# Patient Record
Sex: FEMALE | Race: WHITE | NOT HISPANIC OR LATINO | Employment: UNEMPLOYED | URBAN - METROPOLITAN AREA
[De-identification: names, ages, dates, MRNs, and addresses within clinical notes are randomized per-mention and may not be internally consistent; named-entity substitution may affect disease eponyms.]

---

## 2021-12-21 ENCOUNTER — HISTORICAL (OUTPATIENT)
Dept: RADIOLOGY | Facility: HOSPITAL | Age: 66
End: 2021-12-21

## 2022-01-07 ENCOUNTER — HISTORICAL (OUTPATIENT)
Dept: RADIOLOGY | Facility: HOSPITAL | Age: 67
End: 2022-01-07

## 2022-01-07 LAB — CREAT SERPL-MCNC: 0.94 MG/DL (ref 0.55–1.02)

## 2022-02-22 ENCOUNTER — HISTORICAL (OUTPATIENT)
Dept: RADIOLOGY | Facility: HOSPITAL | Age: 67
End: 2022-02-22

## 2022-02-22 ENCOUNTER — HISTORICAL (OUTPATIENT)
Dept: ADMINISTRATIVE | Facility: HOSPITAL | Age: 67
End: 2022-02-22

## 2022-02-22 LAB — CREAT SERPL-MCNC: 1.01 MG/DL (ref 0.55–1.02)

## 2022-02-25 ENCOUNTER — HISTORICAL (OUTPATIENT)
Dept: ADMINISTRATIVE | Facility: HOSPITAL | Age: 67
End: 2022-02-25

## 2022-02-25 LAB
ABS NEUT (OLG): 4.34 (ref 2.1–9.2)
ALBUMIN SERPL-MCNC: 3.3 G/DL (ref 3.4–4.8)
ALBUMIN/GLOB SERPL: 1 {RATIO} (ref 1.1–2)
ALP SERPL-CCNC: 86 U/L (ref 40–150)
ALT SERPL-CCNC: 12 U/L (ref 0–55)
AST SERPL-CCNC: 17 U/L (ref 5–34)
BASOPHILS # BLD AUTO: 0 10*3/UL (ref 0–0.2)
BASOPHILS NFR BLD AUTO: 1 %
BILIRUB SERPL-MCNC: 0.7 MG/DL
BILIRUBIN DIRECT+TOT PNL SERPL-MCNC: 0.3 (ref 0–0.5)
BILIRUBIN DIRECT+TOT PNL SERPL-MCNC: 0.4 (ref 0–0.8)
BUN SERPL-MCNC: 11 MG/DL (ref 9.8–20.1)
CALCIUM SERPL-MCNC: 9.5 MG/DL (ref 8.7–10.5)
CHLORIDE SERPL-SCNC: 106 MMOL/L (ref 98–107)
CO2 SERPL-SCNC: 30 MMOL/L (ref 23–31)
CREAT SERPL-MCNC: 0.91 MG/DL (ref 0.55–1.02)
EOSINOPHIL # BLD AUTO: 0.2 10*3/UL (ref 0–0.9)
EOSINOPHIL NFR BLD AUTO: 3 %
ERYTHROCYTE [DISTWIDTH] IN BLOOD BY AUTOMATED COUNT: 12.5 % (ref 11.5–14.5)
FLAG2 (OHS): 70
FLAG3 (OHS): 80
FLAGS (OHS): 70
GLOBULIN SER-MCNC: 3.3 G/DL (ref 2.4–3.5)
GLUCOSE SERPL-MCNC: 74 MG/DL (ref 82–115)
HCT VFR BLD AUTO: 47.2 % (ref 35–46)
HEMOLYSIS INTERF INDEX SERPL-ACNC: 32
HGB BLD-MCNC: 15.8 G/DL (ref 12–16)
ICTERIC INTERF INDEX SERPL-ACNC: 0
IMM GRANULOCYTES # BLD AUTO: 0.02 10*3/UL
IMM GRANULOCYTES NFR BLD AUTO: 0 %
LIPEMIC INTERF INDEX SERPL-ACNC: 5
LOW EVENT # SUSPECT FLAG (OHS): 70
LYMPHOCYTES # BLD AUTO: 1.6 10*3/UL (ref 0.6–4.6)
LYMPHOCYTES NFR BLD AUTO: 24 %
MANUAL DIFF? (OHS): NO
MCH RBC QN AUTO: 32.8 PG (ref 26–34)
MCHC RBC AUTO-ENTMCNC: 33.5 G/DL (ref 31–37)
MCV RBC AUTO: 97.9 FL (ref 80–100)
MO BLASTS SUSPECT FLAG (OHS): 40
MONOCYTES # BLD AUTO: 0.5 10*3/UL (ref 0.1–1.3)
MONOCYTES NFR BLD AUTO: 8 %
NEUTROPHILS # BLD AUTO: 4.34 10*3/UL (ref 2.1–9.2)
NEUTROPHILS NFR BLD AUTO: 65 %
NRBC BLD AUTO-RTO: 0 % (ref 0–0.2)
PLATELET # BLD AUTO: 278 10*3/UL (ref 130–400)
PMV BLD AUTO: 9.7 FL (ref 7.4–10.4)
POTASSIUM SERPL-SCNC: 5 MMOL/L (ref 3.5–5.1)
PROT SERPL-MCNC: 6.6 G/DL (ref 5.8–7.6)
RBC # BLD AUTO: 4.82 10*6/UL (ref 4–5.2)
SODIUM SERPL-SCNC: 142 MMOL/L (ref 136–145)
WBC # SPEC AUTO: 6.7 10*3/UL (ref 4.5–11)

## 2022-03-02 ENCOUNTER — HISTORICAL (OUTPATIENT)
Dept: SURGERY | Facility: HOSPITAL | Age: 67
End: 2022-03-02

## 2022-03-11 ENCOUNTER — HISTORICAL (OUTPATIENT)
Dept: RADIOLOGY | Facility: HOSPITAL | Age: 67
End: 2022-03-11

## 2022-04-07 DIAGNOSIS — Z17.1 MALIGNANT NEOPLASM OF BREAST IN FEMALE, ESTROGEN RECEPTOR NEGATIVE, UNSPECIFIED LATERALITY, UNSPECIFIED SITE OF BREAST: ICD-10-CM

## 2022-04-07 DIAGNOSIS — C50.919 MALIGNANT NEOPLASM OF BREAST IN FEMALE, ESTROGEN RECEPTOR NEGATIVE, UNSPECIFIED LATERALITY, UNSPECIFIED SITE OF BREAST: ICD-10-CM

## 2022-04-13 ENCOUNTER — HISTORICAL (OUTPATIENT)
Dept: INFUSION THERAPY | Facility: HOSPITAL | Age: 67
End: 2022-04-13
Payer: MEDICAID

## 2022-04-13 LAB
ABS NEUT (OLG): 5.36 (ref 2.1–9.2)
ALBUMIN SERPL-MCNC: 3.1 G/DL (ref 3.4–4.8)
ALBUMIN/GLOB SERPL: 1 {RATIO} (ref 1.1–2)
ALP SERPL-CCNC: 86 U/L (ref 40–150)
ALT SERPL-CCNC: 13 U/L (ref 0–55)
AST SERPL-CCNC: 19 U/L (ref 5–34)
BASOPHILS # BLD AUTO: 0 10*3/UL (ref 0–0.2)
BASOPHILS NFR BLD AUTO: 1 %
BILIRUB SERPL-MCNC: 0.6 MG/DL
BILIRUBIN DIRECT+TOT PNL SERPL-MCNC: 0.2 (ref 0–0.5)
BILIRUBIN DIRECT+TOT PNL SERPL-MCNC: 0.4 (ref 0–0.8)
BUN SERPL-MCNC: 12 MG/DL (ref 9.8–20.1)
CALCIUM SERPL-MCNC: 9.6 MG/DL (ref 8.7–10.5)
CHLORIDE SERPL-SCNC: 106 MMOL/L (ref 98–107)
CO2 SERPL-SCNC: 27 MMOL/L (ref 23–31)
CREAT SERPL-MCNC: 1.05 MG/DL (ref 0.55–1.02)
EOSINOPHIL # BLD AUTO: 0.2 10*3/UL (ref 0–0.9)
EOSINOPHIL NFR BLD AUTO: 3 %
ERYTHROCYTE [DISTWIDTH] IN BLOOD BY AUTOMATED COUNT: 12.6 % (ref 11.5–14.5)
FLAG2 (OHS): 70
FLAG3 (OHS): 80
FLAGS (OHS): 70
GLOBULIN SER-MCNC: 3 G/DL (ref 2.4–3.5)
GLUCOSE SERPL-MCNC: 133 MG/DL (ref 82–115)
HCT VFR BLD AUTO: 46.3 % (ref 35–46)
HEMOLYSIS INTERF INDEX SERPL-ACNC: 8
HGB BLD-MCNC: 15.4 G/DL (ref 12–16)
ICTERIC INTERF INDEX SERPL-ACNC: 0
IMM GRANULOCYTES # BLD AUTO: 0.02 10*3/UL
IMM GRANULOCYTES NFR BLD AUTO: 0 %
LIPEMIC INTERF INDEX SERPL-ACNC: 9
LOW EVENT # SUSPECT FLAG (OHS): 70
LYMPHOCYTES # BLD AUTO: 1.3 10*3/UL (ref 0.6–4.6)
LYMPHOCYTES NFR BLD AUTO: 18 %
MANUAL DIFF? (OHS): NO
MCH RBC QN AUTO: 32.2 PG (ref 26–34)
MCHC RBC AUTO-ENTMCNC: 33.3 G/DL (ref 31–37)
MCV RBC AUTO: 96.9 FL (ref 80–100)
MO BLASTS SUSPECT FLAG (OHS): 40
MONOCYTES # BLD AUTO: 0.5 10*3/UL (ref 0.1–1.3)
MONOCYTES NFR BLD AUTO: 6 %
NEUTROPHILS # BLD AUTO: 5.36 10*3/UL (ref 2.1–9.2)
NEUTROPHILS NFR BLD AUTO: 72 %
NRBC BLD AUTO-RTO: 0 % (ref 0–0.2)
PLATELET # BLD AUTO: 233 10*3/UL (ref 130–400)
PMV BLD AUTO: 9.4 FL (ref 7.4–10.4)
POTASSIUM SERPL-SCNC: 4.9 MMOL/L (ref 3.5–5.1)
PROT SERPL-MCNC: 6.1 G/DL (ref 5.8–7.6)
RBC # BLD AUTO: 4.78 10*6/UL (ref 4–5.2)
SODIUM SERPL-SCNC: 139 MMOL/L (ref 136–145)
WBC # SPEC AUTO: 7.4 10*3/UL (ref 4.5–11)

## 2022-04-26 ENCOUNTER — HISTORICAL (OUTPATIENT)
Dept: ADMINISTRATIVE | Facility: HOSPITAL | Age: 67
End: 2022-04-26
Payer: MEDICAID

## 2022-04-26 LAB
ABS NEUT (OLG): 0.03 (ref 2.1–9.2)
ALBUMIN SERPL-MCNC: 3.3 G/DL (ref 3.4–4.8)
ALBUMIN/GLOB SERPL: 1.1 {RATIO} (ref 1.1–2)
ALP SERPL-CCNC: 89 U/L (ref 40–150)
ALT SERPL-CCNC: 9 U/L (ref 0–55)
AST SERPL-CCNC: 10 U/L (ref 5–34)
BASOPHILS # BLD AUTO: 0 10*3/UL (ref 0–0.2)
BASOPHILS NFR BLD AUTO: 3 %
BILIRUB SERPL-MCNC: 0.4 MG/DL
BILIRUBIN DIRECT+TOT PNL SERPL-MCNC: 0.2 (ref 0–0.5)
BILIRUBIN DIRECT+TOT PNL SERPL-MCNC: 0.2 (ref 0–0.8)
BUN SERPL-MCNC: 10.4 MG/DL (ref 9.8–20.1)
CALCIUM SERPL-MCNC: 9.7 MG/DL (ref 8.7–10.5)
CHLORIDE SERPL-SCNC: 106 MMOL/L (ref 98–107)
CO2 SERPL-SCNC: 29 MMOL/L (ref 23–31)
CREAT SERPL-MCNC: 0.88 MG/DL (ref 0.55–1.02)
EOSINOPHIL # BLD AUTO: 0 10*3/UL (ref 0–0.9)
EOSINOPHIL NFR BLD AUTO: 3 %
ERYTHROCYTE [DISTWIDTH] IN BLOOD BY AUTOMATED COUNT: 11.4 % (ref 11.5–14.5)
FLAG2 (OHS): 80
FLAG3 (OHS): 80
FLAGS (OHS): 70
GLOBULIN SER-MCNC: 3.1 G/DL (ref 2.4–3.5)
GLUCOSE SERPL-MCNC: 143 MG/DL (ref 82–115)
HCT VFR BLD AUTO: 39.1 % (ref 35–46)
HEMOLYSIS INTERF INDEX SERPL-ACNC: 3
HGB BLD-MCNC: 13.1 G/DL (ref 12–16)
ICTERIC INTERF INDEX SERPL-ACNC: 0
IMM GRANULOCYTES # BLD AUTO: 0.01 10*3/UL
IMM GRANULOCYTES NFR BLD AUTO: 1 %
IMM. NE 2 SUSPECT FLAG (OHS): 160
LIPEMIC INTERF INDEX SERPL-ACNC: 1
LOW EVENT # SUSPECT FLAG (OHS): 70
LYMPHOCYTES # BLD AUTO: 0.6 10*3/UL (ref 0.6–4.6)
LYMPHOCYTES NFR BLD AUTO: 68 %
MANUAL DIFF? (OHS): NO
MCH RBC QN AUTO: 32 PG (ref 26–34)
MCHC RBC AUTO-ENTMCNC: 33.5 G/DL (ref 31–37)
MCV RBC AUTO: 95.4 FL (ref 80–100)
MO BLASTS SUSPECT FLAG (OHS): 160
MONOCYTES # BLD AUTO: 0.2 10*3/UL (ref 0.1–1.3)
MONOCYTES NFR BLD AUTO: 22 %
NEUTROPHILS # BLD AUTO: 0.03 10*3/UL (ref 2.1–9.2)
NEUTROPHILS NFR BLD AUTO: 4 %
NRBC BLD AUTO-RTO: 0 % (ref 0–0.2)
PLATELET # BLD AUTO: 187 10*3/UL (ref 130–400)
PLATELET CLUMPS SUSPECT FLAG (OHS): 10
PMV BLD AUTO: 9.3 FL (ref 7.4–10.4)
POTASSIUM SERPL-SCNC: 4.3 MMOL/L (ref 3.5–5.1)
PROT SERPL-MCNC: 6.4 G/DL (ref 5.8–7.6)
RBC # BLD AUTO: 4.1 10*6/UL (ref 4–5.2)
SODIUM SERPL-SCNC: 140 MMOL/L (ref 136–145)
WBC # SPEC AUTO: 0.9 10*3/UL (ref 4.5–11)

## 2022-04-27 ENCOUNTER — HISTORICAL (OUTPATIENT)
Dept: INFUSION THERAPY | Facility: HOSPITAL | Age: 67
End: 2022-04-27
Payer: MEDICAID

## 2022-04-28 ENCOUNTER — HISTORICAL (OUTPATIENT)
Dept: INFUSION THERAPY | Facility: HOSPITAL | Age: 67
End: 2022-04-28
Payer: MEDICAID

## 2022-04-29 ENCOUNTER — HISTORICAL (OUTPATIENT)
Dept: INFUSION THERAPY | Facility: HOSPITAL | Age: 67
End: 2022-04-29
Payer: MEDICAID

## 2022-04-29 LAB
ABS NEUT (OLG): 10 (ref 2.1–9.2)
ALBUMIN SERPL-MCNC: 3.2 G/DL (ref 3.4–4.8)
ALBUMIN/GLOB SERPL: 0.9 {RATIO} (ref 1.1–2)
ALP SERPL-CCNC: 97 U/L (ref 40–150)
ALT SERPL-CCNC: 8 U/L (ref 0–55)
AST SERPL-CCNC: 11 U/L (ref 5–34)
BILIRUB SERPL-MCNC: 0.5 MG/DL
BILIRUBIN DIRECT+TOT PNL SERPL-MCNC: 0.2 (ref 0–0.8)
BILIRUBIN DIRECT+TOT PNL SERPL-MCNC: 0.3 (ref 0–0.5)
BUN SERPL-MCNC: 9.7 MG/DL (ref 9.8–20.1)
BUN SERPL-MCNC: 9.9 MG/DL (ref 9.8–20.1)
CALCIUM SERPL-MCNC: 9.8 MG/DL (ref 8.7–10.5)
CALCIUM SERPL-MCNC: 9.8 MG/DL (ref 8.7–10.5)
CHLORIDE SERPL-SCNC: 102 MMOL/L (ref 98–107)
CHLORIDE SERPL-SCNC: 102 MMOL/L (ref 98–107)
CO2 SERPL-SCNC: 24 MMOL/L (ref 23–31)
CO2 SERPL-SCNC: 25 MMOL/L (ref 23–31)
CREAT SERPL-MCNC: 1.14 MG/DL (ref 0.55–1.02)
CREAT SERPL-MCNC: 1.16 MG/DL (ref 0.55–1.02)
CREAT/UREA NIT SERPL: 9
ERYTHROCYTE [DISTWIDTH] IN BLOOD BY AUTOMATED COUNT: 12.1 % (ref 11.5–14.5)
FLAG2 (OHS): 70
FLAG3 (OHS): 90
FLAGS (OHS): 70
GLOBULIN SER-MCNC: 3.6 G/DL (ref 2.4–3.5)
GLUCOSE SERPL-MCNC: 204 MG/DL (ref 82–115)
GLUCOSE SERPL-MCNC: 207 MG/DL (ref 82–115)
HCT VFR BLD AUTO: 42.3 % (ref 35–46)
HEMOLYSIS INTERF INDEX SERPL-ACNC: 4
HEMOLYSIS INTERF INDEX SERPL-ACNC: 9
HGB BLD-MCNC: 14.4 G/DL (ref 12–16)
ICTERIC INTERF INDEX SERPL-ACNC: 0
ICTERIC INTERF INDEX SERPL-ACNC: 0
IMM. NE 1 SUSPECT FLAG (OHS): 300
LIPEMIC INTERF INDEX SERPL-ACNC: 1
LIPEMIC INTERF INDEX SERPL-ACNC: 3
LOW EVENT # SUSPECT FLAG (OHS): 70
MANUAL DIFF? (OHS): YES
MCH RBC QN AUTO: 32.3 PG (ref 26–34)
MCHC RBC AUTO-ENTMCNC: 34 G/DL (ref 31–37)
MCV RBC AUTO: 94.8 FL (ref 80–100)
NRBCS SUSPECT FLAG (OHS): 0
PLATELET # BLD AUTO: 235 10*3/UL (ref 130–400)
PLATELET CLUMPS SUSPECT FLAG (OHS): 40
PMV BLD AUTO: 9.3 FL (ref 7.4–10.4)
POTASSIUM SERPL-SCNC: 3.9 MMOL/L (ref 3.5–5.1)
POTASSIUM SERPL-SCNC: 4 MMOL/L (ref 3.5–5.1)
PROT SERPL-MCNC: 6.8 G/DL (ref 5.8–7.6)
RBC # BLD AUTO: 4.46 10*6/UL (ref 4–5.2)
SODIUM SERPL-SCNC: 137 MMOL/L (ref 136–145)
SODIUM SERPL-SCNC: 137 MMOL/L (ref 136–145)
WBC # SPEC AUTO: 12.6 10*3/UL (ref 4.5–11)

## 2022-05-03 DIAGNOSIS — E66.9 OBESITY, UNSPECIFIED CLASSIFICATION, UNSPECIFIED OBESITY TYPE, UNSPECIFIED WHETHER SERIOUS COMORBIDITY PRESENT: Primary | ICD-10-CM

## 2022-05-04 ENCOUNTER — OFFICE VISIT (OUTPATIENT)
Dept: HEMATOLOGY/ONCOLOGY | Facility: CLINIC | Age: 67
End: 2022-05-04
Payer: MEDICAID

## 2022-05-04 VITALS
OXYGEN SATURATION: 97 % | SYSTOLIC BLOOD PRESSURE: 134 MMHG | DIASTOLIC BLOOD PRESSURE: 84 MMHG | TEMPERATURE: 99 F | WEIGHT: 166.19 LBS | BODY MASS INDEX: 28.37 KG/M2 | HEIGHT: 64 IN | HEART RATE: 88 BPM

## 2022-05-04 DIAGNOSIS — C50.911 INVASIVE DUCTAL CARCINOMA OF BREAST, RIGHT: Primary | ICD-10-CM

## 2022-05-04 DIAGNOSIS — E66.9 OBESITY, UNSPECIFIED CLASSIFICATION, UNSPECIFIED OBESITY TYPE, UNSPECIFIED WHETHER SERIOUS COMORBIDITY PRESENT: ICD-10-CM

## 2022-05-04 LAB
ALBUMIN SERPL-MCNC: 3.3 GM/DL (ref 3.4–4.8)
ALBUMIN/GLOB SERPL: 0.9 RATIO (ref 1.1–2)
ALP SERPL-CCNC: 92 UNIT/L (ref 40–150)
ALT SERPL-CCNC: 12 UNIT/L (ref 0–55)
AST SERPL-CCNC: 12 UNIT/L (ref 5–34)
BASOPHILS # BLD AUTO: 0.08 X10(3)/MCL (ref 0–0.2)
BASOPHILS NFR BLD AUTO: 0.8 %
BILIRUBIN DIRECT+TOT PNL SERPL-MCNC: 0.2 MG/DL (ref 0–0.5)
BILIRUBIN DIRECT+TOT PNL SERPL-MCNC: 0.2 MG/DL (ref 0–0.8)
BILIRUBIN DIRECT+TOT PNL SERPL-MCNC: 0.4 MG/DL
BUN SERPL-MCNC: 11.4 MG/DL (ref 9.8–20.1)
CALCIUM SERPL-MCNC: 9.6 MG/DL (ref 8.4–10.2)
CHLORIDE SERPL-SCNC: 106 MMOL/L (ref 98–107)
CO2 SERPL-SCNC: 29 MMOL/L (ref 23–31)
CREAT SERPL-MCNC: 1.01 MG/DL (ref 0.55–1.02)
EOSINOPHIL # BLD AUTO: 0.01 X10(3)/MCL (ref 0–0.9)
EOSINOPHIL NFR BLD AUTO: 0.1 %
ERYTHROCYTE [DISTWIDTH] IN BLOOD BY AUTOMATED COUNT: 11.9 % (ref 11.5–17)
GLOBULIN SER-MCNC: 3.5 GM/DL (ref 2.4–3.5)
GLUCOSE SERPL-MCNC: 111 MG/DL (ref 82–115)
HCT VFR BLD AUTO: 41.5 % (ref 37–47)
HGB BLD-MCNC: 13.7 GM/DL (ref 12–16)
IMM GRANULOCYTES # BLD AUTO: 0.46 X10(3)/MCL (ref 0–0.02)
IMM GRANULOCYTES NFR BLD AUTO: 4.8 % (ref 0–0.43)
LYMPHOCYTES # BLD AUTO: 1.01 X10(3)/MCL (ref 0.6–4.6)
LYMPHOCYTES NFR BLD AUTO: 10.5 %
MCH RBC QN AUTO: 31.7 PG (ref 27–31)
MCHC RBC AUTO-ENTMCNC: 33 MG/DL (ref 33–36)
MCV RBC AUTO: 96.1 FL (ref 80–94)
MONOCYTES # BLD AUTO: 1.27 X10(3)/MCL (ref 0.1–1.3)
MONOCYTES NFR BLD AUTO: 13.2 %
NEUTROPHILS # BLD AUTO: 6.8 X10(3)/MCL (ref 2.1–9.2)
NEUTROPHILS NFR BLD AUTO: 70.6 %
NRBC BLD AUTO-RTO: 0 %
PLATELET # BLD AUTO: 230 X10(3)/MCL (ref 130–400)
PMV BLD AUTO: 9.6 FL (ref 9.4–12.4)
POTASSIUM SERPL-SCNC: 4.6 MMOL/L (ref 3.5–5.1)
PROT SERPL-MCNC: 6.8 GM/DL (ref 5.8–7.6)
RBC # BLD AUTO: 4.32 X10(6)/MCL (ref 4.2–5.4)
SODIUM SERPL-SCNC: 141 MMOL/L (ref 136–145)
WBC # SPEC AUTO: 9.6 X10(3)/MCL (ref 4.5–11.5)

## 2022-05-04 PROCEDURE — 1160F RVW MEDS BY RX/DR IN RCRD: CPT | Mod: CPTII,,, | Performed by: NURSE PRACTITIONER

## 2022-05-04 PROCEDURE — 1159F MED LIST DOCD IN RCRD: CPT | Mod: CPTII,,, | Performed by: NURSE PRACTITIONER

## 2022-05-04 PROCEDURE — 85025 COMPLETE CBC W/AUTO DIFF WBC: CPT | Performed by: INTERNAL MEDICINE

## 2022-05-04 PROCEDURE — 1159F PR MEDICATION LIST DOCUMENTED IN MEDICAL RECORD: ICD-10-PCS | Mod: CPTII,,, | Performed by: NURSE PRACTITIONER

## 2022-05-04 PROCEDURE — 3079F PR MOST RECENT DIASTOLIC BLOOD PRESSURE 80-89 MM HG: ICD-10-PCS | Mod: CPTII,,, | Performed by: NURSE PRACTITIONER

## 2022-05-04 PROCEDURE — 3075F PR MOST RECENT SYSTOLIC BLOOD PRESS GE 130-139MM HG: ICD-10-PCS | Mod: CPTII,,, | Performed by: NURSE PRACTITIONER

## 2022-05-04 PROCEDURE — 99213 OFFICE O/P EST LOW 20 MIN: CPT | Mod: PBBFAC | Performed by: NURSE PRACTITIONER

## 2022-05-04 PROCEDURE — 3008F PR BODY MASS INDEX (BMI) DOCUMENTED: ICD-10-PCS | Mod: CPTII,,, | Performed by: NURSE PRACTITIONER

## 2022-05-04 PROCEDURE — 1160F PR REVIEW ALL MEDS BY PRESCRIBER/CLIN PHARMACIST DOCUMENTED: ICD-10-PCS | Mod: CPTII,,, | Performed by: NURSE PRACTITIONER

## 2022-05-04 PROCEDURE — 3079F DIAST BP 80-89 MM HG: CPT | Mod: CPTII,,, | Performed by: NURSE PRACTITIONER

## 2022-05-04 PROCEDURE — 36415 COLL VENOUS BLD VENIPUNCTURE: CPT

## 2022-05-04 PROCEDURE — 99214 OFFICE O/P EST MOD 30 MIN: CPT | Mod: S$PBB,,, | Performed by: NURSE PRACTITIONER

## 2022-05-04 PROCEDURE — 99214 PR OFFICE/OUTPT VISIT, EST, LEVL IV, 30-39 MIN: ICD-10-PCS | Mod: S$PBB,,, | Performed by: NURSE PRACTITIONER

## 2022-05-04 PROCEDURE — 80053 COMPREHEN METABOLIC PANEL: CPT | Performed by: INTERNAL MEDICINE

## 2022-05-04 PROCEDURE — 3075F SYST BP GE 130 - 139MM HG: CPT | Mod: CPTII,,, | Performed by: NURSE PRACTITIONER

## 2022-05-04 PROCEDURE — 3008F BODY MASS INDEX DOCD: CPT | Mod: CPTII,,, | Performed by: NURSE PRACTITIONER

## 2022-05-04 RX ORDER — CIPROFLOXACIN 500 MG/1
TABLET ORAL
COMMUNITY
Start: 2022-04-26 | End: 2023-01-30 | Stop reason: CLARIF

## 2022-05-04 NOTE — PROGRESS NOTES
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 Refer to Dr. Hatfield's noted dated 2/10/22 for detailed history.    Current Treatment:  DDAC started 4/13/2022    Treatment History:  -03/02/2022: Mediport placed    Interval history 5/4/2022: Patient presents today for scheduled follow up for breast cancer. She was due to receive cycle 2 of DDAC last week but was held due to neutropenia. She returns today for lab redraw. Lab work reviewed with patient and is now stable to resume treatment. Ptaient to return for treatment tomorrow 5/5/2022. Patient verbalized understanding. Medications reviewed with patient, no refills needed. She reports feeling well no signs of fever, chills, N/V/D/C. No further questions needs or concerns voiced.       Review of Systems: Negative except as otherwise stated in HPI     Physical Exam     Vitals & Measurements   Vitals:    05/04/22 1309   BP: 134/84   Pulse: 88   Temp: 98.6 °F (37 °C)       General: no acute distress  Eye: Pupils are equal, round and reactive to light, Extraocular movements intact, normal conjunctiva  HENT: Normocephalic. Oropharynx exam deferred; mask in place due to coronavirus pandemic  Neck: Supple, nontender  Respiratory: Respirations are non-labored, Breath sounds CTA bilaterally, Symmetrical chest wall expansion  Cardiovascular: RRR, normal peripheral perfusion, No edema  Breast: Exam deferred  Gastrointestinal: Non tender, non distended; present bowel sounds  GYN: Exam deferred  Genitourinary: Exam deferred  Lymphatics: No palpable adenopathies appreciated  Musculoskeletal: Moves all extremities  Integumentary: Intact, warm, dry. No rashes or lesions to visible skin  Neurologic: Alert and oriented. No focal defects  Psychiatric: Cooperative.  Appropriate mood and affect       Assessment/Plan     IDC right breast, presumed triple negative:  -History of right lower extremity DVT and right breast mass 02/2020; she did not follow-up  -Presentation: 11/2021: Palpable mass  -01/07/2022: Bilateral diagnostic mammogram with contrast and limited ultrasound right breast  -01/07/2022: Biopsy  -3.1 cm mass on mammogram, overall grade 3, right axillary lymph node biopsy positive  -ER low positive (1%). CT negative (0%). HER-2 negative by FISH. Ki-67 high proliferation (50%)  >>>   For the purpose of neoadjuvant chemotherapy, since ER iis low positive, we will treat the patient as if triple negative  >>>   -cT2 cN1 MX, grade 3, presumed triple negative, AJCC anatomic stage IIB, clinical prognostic stage IIIB  -02/22/2022: DEXA scan: Normal BMD  -02/22/2022: CT C/A/P with contrast for staging: Right breast mass with right axillary lymphadenopathy; bilateral adrenal nodules, statistically representing adenomas  -02/22/2022: Whole-body nuclear medicine bone scan: No bone metastasis  -03/11/2022: TTE: LVEF 55-60%      PLAN  -Despite the fact that ER is low positive (1%), regardless, in due course, will need adjuvant endocrine therapy  -Since ER positive (low positive), HER-2 negative, at least 1 axillary lymph node positive, grade 3 disease, and Ki-67 score >20%, therefore, in due course, will benefit from 2 years of adjuvant abemaciclib in combination with endocrine therapy    -Since this tumor may behave more like triple negative breast cancer, therefore, we have ordered genetic testing and counseling     In this case, indications of neoadjuvant chemotherapy:  -Presumed TNBC; cT2, cN1  -In this situation, complete pathologic response to preoperative systemic therapy is associated with an extremely favorable disease-free and overall survival, particularly in situations in which our treatment is given preoperatively  -Tumor response assessment: Clinical exam and  diagnostic mammogram/ultrasound  >>>   -Proceed with neoadjuvant chemotherapy with DD AC x4 cycles, followed by neoadjuvant Taxol  -Assess response with physical examination and repeat diagnostic mammogram and ultrasound of right breast after 4 cycles of neoadjuvant DD AC    Okay to proceed with cycle 2 of DDAC tomorrow 5/5/2022  Follow up with NP in 2 weeks on 5/18/2022 with CBC,CMP, MG prior to cycle 3 of DDAC on 5/19/2022    Chemotherapy regimen:  1. Doxorubicin 60 mg/m² IV day 1;  2. Cyclophosphamide 600 mg/m² IV day 1;  Cycled every 14 days for 4 cycles; followed by,  3. Paclitaxel 175 mg/m² by 3 hour IV infusion on day 1, cycled every 14 days for 4 cycles; or, paclitaxel 80 mg/m² IV 1 hour infusion weekly for 12 weeks.  All dose-dense cycles are with myeloid growth factor support.    After neoadjuvant chemotherapy, BCS or mastectomy and surgical axillary staging.    -Adjuvant RT will depend upon lumpectomy versus mastectomy status, as well as surgical pathology  (in this case, since the right axillary lymph node is biopsy positive, therefore,  will need adjuvant radiotherapy whether she undergoes lumpectomy or mastectomy)    Adjuvant systemic therapy after preoperative systemic therapy:  1. Consider adjuvant bisphosphonate therapy for risk reduction of distant metastasis for 3-5 years in postmenopausal patients with high risk node-negative or node positive tumors  >> Will offer her zoledronic acid 4 mg IV every 6 months x3-5 years in the adjuvant setting;  2. Adjuvant endocrine therapy appropriate for ER positive postmenopausal patient;  3. Adjuvant olaparib if germline BRCA1/2 mutation positive and residual disease after preoperative therapy and a clinical stage, pathologic stage, estrogen receptor status, and tumor grade (CPS+EG) = 3 or >3 (tumor is ER positive, HER-2 negative)  (If patient qualifies for olaparib, then, olaparib can be used concurrently with endocrine therapy but only after adjuvant  radiotherapy)  4. This patient has at least 1 axillary lymph node positive; has grade 3 disease; Ki-67 score is 50%; therefore, 2 years of adjuvant abemaciclib can be considered in combination with endocrine therapy

## 2022-05-05 ENCOUNTER — INFUSION (OUTPATIENT)
Dept: INFUSION THERAPY | Facility: HOSPITAL | Age: 67
End: 2022-05-05
Attending: INTERNAL MEDICINE
Payer: MEDICAID

## 2022-05-05 VITALS
HEIGHT: 64 IN | RESPIRATION RATE: 20 BRPM | OXYGEN SATURATION: 99 % | SYSTOLIC BLOOD PRESSURE: 144 MMHG | TEMPERATURE: 98 F | WEIGHT: 164.69 LBS | BODY MASS INDEX: 28.12 KG/M2 | DIASTOLIC BLOOD PRESSURE: 82 MMHG | HEART RATE: 82 BPM

## 2022-05-05 DIAGNOSIS — C50.919 MALIGNANT NEOPLASM OF BREAST IN FEMALE, ESTROGEN RECEPTOR NEGATIVE, UNSPECIFIED LATERALITY, UNSPECIFIED SITE OF BREAST: Primary | ICD-10-CM

## 2022-05-05 DIAGNOSIS — Z17.1 MALIGNANT NEOPLASM OF BREAST IN FEMALE, ESTROGEN RECEPTOR NEGATIVE, UNSPECIFIED LATERALITY, UNSPECIFIED SITE OF BREAST: Primary | ICD-10-CM

## 2022-05-05 PROCEDURE — 96411 CHEMO IV PUSH ADDL DRUG: CPT

## 2022-05-05 PROCEDURE — 96375 TX/PRO/DX INJ NEW DRUG ADDON: CPT

## 2022-05-05 PROCEDURE — A4216 STERILE WATER/SALINE, 10 ML: HCPCS | Performed by: INTERNAL MEDICINE

## 2022-05-05 PROCEDURE — 96413 CHEMO IV INFUSION 1 HR: CPT

## 2022-05-05 PROCEDURE — 25000003 PHARM REV CODE 250: Performed by: INTERNAL MEDICINE

## 2022-05-05 PROCEDURE — 63600175 PHARM REV CODE 636 W HCPCS: Performed by: INTERNAL MEDICINE

## 2022-05-05 RX ORDER — HEPARIN 100 UNIT/ML
100 SYRINGE INTRAVENOUS
Status: DISCONTINUED | OUTPATIENT
Start: 2022-05-05 | End: 2022-05-05 | Stop reason: HOSPADM

## 2022-05-05 RX ORDER — DOXORUBICIN HYDROCHLORIDE 2 MG/ML
60 INJECTION, SOLUTION INTRAVENOUS
Status: COMPLETED | OUTPATIENT
Start: 2022-05-05 | End: 2022-05-05

## 2022-05-05 RX ORDER — SODIUM CHLORIDE 0.9 % (FLUSH) 0.9 %
10 SYRINGE (ML) INJECTION
Status: DISCONTINUED | OUTPATIENT
Start: 2022-05-05 | End: 2022-05-05 | Stop reason: HOSPADM

## 2022-05-05 RX ORDER — HEPARIN 100 UNIT/ML
SYRINGE INTRAVENOUS
Status: DISCONTINUED
Start: 2022-05-05 | End: 2022-05-05 | Stop reason: HOSPADM

## 2022-05-05 RX ORDER — DIPHENHYDRAMINE HYDROCHLORIDE 50 MG/ML
25 INJECTION INTRAMUSCULAR; INTRAVENOUS
Status: COMPLETED | OUTPATIENT
Start: 2022-05-05 | End: 2022-05-05

## 2022-05-05 RX ADMIN — DEXAMETHASONE SODIUM PHOSPHATE 0.25 MG: 4 INJECTION, SOLUTION INTRA-ARTICULAR; INTRALESIONAL; INTRAMUSCULAR; INTRAVENOUS; SOFT TISSUE at 09:05

## 2022-05-05 RX ADMIN — DOXORUBICIN HYDROCHLORIDE 112 MG: 2 INJECTION, SOLUTION INTRAVENOUS at 09:05

## 2022-05-05 RX ADMIN — APREPITANT 130 MG: 130 INJECTION, EMULSION INTRAVENOUS at 09:05

## 2022-05-05 RX ADMIN — CYCLOPHOSPHAMIDE 1100 MG: 1 INJECTION INTRAVENOUS at 09:05

## 2022-05-05 RX ADMIN — SODIUM CHLORIDE: 9 INJECTION, SOLUTION INTRAVENOUS at 08:05

## 2022-05-05 RX ADMIN — SODIUM CHLORIDE, PRESERVATIVE FREE 10 ML: 5 INJECTION INTRAVENOUS at 11:05

## 2022-05-05 RX ADMIN — DIPHENHYDRAMINE HYDROCHLORIDE 25 MG: 50 INJECTION INTRAMUSCULAR; INTRAVENOUS at 09:05

## 2022-05-05 RX ADMIN — HEPARIN 100 UNITS: 100 SYRINGE at 11:05

## 2022-05-05 NOTE — NURSING
Pt here for A/C C 2 and will get fulphilia injection tomorrow in clinic. No complaints prior to starting this cycle.

## 2022-05-06 ENCOUNTER — INFUSION (OUTPATIENT)
Dept: INFUSION THERAPY | Facility: HOSPITAL | Age: 67
End: 2022-05-06
Attending: INTERNAL MEDICINE
Payer: MEDICAID

## 2022-05-06 VITALS
HEIGHT: 64 IN | DIASTOLIC BLOOD PRESSURE: 67 MMHG | RESPIRATION RATE: 18 BRPM | HEART RATE: 67 BPM | SYSTOLIC BLOOD PRESSURE: 137 MMHG | TEMPERATURE: 98 F | BODY MASS INDEX: 28.24 KG/M2 | WEIGHT: 165.38 LBS

## 2022-05-06 DIAGNOSIS — Z17.1 MALIGNANT NEOPLASM OF BREAST IN FEMALE, ESTROGEN RECEPTOR NEGATIVE, UNSPECIFIED LATERALITY, UNSPECIFIED SITE OF BREAST: Primary | ICD-10-CM

## 2022-05-06 DIAGNOSIS — C50.919 MALIGNANT NEOPLASM OF BREAST IN FEMALE, ESTROGEN RECEPTOR NEGATIVE, UNSPECIFIED LATERALITY, UNSPECIFIED SITE OF BREAST: Primary | ICD-10-CM

## 2022-05-06 PROCEDURE — 63600175 PHARM REV CODE 636 W HCPCS: Mod: TB | Performed by: INTERNAL MEDICINE

## 2022-05-06 RX ADMIN — PEGFILGRASTIM 6 MG: 6 INJECTION SUBCUTANEOUS at 02:05

## 2022-05-14 NOTE — OP NOTE
Indication for Surgery  long-term access for chemotherapy  Preoperative Diagnosis  invasive ductal carcinoma of right breast  Postoperative Diagnosis  invasive ductal carcinoma of right breast  Operation  Mediport insertion  Surgeon(s)  Vero Ramos MD Attending  Deborah Pizarro MD PGY-1  Assistant  Kenny Castillo MD PGY-5  Anesthesia  LMA  Estimated Blood Loss  5 mL  Findings  patent left subclavian vein  Specimen(s)  none  Complications  none  Technique  After informed consent was obtained, patient was taken to the operating room. The patient was positioned supine?and?prepped and draped in sterile fashion?after?induction of anesthesia.?The left subclavian vein was accessed?with a needle?and a wire was easily?threaded through the needle.?Placement was verified with fluoroscopy.?An 11 blade scalpel was then?used to create a small incision of the skin.?A dilator was inserted?over the wire, ensuring the wire remained mobile during advancement of the dilator.?At this time,?attention was turned to creating?a subcutaneous pocket for the Mediport.?Lidocaine with epinephrine?was injected inferior to the clavicle?and a 5 cm incision was made with a 15 blade scalpel.?Blunt dissection was used through the subcutaneous fat?to create a pocket.?Turning back to the venous access site, the catheter was then advanced over the wire?and placement?in the SVC was verified with fluoroscopy.?Catheter was securely attached to the port?and the port was secured to?the deep?subcutaneous tissue with a?3-0 Vicryl ensuring it was laying flat?against the chest wall.?Mediport function was confirmed?with aspiration?from the port?and then flushed with?500 mL of heparin.?The incision was then closed with?deep dermal?3-0 vicryl?interrupted sutures and?running subcuticular?4-0 Monocryl?and dressed with Dermabond.?Patient tolerated the procedure well?with no obvious complications. Dr. Ramos?was present for the entirety of the procedure.  ?  I  agree with resident documentation. I was physically present, supervised resident, ?and discussed plan of care.  Vero Ramos MD

## 2022-05-14 NOTE — H&P
Interval H&P Update  ?   Patient denies any major changes to health since?last seen in clinic.  Reports only line in the past was R CVC for her cholecystectomy.  Denies fevers, chills, nausea, vomiting, constipation, diarrhea, issues with voiding.  Reports no home medications.  Denies allergies to medications or issues with anesthesia.  Has appointment with oncology on 3/3.  ?   PE:  NAD  Unlabored breathing on room air  Palpable radial pulses bilaterally  Abd soft, nt, nd  No gross deformities  A&Ox3  ?  66F w invasive breast ductal carcinoma of R breast presenting for evaluation for mediport placement.  OR today for mediport placement. Consents obtained in clinic  ?   Deborah Pizarro MD PGY-1  ?   I agree with resident documentation. I was physically present, supervised resident, ?and discussed plan of care.  Vero Ramos MD  to OR for mediport placement

## 2022-05-18 ENCOUNTER — OFFICE VISIT (OUTPATIENT)
Dept: HEMATOLOGY/ONCOLOGY | Facility: CLINIC | Age: 67
End: 2022-05-18
Payer: MEDICAID

## 2022-05-18 VITALS
TEMPERATURE: 98 F | OXYGEN SATURATION: 98 % | HEIGHT: 64 IN | HEART RATE: 78 BPM | SYSTOLIC BLOOD PRESSURE: 132 MMHG | DIASTOLIC BLOOD PRESSURE: 71 MMHG | BODY MASS INDEX: 27.43 KG/M2 | WEIGHT: 160.69 LBS

## 2022-05-18 DIAGNOSIS — Z17.1 MALIGNANT NEOPLASM OF BREAST IN FEMALE, ESTROGEN RECEPTOR NEGATIVE, UNSPECIFIED LATERALITY, UNSPECIFIED SITE OF BREAST: Primary | ICD-10-CM

## 2022-05-18 DIAGNOSIS — C50.911 INVASIVE DUCTAL CARCINOMA OF BREAST, FEMALE, RIGHT: ICD-10-CM

## 2022-05-18 DIAGNOSIS — C50.919 MALIGNANT NEOPLASM OF BREAST IN FEMALE, ESTROGEN RECEPTOR NEGATIVE, UNSPECIFIED LATERALITY, UNSPECIFIED SITE OF BREAST: Primary | ICD-10-CM

## 2022-05-18 PROCEDURE — 99214 PR OFFICE/OUTPT VISIT, EST, LEVL IV, 30-39 MIN: ICD-10-PCS | Mod: S$PBB,,, | Performed by: NURSE PRACTITIONER

## 2022-05-18 PROCEDURE — 3008F BODY MASS INDEX DOCD: CPT | Mod: CPTII,,, | Performed by: NURSE PRACTITIONER

## 2022-05-18 PROCEDURE — 3078F DIAST BP <80 MM HG: CPT | Mod: CPTII,,, | Performed by: NURSE PRACTITIONER

## 2022-05-18 PROCEDURE — 3075F SYST BP GE 130 - 139MM HG: CPT | Mod: CPTII,,, | Performed by: NURSE PRACTITIONER

## 2022-05-18 PROCEDURE — 99214 OFFICE O/P EST MOD 30 MIN: CPT | Mod: PBBFAC | Performed by: NURSE PRACTITIONER

## 2022-05-18 PROCEDURE — 80053 COMPREHEN METABOLIC PANEL: CPT | Performed by: NURSE PRACTITIONER

## 2022-05-18 PROCEDURE — 3078F PR MOST RECENT DIASTOLIC BLOOD PRESSURE < 80 MM HG: ICD-10-PCS | Mod: CPTII,,, | Performed by: NURSE PRACTITIONER

## 2022-05-18 PROCEDURE — 1160F PR REVIEW ALL MEDS BY PRESCRIBER/CLIN PHARMACIST DOCUMENTED: ICD-10-PCS | Mod: CPTII,,, | Performed by: NURSE PRACTITIONER

## 2022-05-18 PROCEDURE — 1159F MED LIST DOCD IN RCRD: CPT | Mod: CPTII,,, | Performed by: NURSE PRACTITIONER

## 2022-05-18 PROCEDURE — 85025 COMPLETE CBC W/AUTO DIFF WBC: CPT | Performed by: NURSE PRACTITIONER

## 2022-05-18 PROCEDURE — 3075F PR MOST RECENT SYSTOLIC BLOOD PRESS GE 130-139MM HG: ICD-10-PCS | Mod: CPTII,,, | Performed by: NURSE PRACTITIONER

## 2022-05-18 PROCEDURE — 1159F PR MEDICATION LIST DOCUMENTED IN MEDICAL RECORD: ICD-10-PCS | Mod: CPTII,,, | Performed by: NURSE PRACTITIONER

## 2022-05-18 PROCEDURE — 1160F RVW MEDS BY RX/DR IN RCRD: CPT | Mod: CPTII,,, | Performed by: NURSE PRACTITIONER

## 2022-05-18 PROCEDURE — 99214 OFFICE O/P EST MOD 30 MIN: CPT | Mod: S$PBB,,, | Performed by: NURSE PRACTITIONER

## 2022-05-18 PROCEDURE — 3008F PR BODY MASS INDEX (BMI) DOCUMENTED: ICD-10-PCS | Mod: CPTII,,, | Performed by: NURSE PRACTITIONER

## 2022-05-18 RX ORDER — DOXORUBICIN HYDROCHLORIDE 2 MG/ML
60 INJECTION, SOLUTION INTRAVENOUS
Status: CANCELLED | OUTPATIENT
Start: 2022-05-19

## 2022-05-18 RX ORDER — HEPARIN 100 UNIT/ML
500 SYRINGE INTRAVENOUS
Status: CANCELLED | OUTPATIENT
Start: 2022-05-19

## 2022-05-18 RX ORDER — SODIUM CHLORIDE 0.9 % (FLUSH) 0.9 %
10 SYRINGE (ML) INJECTION
Status: CANCELLED | OUTPATIENT
Start: 2022-05-19

## 2022-05-18 RX ORDER — DIPHENHYDRAMINE HYDROCHLORIDE 50 MG/ML
25 INJECTION INTRAMUSCULAR; INTRAVENOUS
Status: CANCELLED
Start: 2022-05-19

## 2022-05-18 NOTE — PROGRESS NOTES
Problem List:  Invasive ductal Carcinoma     Current Treatment:  1. Doxorubicin 60 mg/m² IV day 1;  2. Cyclophosphamide 600 mg/m² IV day 1;  Cycled every 14 days for 4 cycles; followed by,  3. Paclitaxel 175 mg/m² by 3 hour IV infusion on day 1, cycled every 14 days for 4 cycles; or, paclitaxel 80 mg/m² IV 1 hour infusion weekly for 12 weeks.  All dose-dense cycles are with myeloid growth factor support.    Start 4/13/2022    Treatment History:      Past medical history: Obesity. Tobacco abuse.  -02/19/2020: Acute nonocclusive DVT in the right distal femoral vein; acute occlusive DVTs right popliteal and right peroneal veins ((she never followed up on that)  Procedure/surgical history: Cholecystectomy (2001). Bilateral tubal ligation (according to her).  Social history: . Lives in [place], [place]. Has 2 children. Children. Does not work. Smoked half a pack of cigarettes daily for 40 years; quit 2 weeks ago. No alcohol or illicit drugs.  Family history: No family history of cancer.  Health maintenance: According to her, screening colonoscopy 5 years ago at [facility], probably revealing a benign polyp.  Menstrual and OB/GYN history: Menarche, age 13. Menopause, age 59. G2, P2. No abortions or miscarriages. Age at delivery of first child, 22. Did not breast-feed her children. Used birth control pills for 1 year, subsequently, bilateral tubal ligation. No history of hormone replacement therapy.        History of Present Illness:  66-year-old lady referred from surgery, with breast cancer.    She initially presented to emergency department 11/15/2021 for evaluation of breast pain and a palpable lump in the right breast. Subsequent imaging studies and biopsy established diagnosis of invasive ductal carcinoma of right breast, axillary lymph node positive, ER low positive, NV negative, and HER-2 negative.    Physical examination by surgery on 01/27/2022 makes note of 3 cm x 3 cm right breast periareolar mass with  overlying erythema and nipple retraction; no nipple discharge. Left breast without masses, skin changes, dimpling, nipple retraction or discharge. Right axilla with biopsy scar overlying small palpable mass.      ER low positive (1%). MT negative (0%). HER-2 equivocal (score 2+); HER-2 negative/not amplified (by FISH) Ki-67 high proliferation (50%).      Interval History 5/18/2022 : Patient presents today for scheduled follow up for Invasive Breast Carcinoma. She is due to receive DDAC cycle 3 tomorrow. She reports loss of appeite but as somewhat improved. She also says that affected breast has intermittent pain, she applies a cold pack and says that it helps relieve pain. Lab work reviewed with patient, stable for treatment tomorrow. Medications reviewed with patient, no medications needed.       Review of Systems: A complete 12-point ROS was performed in full with pertinent positives as described in interval history. Remainder of ROS done in full and are negative.    Physical Exam    Vitals & Measurements  Vitals:    05/18/22 0909   BP: 132/71   Pulse: 78   Temp: 97.9 °F (36.6 °C)         Physical Exam:  General: Alert and oriented, No acute distress.   Appearance: Well-groomed wearing mask  HEENT: Normocephalic, Oral mucosa is moist. Pupils are equal, round and reactive to light, Extraocular movements are intact, Normal conjunctiva.   Neck: Supple, Non-tender, No lymphadenopathy, No thyromegaly.   Respiratory: Lungs are clear to auscultation, Respirations are non-labored, Breath sounds are equal, Symmetrical chest wall expansion.   Cardiovascular: Normal rate, Regular rhythm, No edema.   Breast:Right breast inferior outer portion hardened, non-tender, no signs of inflammation/infection  Gastrointestinal: Rounded, Soft, Non-tender, Non-distended, Normal bowel sounds.   Musculoskeletal: Normal strength. Ambulates without assistance  Integumentary: Warm, dry, intact.   Neurologic: Alert, Oriented, No focal deficits,  Cranial Nerves II-XII are grossly intact.   Cognition and Speech: Oriented, Speech clear and coherent.   Psychiatric: Cooperative, Appropriate mood & affect.   ECOG Performance Scale: 0 - Capable of all self-care no restrictions    Assessment:  IDC right breast, presumed triple negative:  -History of right lower extremity DVT and right breast mass 02/2020; she did not follow-up  -Presentation: 11/2021: Palpable mass  -01/07/2022: Bilateral diagnostic mammogram with contrast and limited ultrasound right breast  -01/07/2022: Biopsy  -3.1 cm mass on mammogram, overall grade 3, right axillary lymph node biopsy positive  -ER low positive (1%). AZ negative (0%). HER-2 negative by FISH. Ki-67 high proliferation (50%)  >>>   For the purpose of neoadjuvant chemotherapy, since ER iis low positive, we will treat the patient as if triple negative  >>>   -cT2 cN1 MX, grade 3, presumed triple negative, AJCC anatomic stage IIB, clinical prognostic stage IIIB  -02/22/2022: DEXA scan: Normal BMD  -02/22/2022: CT C/A/P with contrast for staging: Right breast mass with right axillary lymphadenopathy; bilateral adrenal nodules, statistically representing adenomas  -02/22/2022: Whole-body nuclear medicine bone scan: No bone metastasis  -03/02/2022: Mediport placed  -03/11/2022: TTE: LVEF 55-60%      #History of right lower extremity DVT 02/19/2020:  -02/19/2020: (Right leg swelling for 3 days) acute nonocclusive DVT in the right distal femoral vein; acute occlusive DVTs right popliteal and right peroneal veins  -Apparently, was also noted to have a breast lump (right breast, 3:00, a small lump, nontender, slightly irregular on palpation, not hard or fluctuant, no erythema)  -Apparently, [facility] ambulatory clinics tried to contact her but without any response    Normal BMD on baseline DEXA scan.    Plan  -Despite the fact that ER is low positive (1%), regardless, in due course, will need adjuvant endocrine therapy  -Since ER positive (low  positive), HER-2 negative, at least 1 axillary lymph node positive, grade 3 disease, and Ki-67 score >20%, therefore, in due course, will benefit from 2 years of adjuvant abemaciclib in combination with endocrine therapy    -Since this tumor may behave more like triple negative breast cancer, therefore, we have ordered genetic testing and counseling     In this case, indications of neoadjuvant chemotherapy:  -Presumed TNBC; cT2, cN1  -In this situation, complete pathologic response to preoperative systemic therapy is associated with an extremely favorable disease-free and overall survival, particularly in situations in which our treatment is given preoperatively  -Tumor response assessment: Clinical exam and diagnostic mammogram/ultrasound  >>>   -Proceed with neoadjuvant chemotherapy with DD AC x4 cycles, followed by neoadjuvant Taxol  -Assess response with physical examination and repeat diagnostic mammogram and ultrasound of right breast after 4 cycles of neoadjuvant DD AC    Chemotherapy regimen:  1. Doxorubicin 60 mg/m² IV day 1;  2. Cyclophosphamide 600 mg/m² IV day 1;  Cycled every 14 days for 4 cycles; followed by,  3. Paclitaxel 175 mg/m² by 3 hour IV infusion on day 1, cycled every 14 days for 4 cycles; or, paclitaxel 80 mg/m² IV 1 hour infusion weekly for 12 weeks.  All dose-dense cycles are with myeloid growth factor support.    After neoadjuvant chemotherapy, BCS or mastectomy and surgical axillary staging.    -Adjuvant RT will depend upon lumpectomy versus mastectomy status, as well as surgical pathology  (in this case, since the right axillary lymph node is biopsy positive, therefore,  will need adjuvant radiotherapy whether she undergoes lumpectomy or mastectomy)    Adjuvant systemic therapy after preoperative systemic therapy:  1. Consider adjuvant bisphosphonate therapy for risk reduction of distant metastasis for 3-5 years in postmenopausal patients with high risk node-negative or node positive  tumors  >> Will offer her zoledronic acid 4 mg IV every 6 months x3-5 years in the adjuvant setting;  2. Adjuvant endocrine therapy appropriate for ER positive postmenopausal patient;  3. Adjuvant olaparib if germline BRCA1/2 mutation positive and residual disease after preoperative therapy and a clinical stage, pathologic stage, estrogen receptor status, and tumor grade (CPS+EG) = 3 or >3 (tumor is ER positive, HER-2 negative)  (If patient qualifies for olaparib, then, olaparib can be used concurrently with endocrine therapy but only after adjuvant radiotherapy)  4. This patient has at least 1 axillary lymph node positive; has grade 3 disease; Ki-67 score is 50%; therefore, 2 years of adjuvant abemaciclib can be considered in combination with endocrine therapy      Follow-up with NP in 2 weeks in 2 weeks with CBC,CMP,MG prior to cycle 4 DDAC    Above discussed at length with the patient. All questions answered.  Discussed labs  Discussed plan of management in detail.  She understands and agrees this plan.

## 2022-05-19 ENCOUNTER — INFUSION (OUTPATIENT)
Dept: INFUSION THERAPY | Facility: HOSPITAL | Age: 67
End: 2022-05-19
Attending: INTERNAL MEDICINE
Payer: MEDICAID

## 2022-05-19 VITALS
TEMPERATURE: 98 F | HEART RATE: 71 BPM | SYSTOLIC BLOOD PRESSURE: 132 MMHG | RESPIRATION RATE: 18 BRPM | DIASTOLIC BLOOD PRESSURE: 68 MMHG

## 2022-05-19 DIAGNOSIS — Z17.1 MALIGNANT NEOPLASM OF BREAST IN FEMALE, ESTROGEN RECEPTOR NEGATIVE, UNSPECIFIED LATERALITY, UNSPECIFIED SITE OF BREAST: Primary | ICD-10-CM

## 2022-05-19 DIAGNOSIS — C50.919 MALIGNANT NEOPLASM OF BREAST IN FEMALE, ESTROGEN RECEPTOR NEGATIVE, UNSPECIFIED LATERALITY, UNSPECIFIED SITE OF BREAST: Primary | ICD-10-CM

## 2022-05-19 PROCEDURE — 96415 CHEMO IV INFUSION ADDL HR: CPT

## 2022-05-19 PROCEDURE — 96413 CHEMO IV INFUSION 1 HR: CPT

## 2022-05-19 PROCEDURE — 96411 CHEMO IV PUSH ADDL DRUG: CPT

## 2022-05-19 PROCEDURE — 96375 TX/PRO/DX INJ NEW DRUG ADDON: CPT

## 2022-05-19 PROCEDURE — 63600175 PHARM REV CODE 636 W HCPCS: Performed by: NURSE PRACTITIONER

## 2022-05-19 PROCEDURE — 36593 DECLOT VASCULAR DEVICE: CPT

## 2022-05-19 PROCEDURE — 25000003 PHARM REV CODE 250: Performed by: NURSE PRACTITIONER

## 2022-05-19 RX ORDER — SODIUM CHLORIDE 0.9 % (FLUSH) 0.9 %
10 SYRINGE (ML) INJECTION
Status: DISCONTINUED | OUTPATIENT
Start: 2022-05-19 | End: 2022-05-19 | Stop reason: HOSPADM

## 2022-05-19 RX ORDER — HEPARIN SODIUM (PORCINE) LOCK FLUSH IV SOLN 100 UNIT/ML 100 UNIT/ML
100 SOLUTION INTRAVENOUS
Status: DISCONTINUED | OUTPATIENT
Start: 2022-05-19 | End: 2022-05-19 | Stop reason: HOSPADM

## 2022-05-19 RX ORDER — DOXORUBICIN HYDROCHLORIDE 2 MG/ML
60 INJECTION, SOLUTION INTRAVENOUS
Status: COMPLETED | OUTPATIENT
Start: 2022-05-19 | End: 2022-05-19

## 2022-05-19 RX ORDER — DIPHENHYDRAMINE HYDROCHLORIDE 50 MG/ML
25 INJECTION INTRAMUSCULAR; INTRAVENOUS
Status: COMPLETED | OUTPATIENT
Start: 2022-05-19 | End: 2022-05-19

## 2022-05-19 RX ADMIN — ALTEPLASE 2 MG: 2.2 INJECTION, POWDER, LYOPHILIZED, FOR SOLUTION INTRAVENOUS at 11:05

## 2022-05-19 RX ADMIN — CYCLOPHOSPHAMIDE 1120 MG: 1 INJECTION INTRAVENOUS at 01:05

## 2022-05-19 RX ADMIN — DEXAMETHASONE SODIUM PHOSPHATE 0.25 MG: 4 INJECTION, SOLUTION INTRA-ARTICULAR; INTRALESIONAL; INTRAMUSCULAR; INTRAVENOUS; SOFT TISSUE at 11:05

## 2022-05-19 RX ADMIN — APREPITANT 130 MG: 130 INJECTION, EMULSION INTRAVENOUS at 11:05

## 2022-05-19 RX ADMIN — DOXORUBICIN HYDROCHLORIDE 112 MG: 2 INJECTION, SOLUTION INTRAVENOUS at 12:05

## 2022-05-19 RX ADMIN — DIPHENHYDRAMINE HYDROCHLORIDE 25 MG: 50 INJECTION INTRAMUSCULAR; INTRAVENOUS at 11:05

## 2022-05-19 NOTE — NURSING
C3 DDAC  After premeds administered I was unable to obtain an adequate blood return, cathflo was instilled into the catheter

## 2022-05-20 ENCOUNTER — INFUSION (OUTPATIENT)
Dept: INFUSION THERAPY | Facility: HOSPITAL | Age: 67
End: 2022-05-20
Attending: INTERNAL MEDICINE
Payer: MEDICAID

## 2022-05-20 VITALS
BODY MASS INDEX: 28.04 KG/M2 | WEIGHT: 164.25 LBS | TEMPERATURE: 98 F | HEIGHT: 64 IN | RESPIRATION RATE: 18 BRPM | HEART RATE: 77 BPM | SYSTOLIC BLOOD PRESSURE: 127 MMHG | DIASTOLIC BLOOD PRESSURE: 64 MMHG

## 2022-05-20 DIAGNOSIS — C50.919 MALIGNANT NEOPLASM OF BREAST IN FEMALE, ESTROGEN RECEPTOR NEGATIVE, UNSPECIFIED LATERALITY, UNSPECIFIED SITE OF BREAST: Primary | ICD-10-CM

## 2022-05-20 DIAGNOSIS — Z17.1 MALIGNANT NEOPLASM OF BREAST IN FEMALE, ESTROGEN RECEPTOR NEGATIVE, UNSPECIFIED LATERALITY, UNSPECIFIED SITE OF BREAST: Primary | ICD-10-CM

## 2022-05-20 PROCEDURE — 63600175 PHARM REV CODE 636 W HCPCS: Mod: TB | Performed by: NURSE PRACTITIONER

## 2022-05-20 PROCEDURE — 96372 THER/PROPH/DIAG INJ SC/IM: CPT

## 2022-05-20 RX ADMIN — PEGFILGRASTIM 6 MG: 6 INJECTION SUBCUTANEOUS at 02:05

## 2022-05-21 NOTE — HISTORICAL [facility]
This is a historical note converted from [facility]. Formatting and pictures may have been removed.  Please reference [facility]cy for original formatting and attached multimedia. Chief Complaint  breast cancer; follow up  History of Present Illness  66 year old female with IDC of right breast presumed triple negative, AJCC stage IIB, clinical prognostic stage IIIB, also history of R LE DVT who received her first cycle of DDAC on 4/13/22. Refer to Dr. Conklin noted dated 2/10/22 for detailed history.  ?  Interval history: Seen in follow up today, 4/26/22 unaccompanied; labs revealing severe neutropenia will therefore hold treatment. In terms of how shes feeling, no significant complaints or concerns. Feeling relatively well today; noticeable fatigue. No active s/s of infection. Some R breast pain today, not new and unchanged. Discussed with her ok to take?prescribed percocet if needed for pain.  ?  Review of Systems  Negative except as otherwise stated in HPI  Physical Exam  Vitals & Measurements  T:?36.8? ?C (Oral)? HR:?94(Peripheral)? RR:?18? BP:?123/74? SpO2:?96%?  HT:?163.00?cm? WT:?75.650?kg? BMI:?28.47?  General: NAD  Eye: Pupils are equal, round and reactive to light, Extraocular movements intact, normal conjunctiva  HENT: Normocephalic. Oropharynx exam deferred; mask in place due to coronavirus pandemic  Neck: Supple, nontender  Respiratory: Respirations are non-labored, Breath sounds CTA bilaterally, Symmetrical chest wall expansion  Cardiovascular: RRR, normal peripheral perfusion, No edema  Breast: Exam deferred  Gastrointestinal: Non tender, non distended; present bowel sounds  GYN: Exam deferred  Genitourinary: Exam deferred  Lymphatics: No palpable adenopathies appreciated  Musculoskeletal: Moves all extremities  Integumentary: Intact, warm, dry. No rashes or lesions to visible skin  Neurologic: Alert and oriented. No focal defects  Psychiatric: Cooperative. Appropriate mood and affect?  Assessment/Plan  IDC  right breast, presumed triple negative:  -History of right lower extremity DVT and right breast mass 02/2020; she did not follow-up  -Presentation: 11/2021: Palpable mass  -01/07/2022: Bilateral diagnostic mammogram with contrast and limited ultrasound right breast  -01/07/2022: Biopsy  -3.1 cm mass on mammogram, overall grade 3, right axillary lymph node biopsy positive  -ER low positive (1%).? OK negative (0%).? HER-2 negative by FISH.? Ki-67 high proliferation (50%)  >>>  For the purpose of neoadjuvant chemotherapy, since ER iis low positive, we will treat the patient as if triple negative  >>>  -cT2 cN1 MX, grade 3, presumed triple negative, AJCC anatomic stage IIB, clinical prognostic stage IIIB  -02/22/2022: DEXA scan: Normal BMD  -02/22/2022: CT C/A/P with contrast for staging: Right breast mass with right axillary lymphadenopathy; bilateral adrenal nodules, statistically representing adenomas  -02/22/2022: Whole-body nuclear medicine bone scan: No bone metastasis  -03/02/2022: Mediport placed  -03/11/2022: TTE: LVEF 55-60%  ?  ?   PLAN  -Despite the fact that ER is low positive (1%), regardless, in due course, will need adjuvant endocrine therapy  -Since ER positive (low positive), HER-2 negative, at least 1 axillary lymph node positive, grade 3 disease, and Ki-67 score >20%, therefore, in due course, will benefit from 2 years of adjuvant abemaciclib in combination with endocrine therapy  ?   -Since this tumor may behave more like triple negative breast cancer, therefore, we have ordered genetic testing?and counseling?  ?   In this case, indications of neoadjuvant chemotherapy:  -Presumed TNBC;?cT2, cN1  -In this situation, complete pathologic response to preoperative systemic therapy is associated with an extremely favorable disease-free and overall survival, particularly in situations in which our treatment is given preoperatively  -Tumor response assessment: Clinical exam and diagnostic  mammogram/ultrasound  >>>  -Proceed with neoadjuvant chemotherapy with DD AC?x4 cycles, followed by neoadjuvant Taxol  -Proceed with formal chemotherapy teaching with nursing staff ASAP  -Check CBC and CMP every couple of weeks before each cycle of DD AC  -Assess response with physical examination?and repeat diagnostic mammogram and ultrasound of right breast?after 4 cycles of neoadjuvant?DD AC  ?   Chemotherapy regimen:  1. Doxorubicin 60 mg/m? IV day 1;  2. Cyclophosphamide 600 mg/m? IV day 1;  Cycled every 14 days for 4 cycles; followed by,  3.? Paclitaxel 175 mg/m? by 3 hour IV infusion on day 1, cycled every 14 days for 4 cycles; or, paclitaxel 80 mg/m? IV 1 hour infusion weekly for 12 weeks.  All dose-dense cycles are with myeloid growth factor support.  ?   After neoadjuvant chemotherapy, BCS or mastectomy and surgical axillary staging.  ?   -Adjuvant RT will depend upon lumpectomy versus mastectomy status, as well as surgical pathology  (in this case, since the right axillary lymph node is biopsy positive, therefore,  will need adjuvant radiotherapy?whether she undergoes lumpectomy or mastectomy)  ?  Adjuvant systemic therapy after preoperative systemic therapy:  1.? Consider adjuvant bisphosphonate therapy for risk reduction of distant metastasis for 3-5 years in postmenopausal patients with high risk node-negative or node positive tumors  >> Will offer her zoledronic acid 4 mg IV every 6 months x3-5 years in the adjuvant setting;  2.? Adjuvant endocrine therapy appropriate for ER positive postmenopausal patient;  3.? Adjuvant olaparib if germline BRCA1/2 mutation positive and residual disease after preoperative therapy and a clinical stage, pathologic stage, estrogen receptor status, and tumor grade (CPS+EG) = 3 or >3?(tumor is ER positive, HER-2 negative)  (If patient qualifies for olaparib,?then, olaparib can be used concurrently with endocrine therapy?but only after adjuvant radiotherapy)  4.? This  patient has at least 1 axillary lymph node positive; has grade 3 disease; Ki-67 score is 50%; therefore, 2 years of adjuvant abemaciclib can be considered in combination with endocrine therapy  ?  Normal BMD on baseline DEXA scan  ?  - Received C1 DDAC on 4/13/22  - Today has severe neutropenia with ANC 0.03; will therefore hold C2 DDAC; give growth factor x 5 days (inability to start today due to medical plan requiring PA); start oral antibiotic of Ciprofloxacin 500 mg twice daily x 7d or until neutropenia resolves. Repeat CBC later this week; reassessment visit in clinic in 1 wk  - Educated on strict neutropenic precautions; infection risks; infection signs and symptoms; to contact the office in the interim should she develop fever or have other clinical questions or concerns for which she verbalized understanding  ?  History of right lower extremity DVT 02/19/2020:  -02/19/2020: (Right leg swelling for 3 days) acute nonocclusive DVT in the right distal femoral vein; acute occlusive DVTs right popliteal and right peroneal veins  -Apparently, was also noted to have a breast lump (right breast, 3:00, a small lump, nontender, slightly irregular on palpation, not hard or fluctuant, no erythema)  -Apparently, [facility] ambulatory clinics tried to contact her but without any response  ?  ?  ?   Problem List/Past Medical History  Ongoing  Breast CA  Obesity  Historical  No qualifying data  Procedure/Surgical History  Catheter Insertion Mediport (None) (03/02/2022)  Insertion of Totally Implantable Vascular Access Device into Chest Subcutaneous Tissue and Fascia, Open Approach (03/02/2022)  Insertion of tunneled centrally inserted central venous access device, with subcutaneous port; age 5 years or older (03/02/2022)  Biopsy or excision of lymph node(s); by needle, superficial (eg, cervical, inguinal, axillary) (01/01/2022)  Biopsy, breast, with placement of breast localization device(s) (eg, clip, metallic pellet), when  performed, and imaging of the biopsy specimen, when performed, percutaneous; each additional lesion, including ultrasound guidance (List separately in additi (01/01/2022)  Biopsy, breast, with placement of breast localization device(s) (eg, clip, metallic pellet), when performed, and imaging of the biopsy specimen, when performed, percutaneous; first lesion, including ultrasound guidance (01/01/2022)  Cholecystectomy   Medications  acetaminophen-oxycodone 325 mg-5 mg oral tablet, 1 tab(s), Oral, q4hr,? ?Not Taking per Prescriber  ciprofloxacin 500 mg oral tablet, 500 mg= 1 tab(s), Oral, q12hr  Allergies  No Known Allergies  Social History  Abuse/Neglect  No, No, Yes, 04/13/2022  Alcohol - Denies Alcohol Use, 10/15/2014  Never, 03/29/2022  Substance Use - Denies Substance Abuse, 10/15/2014  Never, 03/29/2022  Tobacco - Medium Risk, 10/15/2014  Former smoker, quit more than 30 days ago, No, 04/13/2022  Family History  Family history is negative  Health Maintenance  Health Maintenance  ???Pending?(in the next year)  ??? ??OverDue  ??? ? ? ?Smoking Cessation due??01/01/22??Variable frequency  ??? ? ? ?Alcohol Misuse Screening due??01/02/22??and every 1??year(s)  ??? ??Due?  ??? ? ? ?ADL Screening due??04/26/22??and every 1??year(s)  ??? ? ? ?Aspirin Therapy for CVD Prevention due??04/26/22??and every 1??year(s)  ??? ? ? ?Cervical Cancer Screening due??04/26/22??Unknown Frequency  ??? ? ? ?Colorectal Screening due??04/26/22??Unknown Frequency  ??? ? ? ?Lipid Screening due??04/26/22??Unknown Frequency  ??? ? ? ?Lung Cancer Screening due??04/26/22??and every 1??year(s)  ??? ? ? ?Tetanus Vaccine due??04/26/22??and every 10??year(s)  ??? ? ? ?Zoster Vaccine due??04/26/22??Unknown Frequency  ??? ??Due In Future?  ??? ? ? ?Obesity Screening not due until??01/01/23??and every 1??year(s)  ???Satisfied?(in the past 1 year)  ??? ??Satisfied?  ??? ? ? ?Blood Pressure Screening on??04/26/22.??Satisfied by SAMIR Sarmiento Ericka  ??? ?  ? ?Body Mass Index Check on??04/26/22.??Satisfied by SAMIR Sarmiento Ericka  ??? ? ? ?Breast Cancer Screening on??12/21/21.??Satisfied by Lluvia Keller  ??? ? ? ?Depression Screening on??04/26/22.??Satisfied by SAMIR Sarmiento Ericka  ??? ? ? ?Diabetes Screening on??04/26/22.??Satisfied by Violeta Dowd  ??? ? ? ?Influenza Vaccine on??03/29/22.??Satisfied by Leah Perea  ??? ? ? ?Obesity Screening on??04/26/22.??Satisfied by SAMIR Sarmiento Ericka  ?  Lab Results  Test Name Test Result Date/Time Comments   Potassium Lvl 4.3 mmol/L 04/26/2022 09:22 CDT    Chloride 106 mmol/L 04/26/2022 09:22 CDT    CO2 29 mmol/L 04/26/2022 09:22 CDT    Calcium Lvl 9.7 mg/dL 04/26/2022 09:22 CDT    Glucose Lvl 143 mg/dL (High) 04/26/2022 09:22 CDT    BUN 10.4 mg/dL 04/26/2022 09:22 CDT    Creatinine 0.88 mg/dL 04/26/2022 09:22 CDT    eGFR-AA 83 (Low) 04/26/2022 09:22 CDT    eGFR-ARMAND 69 mL/min/1.73 m2 (Low) 04/26/2022 09:22 CDT    Bili Total 0.4 mg/dL 04/26/2022 09:22 CDT    AST 10 unit/L 04/26/2022 09:22 CDT    ALT 9 unit/L 04/26/2022 09:22 CDT    Alk Phos 89 unit/L 04/26/2022 09:22 CDT    Albumin Lvl 3.3 gm/dL (Low) 04/26/2022 09:22 CDT    WBC 0.9 x10(3)/mcL (Critical) 04/26/2022 09:22 CDT Critical result called to sylvie Jimenez by MESLY on4/26/2022 10:05:39 CDT _ and verified by verbal readback.   Hgb 13.1 gm/dL 04/26/2022 09:22 CDT    Hct 39.1 % 04/26/2022 09:22 CDT    Platelet 187 x10(3)/mcL 04/26/2022 09:22 CDT    MCV 95.4 fL 04/26/2022 09:22 CDT    Neutro Auto 4 % 04/26/2022 09:22 CDT    Mono Auto 22 % 04/26/2022 09:22 CDT

## 2022-06-01 ENCOUNTER — OFFICE VISIT (OUTPATIENT)
Dept: HEMATOLOGY/ONCOLOGY | Facility: CLINIC | Age: 67
End: 2022-06-01
Payer: MEDICAID

## 2022-06-01 VITALS
HEIGHT: 64 IN | OXYGEN SATURATION: 100 % | RESPIRATION RATE: 18 BRPM | TEMPERATURE: 98 F | DIASTOLIC BLOOD PRESSURE: 69 MMHG | HEART RATE: 104 BPM | WEIGHT: 154.38 LBS | BODY MASS INDEX: 26.36 KG/M2 | SYSTOLIC BLOOD PRESSURE: 110 MMHG

## 2022-06-01 DIAGNOSIS — Z51.11 ENCOUNTER FOR CHEMOTHERAPY MANAGEMENT: ICD-10-CM

## 2022-06-01 DIAGNOSIS — C50.911 INVASIVE DUCTAL CARCINOMA OF BREAST, RIGHT: Primary | ICD-10-CM

## 2022-06-01 PROCEDURE — 3008F BODY MASS INDEX DOCD: CPT | Mod: CPTII,,, | Performed by: NURSE PRACTITIONER

## 2022-06-01 PROCEDURE — 3074F SYST BP LT 130 MM HG: CPT | Mod: CPTII,,, | Performed by: NURSE PRACTITIONER

## 2022-06-01 PROCEDURE — 3078F PR MOST RECENT DIASTOLIC BLOOD PRESSURE < 80 MM HG: ICD-10-PCS | Mod: CPTII,,, | Performed by: NURSE PRACTITIONER

## 2022-06-01 PROCEDURE — 1159F PR MEDICATION LIST DOCUMENTED IN MEDICAL RECORD: ICD-10-PCS | Mod: CPTII,,, | Performed by: NURSE PRACTITIONER

## 2022-06-01 PROCEDURE — 3078F DIAST BP <80 MM HG: CPT | Mod: CPTII,,, | Performed by: NURSE PRACTITIONER

## 2022-06-01 PROCEDURE — 3074F PR MOST RECENT SYSTOLIC BLOOD PRESSURE < 130 MM HG: ICD-10-PCS | Mod: CPTII,,, | Performed by: NURSE PRACTITIONER

## 2022-06-01 PROCEDURE — 99213 OFFICE O/P EST LOW 20 MIN: CPT | Mod: PBBFAC | Performed by: NURSE PRACTITIONER

## 2022-06-01 PROCEDURE — 99214 PR OFFICE/OUTPT VISIT, EST, LEVL IV, 30-39 MIN: ICD-10-PCS | Mod: S$PBB,,, | Performed by: NURSE PRACTITIONER

## 2022-06-01 PROCEDURE — 99214 OFFICE O/P EST MOD 30 MIN: CPT | Mod: S$PBB,,, | Performed by: NURSE PRACTITIONER

## 2022-06-01 PROCEDURE — 3008F PR BODY MASS INDEX (BMI) DOCUMENTED: ICD-10-PCS | Mod: CPTII,,, | Performed by: NURSE PRACTITIONER

## 2022-06-01 PROCEDURE — 1159F MED LIST DOCD IN RCRD: CPT | Mod: CPTII,,, | Performed by: NURSE PRACTITIONER

## 2022-06-01 RX ORDER — DIPHENHYDRAMINE HYDROCHLORIDE 50 MG/ML
25 INJECTION INTRAMUSCULAR; INTRAVENOUS
Status: CANCELLED
Start: 2022-06-02

## 2022-06-01 RX ORDER — DOXORUBICIN HYDROCHLORIDE 2 MG/ML
60 INJECTION, SOLUTION INTRAVENOUS
Status: CANCELLED | OUTPATIENT
Start: 2022-06-02

## 2022-06-01 RX ORDER — SODIUM CHLORIDE 0.9 % (FLUSH) 0.9 %
10 SYRINGE (ML) INJECTION
Status: CANCELLED | OUTPATIENT
Start: 2022-06-02

## 2022-06-01 RX ORDER — HEPARIN 100 UNIT/ML
500 SYRINGE INTRAVENOUS
Status: CANCELLED | OUTPATIENT
Start: 2022-06-02

## 2022-06-01 NOTE — PROGRESS NOTES
Problem List:  Invasive ductal Carcinoma    Current Treatment:  1. Doxorubicin 60 mg/m² IV day 1;  2. Cyclophosphamide 600 mg/m² IV day 1;  Cycled every 14 days for 4 cycles; followed by,  3. Paclitaxel 175 mg/m² by 3 hour IV infusion on day 1, cycled every 14 days for 4 cycles; or, paclitaxel 80 mg/m² IV 1 hour infusion weekly for 12 weeks.  All dose-dense cycles are with myeloid growth factor support.    Start 4/13/2022    Past medical history: Obesity. Tobacco abuse.  -02/19/2020: Acute nonocclusive DVT in the right distal femoral vein; acute occlusive DVTs right popliteal and right peroneal veins ((she never followed up on that)  Procedure/surgical history: Cholecystectomy (2001). Bilateral tubal ligation (according to her).  Social history: . Lives in [place], [place]. Has 2 children. Children. Does not work. Smoked half a pack of cigarettes daily for 40 years; quit 2 weeks ago. No alcohol or illicit drugs.  Family history: No family history of cancer.  Health maintenance: According to her, screening colonoscopy 5 years ago at [facility], probably revealing a benign polyp.  Menstrual and OB/GYN history: Menarche, age 13. Menopause, age 59. G2, P2. No abortions or miscarriages. Age at delivery of first child, 22. Did not breast-feed her children. Used birth control pills for 1 year, subsequently, bilateral tubal ligation. No history of hormone replacement therapy.    History of Present Illness:  66-year-old lady referred from surgery, with breast cancer.    She initially presented to emergency department 11/15/2021 for evaluation of breast pain and a palpable lump in the right breast. Subsequent imaging studies and biopsy established diagnosis of invasive ductal carcinoma of right breast, axillary lymph node positive, ER low positive, KY negative, and HER-2 negative.    Physical examination by surgery on 01/27/2022 makes note of 3 cm x 3 cm right breast periareolar mass with overlying erythema and nipple  retraction; no nipple discharge. Left breast without masses, skin changes, dimpling, nipple retraction or discharge. Right axilla with biopsy scar overlying small palpable mass.    ER low positive (1%). ND negative (0%). HER-2 equivocal (score 2+); HER-2 negative/not amplified (by FISH) Ki-67 high proliferation (50%).    Interval History   Seen in follow up today, 6/1/22; unaccompanied; tolerating chemotherapy well. Receiving C4 ddAC tomorrow. Stable energy level and appetite. Labs stable. Denies significant problems or concerns.     Review of Systems: A complete 12-point ROS was performed in full with pertinent positives as described in interval history. Remainder of ROS done in full and are negative.    Physical Exam  Vitals & Measurements  Vitals:    06/01/22 0934   BP: 110/69   Pulse: 104   Resp: 18   Temp: 97.9 °F (36.6 °C)     Physical Exam:  General: Alert and oriented, No acute distress.   Appearance: Well-groomed wearing mask  HEENT: Normocephalic, Oral mucosa is moist. Pupils are equal, round and reactive to light, Extraocular movements are intact, Normal conjunctiva.   Neck: Supple, Non-tender, No lymphadenopathy, No thyromegaly.   Respiratory: Lungs are clear to auscultation, Respirations are non-labored, Breath sounds are equal, Symmetrical chest wall expansion.   Cardiovascular: Normal rate, Regular rhythm, No edema.   Breast:Right breast inferior outer portion hardened, non-tender, no signs of inflammation/infection  Gastrointestinal: Rounded, Soft, Non-tender, Non-distended, Normal bowel sounds.   Musculoskeletal: Normal strength. Ambulates without assistance  Integumentary: Warm, dry, intact.   Neurologic: Alert, Oriented, No focal deficits, Cranial Nerves II-XII are grossly intact.   Cognition and Speech: Oriented, Speech clear and coherent.   Psychiatric: Cooperative, Appropriate mood & affect.   ECOG Performance Scale: 0 - Capable of all self-care no restrictions    Lab Results   Component Value  Date    WBC 9.3 06/01/2022    RBC 3.26 (L) 06/01/2022    HGB 10.4 (L) 06/01/2022    HCT 31.4 (L) 06/01/2022    MCV 96.3 (H) 06/01/2022    MCH 31.9 (H) 06/01/2022    MCHC 33.1 06/01/2022    RDW 13.2 06/01/2022     06/01/2022    MPV 10.4 06/01/2022     CMP  Sodium Level   Date Value Ref Range Status   06/01/2022 144 136 - 145 mmol/L Final     Comment:     Oncology use only     Potassium Level   Date Value Ref Range Status   06/01/2022 3.5 3.5 - 5.1 mmol/L Final     Comment:     Oncology use only     Carbon Dioxide   Date Value Ref Range Status   06/01/2022 26 23 - 31 mmol/L Final     Comment:     Oncology use only     Blood Urea Nitrogen   Date Value Ref Range Status   06/01/2022 14.5 9.8 - 20.1 mg/dL Final     Comment:     Oncology use only     Creatinine   Date Value Ref Range Status   06/01/2022 1.04 (H) 0.55 - 1.02 mg/dL Final     Comment:     Oncology use only     Calcium Level Total   Date Value Ref Range Status   06/01/2022 9.9 8.4 - 10.2 mg/dL Final     Comment:     Oncology use only     Albumin Level   Date Value Ref Range Status   06/01/2022 3.6 3.4 - 4.8 gm/dL Final     Comment:     Oncology use only     Bilirubin Total   Date Value Ref Range Status   06/01/2022 0.4 <=1.5 mg/dL Final     Comment:     Oncology use only     Alkaline Phosphatase   Date Value Ref Range Status   06/01/2022 96 40 - 150 unit/L Final     Comment:     Oncology use only     Aspartate Aminotransferase   Date Value Ref Range Status   06/01/2022 11 5 - 34 unit/L Final     Comment:     Oncology use only     Alanine Aminotransferase   Date Value Ref Range Status   06/01/2022 10 0 - 55 unit/L Final     Comment:     Oncology use only     Estimated GFR-Non    Date Value Ref Range Status   06/01/2022 57 mls/min/1.73/m2 Final       Assessment:  IDC right breast, presumed triple negative:  -History of right lower extremity DVT and right breast mass 02/2020; she did not follow-up  -Presentation: 11/2021: Palpable  mass  -01/07/2022: Bilateral diagnostic mammogram with contrast and limited ultrasound right breast  -01/07/2022: Biopsy  -3.1 cm mass on mammogram, overall grade 3, right axillary lymph node biopsy positive  -ER low positive (1%). KY negative (0%). HER-2 negative by FISH. Ki-67 high proliferation (50%)  >>>   For the purpose of neoadjuvant chemotherapy, since ER iis low positive, we will treat the patient as if triple negative  >>>   -cT2 cN1 MX, grade 3, presumed triple negative, AJCC anatomic stage IIB, clinical prognostic stage IIIB  -02/22/2022: DEXA scan: Normal BMD  -02/22/2022: CT C/A/P with contrast for staging: Right breast mass with right axillary lymphadenopathy; bilateral adrenal nodules, statistically representing adenomas  -02/22/2022: Whole-body nuclear medicine bone scan: No bone metastasis  -03/02/2022: Mediport placed  -03/11/2022: TTE: LVEF 55-60%    History of right lower extremity DVT  -02/19/2020: (Right leg swelling for 3 days) acute nonocclusive DVT in the right distal femoral vein; acute occlusive DVTs right popliteal and right peroneal veins  -Apparently, was also noted to have a breast lump (right breast, 3:00, a small lump, nontender, slightly irregular on palpation, not hard or fluctuant, no erythema)  -Apparently, [facility] ambulatory clinics tried to contact her but without any response    Normal BMD on baseline DEXA scan.    Plan  -Despite the fact that ER is low positive (1%), regardless, in due course, will need adjuvant endocrine therapy  -Since ER positive (low positive), HER-2 negative, at least 1 axillary lymph node positive, grade 3 disease, and Ki-67 score >20%, therefore, in due course, will benefit from 2 years of adjuvant abemaciclib in combination with endocrine therapy    -Since this tumor may behave more like triple negative breast cancer, therefore, we have ordered genetic testing and counseling     In this case, indications of neoadjuvant chemotherapy:  -Presumed TNBC; cT2,  cN1  -In this situation, complete pathologic response to preoperative systemic therapy is associated with an extremely favorable disease-free and overall survival, particularly in situations in which our treatment is given preoperatively  -Tumor response assessment: Clinical exam and diagnostic mammogram/ultrasound  >>>   -Proceed with neoadjuvant chemotherapy with DD AC x 4 cycles, followed by neoadjuvant Taxol  -Assess response with physical examination and repeat diagnostic mammogram and ultrasound of right breast after 4 cycles of neoadjuvant DD AC    Chemotherapy regimen:  1. Doxorubicin 60 mg/m² IV day 1;  2. Cyclophosphamide 600 mg/m² IV day 1;  Cycled every 14 days for 4 cycles; followed by,  3. Paclitaxel 175 mg/m² by 3 hour IV infusion on day 1, cycled every 14 days for 4 cycles; or, paclitaxel 80 mg/m² IV 1 hour infusion weekly for 12 weeks.  All dose-dense cycles are with myeloid growth factor support.    After neoadjuvant chemotherapy, BCS or mastectomy and surgical axillary staging.    -Adjuvant RT will depend upon lumpectomy versus mastectomy status, as well as surgical pathology  (in this case, since the right axillary lymph node is biopsy positive, therefore,  will need adjuvant radiotherapy whether she undergoes lumpectomy or mastectomy)    Adjuvant systemic therapy after preoperative systemic therapy:  1. Consider adjuvant bisphosphonate therapy for risk reduction of distant metastasis for 3-5 years in postmenopausal patients with high risk node-negative or node positive tumors  >> Will offer her zoledronic acid 4 mg IV every 6 months x3-5 years in the adjuvant setting;  2. Adjuvant endocrine therapy appropriate for ER positive postmenopausal patient;  3. Adjuvant olaparib if germline BRCA1/2 mutation positive and residual disease after preoperative therapy and a clinical stage, pathologic stage, estrogen receptor status, and tumor grade (CPS+EG) = 3 or >3 (tumor is ER positive, HER-2  negative)  (If patient qualifies for olaparib, then, olaparib can be used concurrently with endocrine therapy but only after adjuvant radiotherapy)  4. This patient has at least 1 axillary lymph node positive; has grade 3 disease; Ki-67 score is 50%; therefore, 2 years of adjuvant abemaciclib can be considered in combination with endocrine therapy    Reviewed / discussed labs; stable. Ok to proceed with C4 DDAC tomorrow as planned   Will need right diagnostic mammo/US prior to starting neoadjuvant Taxol; this has been ordered  RTC for labs, provider visit, initiation on dose dense Taxol in 2 weeks - her preference is to pursue therapy every 2 weeks vs weekly   Advised to contact the office in the interim for any clinical questions or concerns

## 2022-06-02 ENCOUNTER — INFUSION (OUTPATIENT)
Dept: INFUSION THERAPY | Facility: HOSPITAL | Age: 67
End: 2022-06-02
Attending: INTERNAL MEDICINE
Payer: MEDICAID

## 2022-06-02 VITALS
SYSTOLIC BLOOD PRESSURE: 128 MMHG | TEMPERATURE: 98 F | DIASTOLIC BLOOD PRESSURE: 69 MMHG | RESPIRATION RATE: 18 BRPM | OXYGEN SATURATION: 98 % | HEART RATE: 98 BPM

## 2022-06-02 DIAGNOSIS — C50.919 MALIGNANT NEOPLASM OF BREAST IN FEMALE, ESTROGEN RECEPTOR NEGATIVE, UNSPECIFIED LATERALITY, UNSPECIFIED SITE OF BREAST: Primary | ICD-10-CM

## 2022-06-02 DIAGNOSIS — Z17.1 MALIGNANT NEOPLASM OF BREAST IN FEMALE, ESTROGEN RECEPTOR NEGATIVE, UNSPECIFIED LATERALITY, UNSPECIFIED SITE OF BREAST: Primary | ICD-10-CM

## 2022-06-02 PROCEDURE — 96411 CHEMO IV PUSH ADDL DRUG: CPT

## 2022-06-02 PROCEDURE — 63600175 PHARM REV CODE 636 W HCPCS: Mod: TB | Performed by: NURSE PRACTITIONER

## 2022-06-02 PROCEDURE — 96375 TX/PRO/DX INJ NEW DRUG ADDON: CPT

## 2022-06-02 PROCEDURE — 96367 TX/PROPH/DG ADDL SEQ IV INF: CPT

## 2022-06-02 PROCEDURE — 25000003 PHARM REV CODE 250: Performed by: NURSE PRACTITIONER

## 2022-06-02 PROCEDURE — 96413 CHEMO IV INFUSION 1 HR: CPT

## 2022-06-02 RX ORDER — DOXORUBICIN HYDROCHLORIDE 2 MG/ML
60 INJECTION, SOLUTION INTRAVENOUS
Status: COMPLETED | OUTPATIENT
Start: 2022-06-02 | End: 2022-06-02

## 2022-06-02 RX ORDER — DIPHENHYDRAMINE HYDROCHLORIDE 50 MG/ML
25 INJECTION INTRAMUSCULAR; INTRAVENOUS
Status: COMPLETED | OUTPATIENT
Start: 2022-06-02 | End: 2022-06-02

## 2022-06-02 RX ORDER — SODIUM CHLORIDE 0.9 % (FLUSH) 0.9 %
10 SYRINGE (ML) INJECTION
Status: DISCONTINUED | OUTPATIENT
Start: 2022-06-02 | End: 2022-06-02 | Stop reason: HOSPADM

## 2022-06-02 RX ORDER — HEPARIN SODIUM (PORCINE) LOCK FLUSH IV SOLN 100 UNIT/ML 100 UNIT/ML
100 SOLUTION INTRAVENOUS
Status: DISCONTINUED | OUTPATIENT
Start: 2022-06-02 | End: 2022-06-02 | Stop reason: HOSPADM

## 2022-06-02 RX ADMIN — CYCLOPHOSPHAMIDE 1100 MG: 1 INJECTION INTRAVENOUS at 09:06

## 2022-06-02 RX ADMIN — DEXAMETHASONE SODIUM PHOSPHATE 0.25 MG: 4 INJECTION, SOLUTION INTRA-ARTICULAR; INTRALESIONAL; INTRAMUSCULAR; INTRAVENOUS; SOFT TISSUE at 08:06

## 2022-06-02 RX ADMIN — APREPITANT 130 MG: 130 INJECTION, EMULSION INTRAVENOUS at 08:06

## 2022-06-02 RX ADMIN — SODIUM CHLORIDE: 9 INJECTION, SOLUTION INTRAVENOUS at 08:06

## 2022-06-02 RX ADMIN — DIPHENHYDRAMINE HYDROCHLORIDE 25 MG: 50 INJECTION, SOLUTION INTRAMUSCULAR; INTRAVENOUS at 08:06

## 2022-06-02 RX ADMIN — DOXORUBICIN HYDROCHLORIDE 112 MG: 2 INJECTION, SOLUTION INTRAVENOUS at 09:06

## 2022-06-02 NOTE — NURSING
08:00am Patient here for C4 DDAC. Labs from 6/1 reviewed by Kasey Vásquez RN. Patient voices no c/o.

## 2022-06-03 ENCOUNTER — INFUSION (OUTPATIENT)
Dept: INFUSION THERAPY | Facility: HOSPITAL | Age: 67
End: 2022-06-03
Attending: INTERNAL MEDICINE
Payer: MEDICAID

## 2022-06-03 VITALS
HEART RATE: 68 BPM | RESPIRATION RATE: 18 BRPM | BODY MASS INDEX: 26.48 KG/M2 | SYSTOLIC BLOOD PRESSURE: 114 MMHG | WEIGHT: 154.31 LBS | DIASTOLIC BLOOD PRESSURE: 67 MMHG | OXYGEN SATURATION: 36 %

## 2022-06-03 DIAGNOSIS — C50.919 MALIGNANT NEOPLASM OF BREAST IN FEMALE, ESTROGEN RECEPTOR NEGATIVE, UNSPECIFIED LATERALITY, UNSPECIFIED SITE OF BREAST: Primary | ICD-10-CM

## 2022-06-03 DIAGNOSIS — Z17.1 MALIGNANT NEOPLASM OF BREAST IN FEMALE, ESTROGEN RECEPTOR NEGATIVE, UNSPECIFIED LATERALITY, UNSPECIFIED SITE OF BREAST: Primary | ICD-10-CM

## 2022-06-03 PROCEDURE — 96372 THER/PROPH/DIAG INJ SC/IM: CPT

## 2022-06-03 PROCEDURE — 63600175 PHARM REV CODE 636 W HCPCS: Mod: TB | Performed by: NURSE PRACTITIONER

## 2022-06-03 RX ADMIN — PEGFILGRASTIM 6 MG: 6 INJECTION SUBCUTANEOUS at 12:06

## 2022-06-13 PROBLEM — Z86.718 HISTORY OF DEEP VENOUS THROMBOSIS (DVT) OF DISTAL VEIN OF RIGHT LOWER EXTREMITY: Status: ACTIVE | Noted: 2022-06-13

## 2022-06-13 PROBLEM — C50.911 BREAST CANCER, RIGHT: Status: ACTIVE | Noted: 2022-04-07

## 2022-06-15 ENCOUNTER — CLINICAL SUPPORT (OUTPATIENT)
Dept: HEMATOLOGY/ONCOLOGY | Facility: CLINIC | Age: 67
End: 2022-06-15
Payer: MEDICAID

## 2022-06-15 DIAGNOSIS — C50.911 INVASIVE DUCTAL CARCINOMA OF BREAST, RIGHT: ICD-10-CM

## 2022-06-15 LAB
ABS NEUT CALC (OHS): 6.75 X10(3)/MCL (ref 2.1–9.2)
ALBUMIN SERPL-MCNC: 3.8 GM/DL (ref 3.4–4.8)
ALBUMIN/GLOB SERPL: 1.2 RATIO (ref 1.1–2)
ALP SERPL-CCNC: 107 UNIT/L (ref 40–150)
ALT SERPL-CCNC: 13 UNIT/L (ref 0–55)
ANISOCYTOSIS BLD QL SMEAR: ABNORMAL
AST SERPL-CCNC: 12 UNIT/L (ref 5–34)
BASOPHILS NFR BLD MANUAL: 0.09 X10(3)/MCL (ref 0–0.2)
BASOPHILS NFR BLD MANUAL: 1 % (ref 0–2)
BILIRUBIN DIRECT+TOT PNL SERPL-MCNC: 0.8 MG/DL
BUN SERPL-MCNC: 23.9 MG/DL (ref 9.8–20.1)
CALCIUM SERPL-MCNC: 10.1 MG/DL (ref 8.4–10.2)
CHLORIDE SERPL-SCNC: 108 MMOL/L (ref 98–107)
CO2 SERPL-SCNC: 21 MMOL/L (ref 23–31)
CREAT SERPL-MCNC: 1.17 MG/DL (ref 0.55–1.02)
ERYTHROCYTE [DISTWIDTH] IN BLOOD BY AUTOMATED COUNT: 14.6 % (ref 11.5–17)
GLOBULIN SER-MCNC: 3.1 GM/DL (ref 2.4–3.5)
GLUCOSE SERPL-MCNC: 178 MG/DL (ref 82–115)
HCT VFR BLD AUTO: 26.6 % (ref 37–47)
HGB BLD-MCNC: 9.3 GM/DL (ref 12–16)
IMM GRANULOCYTES # BLD AUTO: 1.35 X10(3)/MCL (ref 0–0.02)
IMM GRANULOCYTES NFR BLD AUTO: 15 % (ref 0–0.43)
LYMPH ABN # BLD MANUAL: 1 %
LYMPHOCYTES NFR BLD MANUAL: 1.26 X10(3)/MCL
LYMPHOCYTES NFR BLD MANUAL: 14 % (ref 13–40)
MAGNESIUM SERPL-MCNC: 2 MG/DL (ref 1.6–2.6)
MCH RBC QN AUTO: 33.1 PG (ref 27–31)
MCHC RBC AUTO-ENTMCNC: 35 MG/DL (ref 33–36)
MCV RBC AUTO: 94.7 FL (ref 80–94)
MONOCYTES NFR BLD MANUAL: 0.81 X10(3)/MCL (ref 0.1–1.3)
MONOCYTES NFR BLD MANUAL: 9 % (ref 2–11)
NEUTROPHILS NFR BLD MANUAL: 72 % (ref 47–80)
NEUTS BAND NFR BLD MANUAL: 3 % (ref 0–11)
NRBC BLD AUTO-RTO: 2.2 %
PLATELET # BLD AUTO: 144 X10(3)/MCL (ref 130–400)
PLATELET # BLD EST: ADEQUATE 10*3/UL
PMV BLD AUTO: 10.8 FL (ref 9.4–12.4)
POIKILOCYTOSIS BLD QL SMEAR: ABNORMAL
POLYCHROMASIA BLD QL SMEAR: ABNORMAL
POTASSIUM SERPL-SCNC: 3.5 MMOL/L (ref 3.5–5.1)
PROT SERPL-MCNC: 6.9 GM/DL (ref 5.8–7.6)
RBC # BLD AUTO: 2.81 X10(6)/MCL (ref 4.2–5.4)
RBC MORPH BLD: ABNORMAL
SODIUM SERPL-SCNC: 143 MMOL/L (ref 136–145)
TEAR DROP CELL (OLG): SLIGHT
WBC # SPEC AUTO: 9 X10(3)/MCL (ref 4.5–11.5)

## 2022-06-15 PROCEDURE — 36415 COLL VENOUS BLD VENIPUNCTURE: CPT

## 2022-06-15 PROCEDURE — 80053 COMPREHEN METABOLIC PANEL: CPT

## 2022-06-15 PROCEDURE — 83735 ASSAY OF MAGNESIUM: CPT

## 2022-06-15 PROCEDURE — 85025 COMPLETE CBC W/AUTO DIFF WBC: CPT

## 2022-06-17 ENCOUNTER — HOSPITAL ENCOUNTER (OUTPATIENT)
Dept: RADIOLOGY | Facility: HOSPITAL | Age: 67
Discharge: HOME | End: 2022-06-17
Attending: NURSE PRACTITIONER
Payer: MEDICAID

## 2022-06-17 ENCOUNTER — DOCUMENTATION ONLY (OUTPATIENT)
Dept: HEMATOLOGY/ONCOLOGY | Facility: CLINIC | Age: 67
End: 2022-06-17
Payer: MEDICAID

## 2022-06-17 DIAGNOSIS — C50.911 INVASIVE DUCTAL CARCINOMA OF BREAST, RIGHT: ICD-10-CM

## 2022-06-17 PROCEDURE — 25500020 PHARM REV CODE 255

## 2022-06-17 PROCEDURE — 77066 DX MAMMO INCL CAD BI: CPT | Mod: TC

## 2022-06-17 PROCEDURE — 77066 DX MAMMO INCL CAD BI: CPT | Mod: 26,,, | Performed by: RADIOLOGY

## 2022-06-17 PROCEDURE — 76642 ULTRASOUND BREAST LIMITED: CPT | Mod: TC,RT

## 2022-06-17 PROCEDURE — 76642 US BREAST RIGHT LIMITED: ICD-10-PCS | Mod: 26,RT,, | Performed by: RADIOLOGY

## 2022-06-17 PROCEDURE — 77066 MAMMO DIGITAL DIAGNOSTIC WITH CONTRAST, BILATERAL: ICD-10-PCS | Mod: 26,,, | Performed by: RADIOLOGY

## 2022-06-17 PROCEDURE — 76642 ULTRASOUND BREAST LIMITED: CPT | Mod: 26,RT,, | Performed by: RADIOLOGY

## 2022-06-17 RX ADMIN — IOHEXOL 100 ML: 350 INJECTION, SOLUTION INTRAVENOUS at 09:06

## 2022-06-17 NOTE — PROGRESS NOTES
No showed 06/15/2022:      Past medical history: Obesity.  Tobacco abuse.   -02/19/2020: Acute nonocclusive DVT in the right distal femoral vein; acute occlusive DVTs right popliteal and right peroneal veins ((she never followed up on that)  Procedure/surgical history: Cholecystectomy (2001).  Bilateral tubal ligation (according to her).  Social history: .  Lives in [place], [place].  Has 2 children.  Children.  Does not work.  Smoked half a pack of cigarettes daily for 40 years; quit 2 weeks ago.  No alcohol or illicit drugs.  Family history: No family history of cancer.  Health maintenance: According to her, screening colonoscopy 5 years ago at [facility], probably revealing a benign polyp.  Menstrual and OB/GYN history: Menarche, age 13.  Menopause, age 59.  G2, P2.  No abortions or miscarriages.  Age at delivery of first child, 22.  Did not breast-feed her children.  Used birth control pills for 1 year, subsequently, bilateral tubal ligation.  No history of hormone replacement therapy.      Reason for follow-up:   -IDC right breast, presumed triple negative, AJCC stage IIB, clinical prognostic stage IIIB   -History of right lower extremity DVT      History of present illness:   66-year-old lady referred from surgery, with breast cancer.     She initially presented to emergency department 11/15/2021 for evaluation of breast pain and a palpable lump in the right breast.  Subsequent imaging studies and biopsy established diagnosis of invasive ductal carcinoma of right breast, axillary lymph node positive, ER low positive, IA negative, and HER-2 negative.     Physical examination by surgery on 01/27/2022 makes note of 3 cm x 3 cm right breast periareolar mass with overlying erythema and nipple retraction; no nipple discharge.  Left breast without masses, skin changes, dimpling, nipple retraction or discharge.  Right axilla with biopsy scar overlying small palpable mass.     12/21/2021: Diagnostic bilateral  mammogram, limited ultrasound of both breasts (comparison: 11/15/2021):   -Overall study BI-RADS 4: Suspicious of malignancy     -01/07/2022: Bilateral diagnostic mammogram with contrast and limited ultrasound right breast:   -Right breast: Highly suggestive of malignancy (BI-RADS 5)   -Left breast: Negative (BI-RADS 1)     01/07/2022: Ultrasound-guided core biopsy right breast:   1.  Right axillary 3.5 x 0.9 x 1.4 cm lymph node with 7 mm cortex at 11:00, 13 cm from nipple, needle biopsy: Carcinoma, consistent with a breast primary   2.  Right upper central breast 8 x 4 x 8 mm mass at 1:00, 5 cm from nipple, ultrasound-guided needle biopsy:: Complex sclerosing lesion/sclerosing adenosis   3.  Right retroareolar breast 3 x 3.1 x 2.1 cm mass, ultrasound-guided needle biopsy: Invasive ductal carcinoma; overall grade 3     ER low positive (1%).  AR negative (0%).  HER-2 equivocal (score 2+); HER-2 negative/not amplified (by FISH) Ki-67 high proliferation (50%).      02/10/2022:   Presents for initial medical oncology consultation, accompanied by her .  Pleasant lady.  In no acute discomfort.   Occasional, mild right breast pain.  No skin changes or nipple discharge.   Good appetite.  No weakness or fatigue.  No new lumps or lymphadenopathy.  No abdominal pain, nausea, vomiting, GI bleeding, unusual headaches, focal neurological symptoms, chest pain, dyspnea, hemoptysis, etc.  ECOG 0-1.      Interval history:     03/29/2022:   -02/22/2022: DEXA scan: Normal BMD   -02/22/2022: CT C/A/P with contrast for staging: Right breast mass with right axillary lymphadenopathy; bilateral adrenal nodules, statistically representing adenomas   -02/22/2022: Whole-body nuclear medicine bone scan: No bone metastasis   -03/02/2022: Mediport placed   -03/11/2022: TTE: LVEF 55-60%   Presents for a follow-up visit.  Complains of pain in the right nipple and perianal area secondary to cancer.  No other complaints.  Good appetite.  Good  energy.  No unusual headaches, focal neurological symptoms, weakness, fatigue, anorexia, chest pain, cough, dyspnea, etc.  We discussed all labs and scans in detail.    06/15/2022:  -neoadjuvant DD AC started 04/13/2022; severe neutropenia, ANC 0.03, on 04/26/2022; therefore, cycle 2 held; treated with growth factor and prophylactic ciprofloxacin  -cycle 2 on 05/05/2022; cycle 3 on 05/19/2022; cycle 4 on 06/02/2022      Review of systems:   All systems reviewed, and found to be negative except for the symptoms detailed above.      Physical examination:   VITAL SIGNS:  Reviewed.      GENERAL:  In no apparent distress.   HEAD:  No signs of head trauma.   EYES:  Pupils are equal.  Extraocular motions intact.   EARS:  Hearing grossly intact.   MOUTH:  Oropharynx is normal.   NECK:  No adenopathy, no JVD.      CHEST:  Chest with clear breath sounds bilaterally.  No wheezes, rales, or rhonchi.   CARDIAC:  Regular rate and rhythm.  S1 and S2, without murmurs, gallops, or rubs.   VASCULAR:  No Edema.  Peripheral pulses normal and equal in all extremities.   ABDOMEN:  Soft, without detectable tenderness.  No sign of distention.  No   rebound or guarding, and no masses palpated.   Bowel Sounds normal.   MUSCULOSKELETAL:  Good range of motion of all major joints. Extremities without clubbing, cyanosis or edema.   NEUROLOGIC EXAM:  Alert and oriented x 3.  No focal sensory or strength deficits.   Speech normal.  Follows commands.   PSYCHIATRIC:  Mood normal.   SKIN:  No rash or lesions.      Assessment:   #IDC right breast, presumed triple negative:   -History of right lower extremity DVT and right breast mass 02/2020; she did not follow-up   -Presentation: 11/2021: Palpable mass   -01/07/2022: Bilateral diagnostic mammogram with contrast and limited ultrasound right breast   -01/07/2022: Biopsy   -3.1 cm mass on mammogram, overall grade 3, right axillary lymph node biopsy positive   -ER low positive (1%).  NC negative (0%).   HER-2 negative by FISH.  Ki-67 high proliferation (50%)   >>>   For the purpose of neoadjuvant chemotherapy, since ER iis low positive, we will treat the patient as if triple negative   >>>  -cT2 cN1 MX, grade 3, presumed triple negative, AJCC anatomic stage IIB, clinical prognostic stage IIIB   -02/22/2022: DEXA scan: Normal BMD   -02/22/2022: CT C/A/P with contrast for staging: Right breast mass with right axillary lymphadenopathy; bilateral adrenal nodules, statistically representing adenomas   -02/22/2022: Whole-body nuclear medicine bone scan: No bone metastasis   -03/02/2022: Mediport placed   -03/11/2022: TTE: LVEF 55-60%  -neoadjuvant DD AC started 04/13/2022; severe neutropenia, ANC 0.03, on 04/26/2022; therefore, cycle 2 held; treated with growth factor and prophylactic ciprofloxacin  -cycle 2 on 05/05/2022; cycle 3 on 05/19/2022; cycle 4 on 06/02/2022      #History of right lower extremity DVT 02/19/2020:   -02/19/2020: (Right leg swelling for 3 days) acute nonocclusive DVT in the right distal femoral vein; acute occlusive DVTs right popliteal and right peroneal veins   -Apparently, was also noted to have a breast lump (right breast, 3:00, a small lump, nontender, slightly irregular on palpation, not hard or fluctuant, no erythema)   -Apparently, [facility] ambulatory clinics tried to contact her but without any response      Plan:   -Despite the fact that ER is low positive (1%), regardless, in due course, will need adjuvant endocrine therapy   -Since ER positive (low positive), HER-2 negative, at least 1 axillary lymph node positive, grade 3 disease, and Ki-67 score >20%, therefore, in due course, will benefit from 2 years of adjuvant abemaciclib in combination with endocrine therapy   -Since this tumor may behave more like triple negative breast cancer, therefore, we have ordered genetic testing and counseling   -s/p neoadjuvant DD AC x4 (04/30/2022-06/02/2022)  >>>  -assess response with diagnostic mammogram  and ultrasound of right breast at this time  -proceed with neoadjuvant Taxol    Chemotherapy regimen:   1. Doxorubicin 60 mg/m² IV day 1;   2. Cyclophosphamide 600 mg/m² IV day 1;   Cycled every 14 days for 4 cycles; followed by,   3.  Paclitaxel 175 mg/m² by 3 hour IV infusion on day 1, cycled every 14 days for 4 cycles; or, paclitaxel 80 mg/m² IV 1 hour infusion weekly for 12 weeks.   All dose-dense cycles are with myeloid growth factor support.    After neoadjuvant chemotherapy, will need BCS or mastectomy and surgical axillary staging.     -Adjuvant RT will depend upon lumpectomy versus mastectomy status, as well as surgical pathology   (in this case, since the right axillary lymph node is biopsy positive, therefore,  will need adjuvant radiotherapy whether she undergoes lumpectomy or mastectomy)    Adjuvant systemic therapy after preoperative systemic therapy:   1.  Consider adjuvant bisphosphonate therapy for risk reduction of distant metastasis for 3-5 years in postmenopausal patients with high risk node-negative or node positive tumors   >> Will offer her zoledronic acid 4 mg IV every 6 months x3-5 years in the adjuvant setting;   2.  Adjuvant endocrine therapy appropriate for ER positive postmenopausal patient;   3.  Adjuvant olaparib if germline BRCA1/2 mutation positive and residual disease after preoperative therapy and a clinical stage, pathologic stage, estrogen receptor status, and tumor grade (CPS+EG) = 3 or >3 (tumor is ER positive, HER-2 negative)   (If patient qualifies for olaparib, then, olaparib can be used concurrently with endocrine therapy but only after adjuvant radiotherapy)   4.  This patient has at least 1 axillary lymph node positive; has grade 3 disease; Ki-67 score is 50%; therefore, 2 years of adjuvant abemaciclib can be considered in combination with endocrine therapy    Normal BMD on baseline DEXA scan.     Above discussed at length with the patient.  All questions answered.    Discussed labs, scans, pathology report, significance of breast prognostic panel, and breast cancer staging, and gave her copies of relevant reports.   Discussed plan of management in detail.   She understands and agrees this plan.

## 2022-06-19 ENCOUNTER — TELEPHONE (OUTPATIENT)
Dept: HEMATOLOGY/ONCOLOGY | Facility: CLINIC | Age: 67
End: 2022-06-19
Payer: MEDICAID

## 2022-06-19 NOTE — TELEPHONE ENCOUNTER
Need the report of Restaging bilateral contrast and has diagnostic mammogram and limited ultrasound right breast, performed June 17.

## 2022-06-19 NOTE — PROGRESS NOTES
Past medical history: Obesity.  Tobacco abuse.   -02/19/2020: Acute nonocclusive DVT in the right distal femoral vein; acute occlusive DVTs right popliteal and right peroneal veins ((she never followed up on that)  Procedure/surgical history: Cholecystectomy (2001).  Bilateral tubal ligation (according to her).  Social history: .  Lives in [place], [place].  Has 2 children.  Children.  Does not work.  Smoked half a pack of cigarettes daily for 40 years; quit 2 weeks ago.  No alcohol or illicit drugs.  Family history: No family history of cancer.  Health maintenance: According to her, screening colonoscopy 5 years ago at [facility], probably revealing a benign polyp.  Menstrual and OB/GYN history: Menarche, age 13.  Menopause, age 59.  G2, P2.  No abortions or miscarriages.  Age at delivery of first child, 22.  Did not breast-feed her children.  Used birth control pills for 1 year, subsequently, bilateral tubal ligation.  No history of hormone replacement therapy.      Reason for follow-up:   -IDC right breast, presumed triple negative, AJCC stage IIB, clinical prognostic stage IIIB   -History of right lower extremity DVT      History of present illness:   66-year-old lady referred from surgery, with breast cancer.     She initially presented to emergency department 11/15/2021 for evaluation of breast pain and a palpable lump in the right breast.  Subsequent imaging studies and biopsy established diagnosis of invasive ductal carcinoma of right breast, axillary lymph node positive, ER low positive, SD negative, and HER-2 negative.     Physical examination by surgery on 01/27/2022 makes note of 3 cm x 3 cm right breast periareolar mass with overlying erythema and nipple retraction; no nipple discharge.  Left breast without masses, skin changes, dimpling, nipple retraction or discharge.  Right axilla with biopsy scar overlying small palpable mass.     12/21/2021: Diagnostic bilateral mammogram, limited ultrasound  of both breasts (comparison: 11/15/2021):   -Overall study BI-RADS 4: Suspicious of malignancy     -01/07/2022: Bilateral diagnostic mammogram with contrast and limited ultrasound right breast:   -Right breast: Highly suggestive of malignancy (BI-RADS 5)   -Left breast: Negative (BI-RADS 1)     01/07/2022: Ultrasound-guided core biopsy right breast:   1.  Right axillary 3.5 x 0.9 x 1.4 cm lymph node with 7 mm cortex at 11:00, 13 cm from nipple, needle biopsy: Carcinoma, consistent with a breast primary   2.  Right upper central breast 8 x 4 x 8 mm mass at 1:00, 5 cm from nipple, ultrasound-guided needle biopsy:: Complex sclerosing lesion/sclerosing adenosis   3.  Right retroareolar breast 3 x 3.1 x 2.1 cm mass, ultrasound-guided needle biopsy: Invasive ductal carcinoma; overall grade 3     ER low positive (1%).  NE negative (0%).  HER-2 equivocal (score 2+); HER-2 negative/not amplified (by FISH) Ki-67 high proliferation (50%).      02/10/2022:   Presents for initial medical oncology consultation, accompanied by her .  Pleasant lady.  In no acute discomfort.   Occasional, mild right breast pain.  No skin changes or nipple discharge.   Good appetite.  No weakness or fatigue.  No new lumps or lymphadenopathy.  No abdominal pain, nausea, vomiting, GI bleeding, unusual headaches, focal neurological symptoms, chest pain, dyspnea, hemoptysis, etc.  ECOG 0-1.      Interval history:     03/29/2022:   -02/22/2022: DEXA scan: Normal BMD   -02/22/2022: CT C/A/P with contrast for staging: Right breast mass with right axillary lymphadenopathy; bilateral adrenal nodules, statistically representing adenomas   -02/22/2022: Whole-body nuclear medicine bone scan: No bone metastasis   -03/02/2022: Mediport placed   -03/11/2022: TTE: LVEF 55-60%   Presents for a follow-up visit.  Complains of pain in the right nipple and perianal area secondary to cancer.  No other complaints.  Good appetite.  Good energy.  No unusual headaches,  focal neurological symptoms, weakness, fatigue, anorexia, chest pain, cough, dyspnea, etc.  We discussed all labs and scans in detail.    06/21/2022:  -neoadjuvant DD AC started 04/13/2022; severe neutropenia, ANC 0.03, on 04/26/2022; therefore, cycle 2 held; treated with growth factor and prophylactic ciprofloxacin  -cycle 2 on 05/05/2022; cycle 3 on 05/19/2022; cycle 4 on 06/02/2022  -no showed 06/15/2022  -06/17/2022:  Bilateral diagnostic mammogram with contrast, limited ultrasound right breast pelvic comparison:  01/07/2022):  IMPRESSION: KNOWN BIOPSY PROVEN MALIGNANCY  Right Breast: Known Biopsy-Proven Malignancy (BI-RADS 6). Recommend surgical excision when clinically appropriate.  Left Breast: Benign (BI-RADS 2)   Recommendations:  1. Right retroareolar breast heterogeneously enhancing 3.8 (AP) x 3.5 (TR) x 3 (CC) cm (3.2 x 3.1 x 3.6 cm sonographically) spiculated mass which is biopsy proven IDC.  There has been progressive skin thickening and skin and nipple retraction with concern for nipple invasion.  It previously measured 3.5 (AP) x 2.6 (TR) x 2.8 (CC) cm (3 x 3.1 x 2.1 cm sonographically) on 01/07/2022.   2. Right axillary 3.6 x 1.3 x 1.3 cm lymph node at the 11:00 position, 13 cm from nipple, which is biopsy proven metastatic carcinoma.  It measured approximately 3.5 x 0.9 x 1.4 cm on 12/21/2021  -06/21/2022:  Labs reviewed; hemoglobin 9.2, dropping since 05/04/2022;; creatinine 1.15, slightly elevated and stable;; ANC 5.6  Presents for a follow-up visit.  Not doing good.  For last couple of weeks, feeling sick, weak, and unable to walk.  Says that she is able to walk very little.  Denies bowel or urinary incontinence.  Denies back pain.  Denies unusual headaches or focal neurological symptoms.  No appetite.  Will send to malignancy department ASAP for imaging studies to rule out metastatic disease/cord compression.      Review of systems   All systems reviewed, and found to be negative except for the  symptoms detailed above.      Physical examination:   VITAL SIGNS:  Reviewed.      GENERAL:  In no apparent distress.   HEAD:  No signs of head trauma.   EYES:  Pupils are equal.  Extraocular motions intact.   EARS:  Hearing grossly intact.   MOUTH:  Oropharynx is normal.   NECK:  No adenopathy, no JVD.      CHEST:  Chest with clear breath sounds bilaterally.  No wheezes, rales, or rhonchi.   CARDIAC:  Regular rate and rhythm.  S1 and S2, without murmurs, gallops, or rubs.   VASCULAR:  No Edema.  Peripheral pulses normal and equal in all extremities.   ABDOMEN:  Soft, without detectable tenderness.  No sign of distention.  No   rebound or guarding, and no masses palpated.   Bowel Sounds normal.   MUSCULOSKELETAL:  Good range of motion of all major joints. Extremities without clubbing, cyanosis or edema.   NEUROLOGIC EXAM:  Alert and oriented x 3.  No focal sensory or strength deficits.   Speech normal.  Follows commands.   PSYCHIATRIC:  Mood normal.   SKIN:  No rash or lesions.      Assessment:   #IDC right breast, presumed triple negative:   -History of right lower extremity DVT and right breast mass 02/2020; she did not follow-up   -Presentation: 11/2021: Palpable mass   -01/07/2022: Bilateral diagnostic mammogram with contrast and limited ultrasound right breast   -01/07/2022: Biopsy   -3.1 cm mass on mammogram, overall grade 3, right axillary lymph node biopsy positive   -ER low positive (1%).  AK negative (0%).  HER-2 negative by FISH.  Ki-67 high proliferation (50%)   >>>   For the purpose of neoadjuvant chemotherapy, since ER iis low positive, we will treat the patient as if triple negative   >>>  -cT2 cN1 MX, grade 3, presumed triple negative, AJCC anatomic stage IIB, clinical prognostic stage IIIB   -02/22/2022: DEXA scan: Normal BMD   -02/22/2022: CT C/A/P with contrast for staging: Right breast mass with right axillary lymphadenopathy; bilateral adrenal nodules, statistically representing adenomas    -02/22/2022: Whole-body nuclear medicine bone scan: No bone metastasis   -03/02/2022: Mediport placed   -03/11/2022: TTE: LVEF 55-60%  -neoadjuvant DD AC started 04/13/2022; severe neutropenia, ANC 0.03, on 04/26/2022; therefore, cycle 2 held; treated with growth factor and prophylactic ciprofloxacin  -cycle 2 on 05/05/2022; cycle 3 on 05/19/2022; cycle 4 on 06/02/2022   -some progression on restaging mammogram and ultrasound 06/17/2022      #History of right lower extremity DVT 02/19/2020:   -02/19/2020: (Right leg swelling for 3 days) acute nonocclusive DVT in the right distal femoral vein; acute occlusive DVTs right popliteal and right peroneal veins   -Apparently, was also noted to have a breast lump (right breast, 3:00, a small lump, nontender, slightly irregular on palpation, not hard or fluctuant, no erythema)   -Apparently, [facility] ambulatory clinics tried to contact her but without any response      Plan:   -Despite the fact that ER is low positive (1%), regardless, in due course, will need adjuvant endocrine therapy   -Since ER positive (low positive), HER-2 negative, at least 1 axillary lymph node positive, grade 3 disease, and Ki-67 score >20%, therefore, in due course, will benefit from 2 years of adjuvant abemaciclib in combination with endocrine therapy   -Since this tumor may behave more like triple negative breast cancer, therefore, we have ordered genetic testing and counseling   -s/p neoadjuvant DD AC x4 (04/30/2022-06/02/2022)  >>>  -some progression on restaging mammogram and ultrasound 06/17/2022 post neoadjuvant DDAC x4  >>>  -proceed with neoadjuvant Taxol (weekly, 80 mg per m2 IV)    Chemotherapy regimen:   1. Doxorubicin 60 mg/m² IV day 1;   2. Cyclophosphamide 600 mg/m² IV day 1;   Cycled every 14 days for 4 cycles; followed by,   3.  Paclitaxel 175 mg/m² by 3 hour IV infusion on day 1, cycled every 14 days for 4 cycles; or, paclitaxel 80 mg/m² IV 1 hour infusion weekly for 12 weeks.   All  dose-dense cycles are with myeloid growth factor support.    After neoadjuvant chemotherapy, will need BCS or mastectomy and surgical axillary staging.     -Adjuvant RT will depend upon lumpectomy versus mastectomy status, as well as surgical pathology   (in this case, since the right axillary lymph node is biopsy positive, therefore,  will need adjuvant radiotherapy whether she undergoes lumpectomy or mastectomy)    Adjuvant systemic therapy after preoperative systemic therapy:   1.  Consider adjuvant bisphosphonate therapy for risk reduction of distant metastasis for 3-5 years in postmenopausal patients with high risk node-negative or node positive tumors   >> Will offer her zoledronic acid 4 mg IV every 6 months x3-5 years in the adjuvant setting;   2.  Adjuvant endocrine therapy appropriate for ER positive postmenopausal patient;   3.  Adjuvant olaparib if germline BRCA1/2 mutation positive and residual disease after preoperative therapy and a clinical stage, pathologic stage, estrogen receptor status, and tumor grade (CPS+EG) = 3 or >3 (tumor is ER positive, HER-2 negative)   (If patient qualifies for olaparib, then, olaparib can be used concurrently with endocrine therapy but only after adjuvant radiotherapy)   4.  This patient has at least 1 axillary lymph node positive; has grade 3 disease; Ki-67 score is 50%; therefore, 2 years of adjuvant abemaciclib can be considered in combination with endocrine therapy    Normal BMD on baseline DEXA scan.    06/21/2022:  Generalized weakness.  Feeling sick.  Unable to walk for last 2-3 weeks.  No headaches, back pain, or incontinence  Will send to malignancy department immediately for imaging studies to rule out metastatic disease  Needs contrast enhanced CT scans of C/A/P, brain MRI with and without contrast, and MRI scan of spine with and without contrast.  Hold initiation of neoadjuvant Taxol at this time    Follow-up in 2 weeks.     Above discussed at length with  the patient.  All questions answered.   Discussed labs and scans and gave her copies of relevant records.  She understands and agrees this plan.

## 2022-06-21 ENCOUNTER — TELEPHONE (OUTPATIENT)
Dept: HEMATOLOGY/ONCOLOGY | Facility: CLINIC | Age: 67
End: 2022-06-21
Payer: MEDICAID

## 2022-06-21 ENCOUNTER — HOSPITAL ENCOUNTER (EMERGENCY)
Facility: HOSPITAL | Age: 67
Discharge: HOME | End: 2022-06-21
Attending: INTERNAL MEDICINE
Payer: MEDICAID

## 2022-06-21 ENCOUNTER — OFFICE VISIT (OUTPATIENT)
Dept: HEMATOLOGY/ONCOLOGY | Facility: CLINIC | Age: 67
End: 2022-06-21
Payer: MEDICAID

## 2022-06-21 VITALS
HEIGHT: 64 IN | BODY MASS INDEX: 24.75 KG/M2 | RESPIRATION RATE: 20 BRPM | HEART RATE: 119 BPM | OXYGEN SATURATION: 100 % | TEMPERATURE: 99 F | WEIGHT: 145 LBS

## 2022-06-21 VITALS
BODY MASS INDEX: 25.27 KG/M2 | WEIGHT: 148 LBS | RESPIRATION RATE: 16 BRPM | OXYGEN SATURATION: 100 % | HEIGHT: 64 IN | SYSTOLIC BLOOD PRESSURE: 103 MMHG | HEART RATE: 92 BPM | DIASTOLIC BLOOD PRESSURE: 66 MMHG | TEMPERATURE: 97 F

## 2022-06-21 DIAGNOSIS — C50.919 MALIGNANT NEOPLASM OF BREAST IN FEMALE, ESTROGEN RECEPTOR NEGATIVE, UNSPECIFIED LATERALITY, UNSPECIFIED SITE OF BREAST: Primary | ICD-10-CM

## 2022-06-21 DIAGNOSIS — R63.0 ANOREXIA: ICD-10-CM

## 2022-06-21 DIAGNOSIS — Z17.1 MALIGNANT NEOPLASM OF BREAST IN FEMALE, ESTROGEN RECEPTOR NEGATIVE, UNSPECIFIED LATERALITY, UNSPECIFIED SITE OF BREAST: Primary | ICD-10-CM

## 2022-06-21 DIAGNOSIS — Z17.1 MALIGNANT NEOPLASM OF RIGHT BREAST IN FEMALE, ESTROGEN RECEPTOR NEGATIVE, UNSPECIFIED SITE OF BREAST: Primary | ICD-10-CM

## 2022-06-21 DIAGNOSIS — R53.1 GENERALIZED WEAKNESS: ICD-10-CM

## 2022-06-21 DIAGNOSIS — R53.1 WEAKNESS: ICD-10-CM

## 2022-06-21 DIAGNOSIS — C50.911 MALIGNANT NEOPLASM OF RIGHT BREAST IN FEMALE, ESTROGEN RECEPTOR NEGATIVE, UNSPECIFIED SITE OF BREAST: Primary | ICD-10-CM

## 2022-06-21 DIAGNOSIS — R29.898 WEAKNESS OF LOWER EXTREMITY, UNSPECIFIED LATERALITY: ICD-10-CM

## 2022-06-21 DIAGNOSIS — Z86.718 HISTORY OF DEEP VENOUS THROMBOSIS (DVT) OF DISTAL VEIN OF RIGHT LOWER EXTREMITY: ICD-10-CM

## 2022-06-21 DIAGNOSIS — R06.02 SHORTNESS OF BREATH: ICD-10-CM

## 2022-06-21 LAB
BNP BLD-MCNC: 21.5 PG/ML
MAGNESIUM SERPL-MCNC: 2.2 MG/DL (ref 1.6–2.6)
PHOSPHATE SERPL-MCNC: 2.2 MG/DL (ref 2.3–4.7)
TSH SERPL-ACNC: 2.22 UIU/ML (ref 0.35–4.94)

## 2022-06-21 PROCEDURE — 99214 OFFICE O/P EST MOD 30 MIN: CPT | Mod: S$PBB,,, | Performed by: INTERNAL MEDICINE

## 2022-06-21 PROCEDURE — 99214 PR OFFICE/OUTPT VISIT, EST, LEVL IV, 30-39 MIN: ICD-10-PCS | Mod: S$PBB,,, | Performed by: INTERNAL MEDICINE

## 2022-06-21 PROCEDURE — 1160F PR REVIEW ALL MEDS BY PRESCRIBER/CLIN PHARMACIST DOCUMENTED: ICD-10-PCS | Mod: CPTII,,, | Performed by: INTERNAL MEDICINE

## 2022-06-21 PROCEDURE — 99213 OFFICE O/P EST LOW 20 MIN: CPT | Mod: PBBFAC,25 | Performed by: INTERNAL MEDICINE

## 2022-06-21 PROCEDURE — 1160F RVW MEDS BY RX/DR IN RCRD: CPT | Mod: CPTII,,, | Performed by: INTERNAL MEDICINE

## 2022-06-21 PROCEDURE — 1159F MED LIST DOCD IN RCRD: CPT | Mod: CPTII,,, | Performed by: INTERNAL MEDICINE

## 2022-06-21 PROCEDURE — 3008F BODY MASS INDEX DOCD: CPT | Mod: CPTII,,, | Performed by: INTERNAL MEDICINE

## 2022-06-21 PROCEDURE — 84443 ASSAY THYROID STIM HORMONE: CPT | Performed by: INTERNAL MEDICINE

## 2022-06-21 PROCEDURE — 83880 ASSAY OF NATRIURETIC PEPTIDE: CPT | Performed by: INTERNAL MEDICINE

## 2022-06-21 PROCEDURE — 84100 ASSAY OF PHOSPHORUS: CPT | Performed by: INTERNAL MEDICINE

## 2022-06-21 PROCEDURE — 83735 ASSAY OF MAGNESIUM: CPT | Performed by: INTERNAL MEDICINE

## 2022-06-21 PROCEDURE — 3008F PR BODY MASS INDEX (BMI) DOCUMENTED: ICD-10-PCS | Mod: CPTII,,, | Performed by: INTERNAL MEDICINE

## 2022-06-21 PROCEDURE — 99285 EMERGENCY DEPT VISIT HI MDM: CPT | Mod: 25,27

## 2022-06-21 PROCEDURE — 1159F PR MEDICATION LIST DOCUMENTED IN MEDICAL RECORD: ICD-10-PCS | Mod: CPTII,,, | Performed by: INTERNAL MEDICINE

## 2022-06-21 PROCEDURE — 36415 COLL VENOUS BLD VENIPUNCTURE: CPT | Performed by: INTERNAL MEDICINE

## 2022-06-21 PROCEDURE — 93005 ELECTROCARDIOGRAM TRACING: CPT

## 2022-06-21 NOTE — TELEPHONE ENCOUNTER
Extremely weak.  Sick.  Unable to walk for 2 weeks.    Please send to emergency department ASAP:    -needs imaging studies to rule out metastatic disease  -contrast enhanced CT scans of C/A/P, brain MRI with and without contrast, MRI scan of C/T/L-spine with and without contrast

## 2022-06-21 NOTE — ED PROVIDER NOTES
Encounter Date: 2022       History     Chief Complaint   Patient presents with    Fatigue     PT SENT FROM ONCOL. CLINIC  W CO WEAKNESS W SOB UPON EXERTION X 2 WKS.  LAST CHEMO X 1 WK.  NO FEVER REPORTED.  EKG OBTAINED.       Presents from Oncology Clinic due to weakness. Pt states having weakness while walking, denies pain. States when she walks get weak and need to sit in her wheelchair. No cough, hemoptysis, fever, sick contacts, vomiting, diarrhea or decrease in urination.  Labs from today reveal macrocytic anemia at 9.2 mg/dL (stable as compared to prior) otherwise unremarkable including normal Alk phos and calcium.    The history is provided by the patient and a relative.     Review of patient's allergies indicates:  No Known Allergies  Past Medical History:   Diagnosis Date    Breast cancer      Past Surgical History:   Procedure Laterality Date    BREAST BIOPSY      CHOLECYSTECTOMY       No family history on file.  Social History     Tobacco Use    Smoking status: Former Smoker     Types: Cigarettes     Quit date: 3/5/2022     Years since quittin.2    Smokeless tobacco: Never Used   Substance Use Topics    Alcohol use: Not Currently    Drug use: Never     Review of Systems   Constitutional: Positive for fatigue. Negative for fever.   HENT: Negative for sore throat.    Respiratory: Negative for shortness of breath.    Cardiovascular: Negative for chest pain.   Gastrointestinal: Negative for nausea.   Genitourinary: Negative for dysuria.   Musculoskeletal: Negative for back pain.   Skin: Negative for rash.   Neurological: Positive for weakness.   Hematological: Does not bruise/bleed easily.   All other systems reviewed and are negative.      Physical Exam     Initial Vitals [22 1020]   BP Pulse Resp Temp SpO2   (!) 179/85 (!) 111 16 97.2 °F (36.2 °C) 100 %      MAP       --         Physical Exam    Nursing note and vitals reviewed.  Constitutional: She appears well-developed. No  distress.   HENT:   Head: Normocephalic and atraumatic.   Mouth/Throat: Oropharynx is clear and moist.   Eyes: Conjunctivae and EOM are normal. Pupils are equal, round, and reactive to light.   Neck: Neck supple.   Normal range of motion.  Cardiovascular: Normal rate, regular rhythm, normal heart sounds and intact distal pulses.   Pulmonary/Chest: Breath sounds normal.   Abdominal: Abdomen is soft. Bowel sounds are normal. She exhibits no distension. There is no abdominal tenderness. There is no rebound and no guarding.   Musculoskeletal:         General: No edema. Normal range of motion.      Cervical back: Normal range of motion and neck supple.     Neurological: She is alert and oriented to person, place, and time. She has normal strength.   Skin: Skin is warm and dry. No rash noted. There is pallor.   Psychiatric: Her behavior is normal.         ED Course   Procedures  Labs Reviewed   PHOSPHORUS - Abnormal; Notable for the following components:       Result Value    Phosphorus Level 2.2 (*)     All other components within normal limits   B-TYPE NATRIURETIC PEPTIDE - Normal   TSH - Normal   MAGNESIUM - Normal     EKG Readings: (Independently Interpreted)   Initial Reading: No STEMI. Rhythm: Sinus Tachycardia. Heart Rate: 108. Ectopy: No Ectopy. Conduction: Normal. ST Segments: Normal ST Segments. T Waves: Normal. Axis: Normal. Clinical Impression: Normal Sinus Rhythm     ECG Results          EKG 12-lead (In process)  Result time 06/21/22 10:29:27    In process by Interface, Lab In [facility] (06/21/22 10:29:27)                 Narrative:    Test Reason : R06.02,    Vent. Rate : 109 BPM     Atrial Rate : 109 BPM     P-R Int : 138 ms          QRS Dur : 066 ms      QT Int : 342 ms       P-R-T Axes : 070 074 091 degrees     QTc Int : 460 ms    Sinus tachycardia  Otherwise normal ECG  No previous ECGs available    Referred By: [email or web address]   SELF           Confirmed By:                             Imaging Results           NM Lung Scan Ventilation Perfusion (Final result)  Result time 06/21/22 13:18:54    Final result by Rahel Quinones MD (06/21/22 13:18:54)                 Impression:      This represents a normal/very low probability  of pulmonary embolism.      Electronically signed by: Rahel Quinones  Date:    06/21/2022  Time:    13:18             Narrative:    EXAMINATION:  NM LUNG VENTILATION AND PERFUSION IMAGING    CLINICAL HISTORY:  Pulmonary embolism (PE) suspected, high prob;    TECHNIQUE:  33.7 mCi of Tc-99m-DTPA were placed in the nebulizer. Following the inhalation Tc-99m-DTPA in aerosol and the subsequent IV administration of 4.1 mCi of Tc-99m-MAA, multiple images of the thorax were obtained in various projections.    COMPARISON:  Chest radiograph 06/21/2022    FINDINGS:  Perfusion: No appreciable perfusion defects..    Ventilation: Relatively homogeneous distribution of radiotracer without focal defect.    CXR: No lobar consolidation..                               X-Ray Chest PA And Lateral (Final result)  Result time 06/21/22 11:20:54    Final result by Pete Israel MD (06/21/22 11:20:54)                 Impression:      No acute cardiopulmonary process identified.      Electronically signed by: Pete Israel  Date:    06/21/2022  Time:    11:20             Narrative:    EXAMINATION:  XR CHEST PA AND LATERAL    CLINICAL HISTORY:  Shortness of breath    TECHNIQUE:  Two views    COMPARISON:  March 2, 2022.    FINDINGS:  Cardiopericardial silhouette is within normal limits.  Left chest implanted tunnel rachel catheter terminates within the distal superior vena cava.  No acute dense focal or segmental consolidation, congestion, pleural effusion or pneumothorax.                                 Medications - No data to display                       Clinical Impression:   Final diagnoses:  [R06.02] Shortness of breath  [R53.1] Weakness  [C50.919, Z17.1] Malignant neoplasm of breast in female, estrogen receptor  negative, unspecified laterality, unspecified site of breast (Primary)          ED Disposition Condition    Discharge Stable        ED Prescriptions     None        Follow-up Information     Follow up With Specialties Details Why Contact Info    [facility] Dept Emergency Medicine  If symptoms worsen [address] [address] [phone]    [facility]    [address] [address]           Abner Palencia MD  06/21/22 [id number]

## 2022-06-22 ENCOUNTER — INFUSION (OUTPATIENT)
Dept: INFUSION THERAPY | Facility: HOSPITAL | Age: 67
End: 2022-06-22
Attending: INTERNAL MEDICINE
Payer: MEDICAID

## 2022-06-22 DIAGNOSIS — C50.911 MALIGNANT NEOPLASM OF RIGHT BREAST IN FEMALE, ESTROGEN RECEPTOR NEGATIVE, UNSPECIFIED SITE OF BREAST: ICD-10-CM

## 2022-06-22 DIAGNOSIS — Z17.1 MALIGNANT NEOPLASM OF RIGHT BREAST IN FEMALE, ESTROGEN RECEPTOR NEGATIVE, UNSPECIFIED SITE OF BREAST: ICD-10-CM

## 2022-06-22 DIAGNOSIS — Z86.718 HISTORY OF DEEP VENOUS THROMBOSIS (DVT) OF DISTAL VEIN OF RIGHT LOWER EXTREMITY: Primary | ICD-10-CM

## 2022-06-22 NOTE — NURSING
14:35 Patient is here for scheduled appointment to received Fulphila, but she did not received chemo yesterday therefore she does not need Fulphila  injection today. Provided Patient with next appointments to return in 2 wks for labs and Provider visit.  She verbalized understanding.

## 2022-07-06 ENCOUNTER — OFFICE VISIT (OUTPATIENT)
Dept: HEMATOLOGY/ONCOLOGY | Facility: CLINIC | Age: 67
End: 2022-07-06
Payer: MEDICAID

## 2022-07-06 VITALS
BODY MASS INDEX: 24.82 KG/M2 | OXYGEN SATURATION: 100 % | WEIGHT: 145.38 LBS | DIASTOLIC BLOOD PRESSURE: 68 MMHG | TEMPERATURE: 98 F | RESPIRATION RATE: 18 BRPM | HEART RATE: 90 BPM | SYSTOLIC BLOOD PRESSURE: 104 MMHG | HEIGHT: 64 IN

## 2022-07-06 DIAGNOSIS — Z86.718 HISTORY OF DEEP VENOUS THROMBOSIS (DVT) OF DISTAL VEIN OF RIGHT LOWER EXTREMITY: ICD-10-CM

## 2022-07-06 DIAGNOSIS — C50.011 MALIGNANT NEOPLASM OF NIPPLE OF RIGHT BREAST IN FEMALE, ESTROGEN RECEPTOR POSITIVE: Primary | ICD-10-CM

## 2022-07-06 DIAGNOSIS — R53.83 OTHER FATIGUE: ICD-10-CM

## 2022-07-06 DIAGNOSIS — R53.1 GENERALIZED WEAKNESS: ICD-10-CM

## 2022-07-06 DIAGNOSIS — Z17.0 MALIGNANT NEOPLASM OF NIPPLE OF RIGHT BREAST IN FEMALE, ESTROGEN RECEPTOR POSITIVE: Primary | ICD-10-CM

## 2022-07-06 PROCEDURE — 3078F PR MOST RECENT DIASTOLIC BLOOD PRESSURE < 80 MM HG: ICD-10-PCS | Mod: CPTII,,, | Performed by: NURSE PRACTITIONER

## 2022-07-06 PROCEDURE — 3008F PR BODY MASS INDEX (BMI) DOCUMENTED: ICD-10-PCS | Mod: CPTII,,, | Performed by: NURSE PRACTITIONER

## 2022-07-06 PROCEDURE — 1159F MED LIST DOCD IN RCRD: CPT | Mod: CPTII,,, | Performed by: NURSE PRACTITIONER

## 2022-07-06 PROCEDURE — 1159F PR MEDICATION LIST DOCUMENTED IN MEDICAL RECORD: ICD-10-PCS | Mod: CPTII,,, | Performed by: NURSE PRACTITIONER

## 2022-07-06 PROCEDURE — 99213 OFFICE O/P EST LOW 20 MIN: CPT | Mod: PBBFAC | Performed by: NURSE PRACTITIONER

## 2022-07-06 PROCEDURE — 99214 OFFICE O/P EST MOD 30 MIN: CPT | Mod: S$PBB,,, | Performed by: NURSE PRACTITIONER

## 2022-07-06 PROCEDURE — 3008F BODY MASS INDEX DOCD: CPT | Mod: CPTII,,, | Performed by: NURSE PRACTITIONER

## 2022-07-06 PROCEDURE — 99214 PR OFFICE/OUTPT VISIT, EST, LEVL IV, 30-39 MIN: ICD-10-PCS | Mod: S$PBB,,, | Performed by: NURSE PRACTITIONER

## 2022-07-06 PROCEDURE — 3074F SYST BP LT 130 MM HG: CPT | Mod: CPTII,,, | Performed by: NURSE PRACTITIONER

## 2022-07-06 PROCEDURE — 3074F PR MOST RECENT SYSTOLIC BLOOD PRESSURE < 130 MM HG: ICD-10-PCS | Mod: CPTII,,, | Performed by: NURSE PRACTITIONER

## 2022-07-06 PROCEDURE — 3078F DIAST BP <80 MM HG: CPT | Mod: CPTII,,, | Performed by: NURSE PRACTITIONER

## 2022-07-06 NOTE — PROGRESS NOTES
Past medical history: Obesity.  Tobacco abuse.   -02/19/2020: Acute nonocclusive DVT in the right distal femoral vein; acute occlusive DVTs right popliteal and right peroneal veins ((she never followed up on that)  Procedure/surgical history: Cholecystectomy (2001).  Bilateral tubal ligation (according to her).  Social history: .  Lives in [place], [place].  Has 2 children.  Children.  Does not work.  Smoked half a pack of cigarettes daily for 40 years; quit 2 weeks ago.  No alcohol or illicit drugs.  Family history: No family history of cancer.  Health maintenance: According to her, screening colonoscopy 5 years ago at [facility], probably revealing a benign polyp.  Menstrual and OB/GYN history: Menarche, age 13.  Menopause, age 59.  G2, P2.  No abortions or miscarriages.  Age at delivery of first child, 22.  Did not breast-feed her children.  Used birth control pills for 1 year, subsequently, bilateral tubal ligation.  No history of hormone replacement therapy.    Reason for follow-up:   -IDC right breast, presumed triple negative, AJCC stage IIB, clinical prognostic stage IIIB   -History of right lower extremity DVT    History of present illness:  66-year-old lady referred from surgery, with breast cancer.     She initially presented to emergency department 11/15/2021 for evaluation of breast pain and a palpable lump in the right breast.  Subsequent imaging studies and biopsy established diagnosis of invasive ductal carcinoma of right breast, axillary lymph node positive, ER low positive, DE negative, and HER-2 negative.     Physical examination by surgery on 01/27/2022 makes note of 3 cm x 3 cm right breast periareolar mass with overlying erythema and nipple retraction; no nipple discharge.  Left breast without masses, skin changes, dimpling, nipple retraction or discharge.  Right axilla with biopsy scar overlying small palpable mass.     12/21/2021: Diagnostic bilateral mammogram, limited ultrasound of  both breasts (comparison: 11/15/2021):   -Overall study BI-RADS 4: Suspicious of malignancy     -01/07/2022: Bilateral diagnostic mammogram with contrast and limited ultrasound right breast:   -Right breast: Highly suggestive of malignancy (BI-RADS 5)   -Left breast: Negative (BI-RADS 1)     01/07/2022: Ultrasound-guided core biopsy right breast:   1.  Right axillary 3.5 x 0.9 x 1.4 cm lymph node with 7 mm cortex at 11:00, 13 cm from nipple, needle biopsy: Carcinoma, consistent with a breast primary   2.  Right upper central breast 8 x 4 x 8 mm mass at 1:00, 5 cm from nipple, ultrasound-guided needle biopsy:: Complex sclerosing lesion/sclerosing adenosis   3.  Right retroareolar breast 3 x 3.1 x 2.1 cm mass, ultrasound-guided needle biopsy: Invasive ductal carcinoma; overall grade 3     ER low positive (1%).  NJ negative (0%).  HER-2 equivocal (score 2+); HER-2 negative/not amplified (by FISH) Ki-67 high proliferation (50%).    02/10/2022:   Presents for initial medical oncology consultation, accompanied by her .  Pleasant lady.  In no acute discomfort.   Occasional, mild right breast pain.  No skin changes or nipple discharge.   Good appetite.  No weakness or fatigue.  No new lumps or lymphadenopathy.  No abdominal pain, nausea, vomiting, GI bleeding, unusual headaches, focal neurological symptoms, chest pain, dyspnea, hemoptysis, etc.  ECOG 0-1.    Interval history:     03/29/2022:   -02/22/2022: DEXA scan: Normal BMD   -02/22/2022: CT C/A/P with contrast for staging: Right breast mass with right axillary lymphadenopathy; bilateral adrenal nodules, statistically representing adenomas   -02/22/2022: Whole-body nuclear medicine bone scan: No bone metastasis   -03/02/2022: Mediport placed   -03/11/2022: TTE: LVEF 55-60%   Presents for a follow-up visit.  Complains of pain in the right nipple and perianal area secondary to cancer.  No other complaints.  Good appetite.  Good energy.  No unusual headaches, focal  neurological symptoms, weakness, fatigue, anorexia, chest pain, cough, dyspnea, etc.  We discussed all labs and scans in detail.    06/21/2022:  -neoadjuvant DD AC started 04/13/2022; severe neutropenia, ANC 0.03, on 04/26/2022; therefore, cycle 2 held; treated with growth factor and prophylactic ciprofloxacin  -cycle 2 on 05/05/2022; cycle 3 on 05/19/2022; cycle 4 on 06/02/2022  -no showed 06/15/2022  -06/17/2022:  Bilateral diagnostic mammogram with contrast, limited ultrasound right breast pelvic comparison:  01/07/2022):  IMPRESSION: KNOWN BIOPSY PROVEN MALIGNANCY  Right Breast: Known Biopsy-Proven Malignancy (BI-RADS 6). Recommend surgical excision when clinically appropriate.  Left Breast: Benign (BI-RADS 2)   Recommendations:  1. Right retroareolar breast heterogeneously enhancing 3.8 (AP) x 3.5 (TR) x 3 (CC) cm (3.2 x 3.1 x 3.6 cm sonographically) spiculated mass which is biopsy proven IDC.  There has been progressive skin thickening and skin and nipple retraction with concern for nipple invasion.  It previously measured 3.5 (AP) x 2.6 (TR) x 2.8 (CC) cm (3 x 3.1 x 2.1 cm sonographically) on 01/07/2022.   2. Right axillary 3.6 x 1.3 x 1.3 cm lymph node at the 11:00 position, 13 cm from nipple, which is biopsy proven metastatic carcinoma.  It measured approximately 3.5 x 0.9 x 1.4 cm on 12/21/2021  -06/21/2022:  Labs reviewed; hemoglobin 9.2, dropping since 05/04/2022;; creatinine 1.15, slightly elevated and stable;; ANC 5.6  Presents for a follow-up visit.  Not doing good.  For last couple of weeks, feeling sick, weak, and unable to walk.  Says that she is able to walk very little.  Denies bowel or urinary incontinence.  Denies back pain.  Denies unusual headaches or focal neurological symptoms.  No appetite.  Will send to malignancy department ASAP for imaging studies to rule out metastatic disease/cord compression.    07/06/2022:  Seen in follow up today; at her last visit she was sent from clinic to our  emergency department for generalized weakness and SOB; she had NSR on EKG; ultimately discharged to home same day. Today, she presents in WC; noted fatigue and generalized weakness; improved some today in terms of how she's feeling. No CP, palpitations. Dyspnea on exertion. Pending staging scans of brain MRI, MRI C/T/L spine and CT CAP to r/o metastatic disease. Denies other significant problems or concerns.     Review of systems : All systems reviewed, and found to be negative except for the symptoms detailed above.    Physical examination:  Wt Readings from Last 3 Encounters:   07/06/22 66 kg (145 lb 6.4 oz)   06/21/22 67.1 kg (148 lb)   06/21/22 65.8 kg (145 lb)     Temp Readings from Last 3 Encounters:   07/06/22 97.7 °F (36.5 °C)   06/21/22 97.3 °F (36.3 °C) (Oral)   06/21/22 98.6 °F (37 °C) (Oral)     BP Readings from Last 3 Encounters:   07/06/22 104/68   06/21/22 103/66   06/03/22 114/67     Pulse Readings from Last 3 Encounters:   07/06/22 90   06/21/22 92   06/21/22 (!) 119     General: Alert and oriented. No acute distress; + fatigue   Eye: Pupils are equal, round and reactive to light, Extraocular movements are intact. Normal conjunctiva  HENT: Normocephalic. Oropharynx exam deferred; mask in place due to coronavirus  Neck: Supple, Non-tender  Respiratory: Respirations are non-labored, Symmetrical chest wall expansion. Breath sounds CTA bilaterally  Cardiovascular: Regular rate, rhythm, Normal peripheral perfusion, No bilateral lower extremity edema  Breast: Exam deferred  Gastrointestinal: Non-distended, Present bowel sounds   GYN: Exam deferred  Genitourinary: Exam deferred  Lymphatics: No lymphadenopathy appreciated  Musculoskeletal: Moves all extremities  Integumentary: Intact. Warm, dry. No rashes, or lesions to visible skin  Neurologic: No focal deficits. Gait not assessed; in WC  Psychiatric: Cooperative. Appropriate mood and affect     Lab Results   Component Value Date    WBC 6.5 07/06/2022     RBC 2.87 (L) 07/06/2022    HGB 9.8 (L) 07/06/2022    HCT 30.8 (L) 07/06/2022    .3 (H) 07/06/2022    MCH 34.1 (H) 07/06/2022    MCHC 31.8 (L) 07/06/2022    RDW 19.3 (H) 07/06/2022     07/06/2022    MPV 9.8 07/06/2022     CMP  Sodium Level   Date Value Ref Range Status   07/06/2022 140 136 - 145 mmol/L Final     Potassium Level   Date Value Ref Range Status   07/06/2022 3.9 3.5 - 5.1 mmol/L Final     Carbon Dioxide   Date Value Ref Range Status   07/06/2022 24 23 - 31 mmol/L Final     Blood Urea Nitrogen   Date Value Ref Range Status   07/06/2022 12.8 9.8 - 20.1 mg/dL Final     Creatinine   Date Value Ref Range Status   07/06/2022 1.04 (H) 0.55 - 1.02 mg/dL Final     Calcium Level Total   Date Value Ref Range Status   07/06/2022 9.6 8.4 - 10.2 mg/dL Final     Albumin Level   Date Value Ref Range Status   07/06/2022 3.1 (L) 3.4 - 4.8 gm/dL Final     Bilirubin Total   Date Value Ref Range Status   07/06/2022 0.9 <=1.5 mg/dL Final     Alkaline Phosphatase   Date Value Ref Range Status   07/06/2022 85 40 - 150 unit/L Final     Aspartate Aminotransferase   Date Value Ref Range Status   07/06/2022 16 5 - 34 unit/L Final     Alanine Aminotransferase   Date Value Ref Range Status   07/06/2022 10 0 - 55 unit/L Final     Estimated GFR-Non    Date Value Ref Range Status   07/06/2022 57 mls/min/1.73/m2 Final     Assessment:   #IDC right breast, presumed triple negative:   -History of right lower extremity DVT and right breast mass 02/2020; she did not follow-up   -Presentation: 11/2021: Palpable mass   -01/07/2022: Bilateral diagnostic mammogram with contrast and limited ultrasound right breast   -01/07/2022: Biopsy   -3.1 cm mass on mammogram, overall grade 3, right axillary lymph node biopsy positive   -ER low positive (1%).  AR negative (0%).  HER-2 negative by FISH.  Ki-67 high proliferation (50%)   >>>   For the purpose of neoadjuvant chemotherapy, since ER iis low positive, we will treat the  patient as if triple negative   >>>  -cT2 cN1 MX, grade 3, presumed triple negative, AJCC anatomic stage IIB, clinical prognostic stage IIIB   -02/22/2022: DEXA scan: Normal BMD   -02/22/2022: CT C/A/P with contrast for staging: Right breast mass with right axillary lymphadenopathy; bilateral adrenal nodules, statistically representing adenomas   -02/22/2022: Whole-body nuclear medicine bone scan: No bone metastasis   -03/02/2022: Mediport placed   -03/11/2022: TTE: LVEF 55-60%  -neoadjuvant DD AC started 04/13/2022; severe neutropenia, ANC 0.03, on 04/26/2022; therefore, cycle 2 held; treated with growth factor and prophylactic ciprofloxacin  -cycle 2 on 05/05/2022; cycle 3 on 05/19/2022; cycle 4 on 06/02/2022   -some progression on restaging mammogram and ultrasound 06/17/2022    #History of right lower extremity DVT 02/19/2020:   -02/19/2020: (Right leg swelling for 3 days) acute nonocclusive DVT in the right distal femoral vein; acute occlusive DVTs right popliteal and right peroneal veins   -Apparently, was also noted to have a breast lump (right breast, 3:00, a small lump, nontender, slightly irregular on palpation, not hard or fluctuant, no erythema)   -Apparently, [facility] ambulatory clinics tried to contact her but without any response    Plan:   -Despite the fact that ER is low positive (1%), regardless, in due course, will need adjuvant endocrine therapy   -Since ER positive (low positive), HER-2 negative, at least 1 axillary lymph node positive, grade 3 disease, and Ki-67 score >20%, therefore, in due course, will benefit from 2 years of adjuvant abemaciclib in combination with endocrine therapy   -Since this tumor may behave more like triple negative breast cancer, therefore, we have ordered genetic testing and counseling   -s/p neoadjuvant DD AC x4 (04/30/2022-06/02/2022)  >>>  -Some progression on restaging mammogram and ultrasound 06/17/2022 post neoadjuvant DDAC x4  >>>  -Neoadjuvant Taxol (weekly, 80 mg  per m2 IV) - pending     Chemotherapy regimen:   1. Doxorubicin 60 mg/m² IV day 1;   2. Cyclophosphamide 600 mg/m² IV day 1;   Cycled every 14 days for 4 cycles; followed by,   3.  Paclitaxel 175 mg/m² by 3 hour IV infusion on day 1, cycled every 14 days for 4 cycles; or, paclitaxel 80 mg/m² IV 1 hour infusion weekly for 12 weeks.   All dose-dense cycles are with myeloid growth factor support.    After neoadjuvant chemotherapy, will need BCS or mastectomy and surgical axillary staging.    -Adjuvant RT will depend upon lumpectomy versus mastectomy status, as well as surgical pathology  (in this case, since the right axillary lymph node is biopsy positive, therefore,  will need adjuvant radiotherapy whether she undergoes lumpectomy or mastectomy)    Adjuvant systemic therapy after preoperative systemic therapy:   1.  Consider adjuvant bisphosphonate therapy for risk reduction of distant metastasis for 3-5 years in postmenopausal patients with high risk node-negative or node positive tumors   >> Will offer her zoledronic acid 4 mg IV every 6 months x3-5 years in the adjuvant setting;   2.  Adjuvant endocrine therapy appropriate for ER positive postmenopausal patient;   3.  Adjuvant olaparib if germline BRCA1/2 mutation positive and residual disease after preoperative therapy and a clinical stage, pathologic stage, estrogen receptor status, and tumor grade (CPS+EG) = 3 or >3 (tumor is ER positive, HER-2 negative)   (If patient qualifies for olaparib, then, olaparib can be used concurrently with endocrine therapy but only after adjuvant radiotherapy)   4.  This patient has at least 1 axillary lymph node positive; has grade 3 disease; Ki-67 score is 50%; therefore, 2 years of adjuvant abemaciclib can be considered in combination with endocrine therapy    Normal BMD on baseline DEXA scan.    On 6/21/22 she presented to clinic with generalized weakness.  Feeling sick.  Unable to walk for last 2-3 weeks. No headaches, back  pain, or incontinence. Sent to emergency department for further evaluation and for imaging studies to rule out metastatic disease. Held initiation of Taxol chemotherapy. CXR unrevealing for acute cardiopulmonary abnormality; VQ scan with low probability for PE    She did not have CT imaging at her recent hospital visit as noted above; she is pending contrast enhanced CT scans of C/A/P, brain MRI with and without contrast, and MRI scan of spine with and without contrast to r/o metastatic disease. These are scheduled 7/15/22 and 7/25/22. Although improved somewhat, clinically she has ongoing fatigue and generalized weakness, decline in mobility; presenting in a WC today. With her current clinical picture; holding initiation of neoadjuvant Taxol today.    Follow up: Labs, MD visit, scan review in approx 2 weeks. She is hopeful she will feel stronger at that time and will be able to pursue chemotherapy.      Above discussed at length with the patient.  All questions answered.   Discussed labs and scans and gave her copies of relevant records.  She understands and agrees this plan.

## 2022-07-06 NOTE — Clinical Note
Labs, MD visit, scan review, treatment in 3 weeks - same day. Appointments should be after 7/25/22.   Thanks!

## 2022-07-15 ENCOUNTER — HOSPITAL ENCOUNTER (OUTPATIENT)
Dept: RADIOLOGY | Facility: HOSPITAL | Age: 67
Discharge: HOME | End: 2022-07-15
Attending: INTERNAL MEDICINE
Payer: MEDICAID

## 2022-07-15 DIAGNOSIS — Z17.1 MALIGNANT NEOPLASM OF RIGHT BREAST IN FEMALE, ESTROGEN RECEPTOR NEGATIVE, UNSPECIFIED SITE OF BREAST: ICD-10-CM

## 2022-07-15 DIAGNOSIS — Z86.718 HISTORY OF DEEP VENOUS THROMBOSIS (DVT) OF DISTAL VEIN OF RIGHT LOWER EXTREMITY: ICD-10-CM

## 2022-07-15 DIAGNOSIS — C50.911 MALIGNANT NEOPLASM OF RIGHT BREAST IN FEMALE, ESTROGEN RECEPTOR NEGATIVE, UNSPECIFIED SITE OF BREAST: ICD-10-CM

## 2022-07-15 LAB — CREAT SERPL-MCNC: 1.09 MG/DL (ref 0.55–1.02)

## 2022-07-15 PROCEDURE — 74177 CT ABD & PELVIS W/CONTRAST: CPT | Mod: TC

## 2022-07-15 PROCEDURE — 36415 COLL VENOUS BLD VENIPUNCTURE: CPT | Performed by: INTERNAL MEDICINE

## 2022-07-15 PROCEDURE — 82565 ASSAY OF CREATININE: CPT | Performed by: INTERNAL MEDICINE

## 2022-07-15 PROCEDURE — 25500020 PHARM REV CODE 255: Performed by: INTERNAL MEDICINE

## 2022-07-15 PROCEDURE — 71260 CT THORAX DX C+: CPT | Mod: TC

## 2022-07-15 RX ADMIN — IOPAMIDOL 100 ML: 755 INJECTION, SOLUTION INTRAVENOUS at 10:07

## 2022-07-25 ENCOUNTER — HOSPITAL ENCOUNTER (OUTPATIENT)
Dept: RADIOLOGY | Facility: HOSPITAL | Age: 67
Discharge: HOME | End: 2022-07-25
Attending: INTERNAL MEDICINE
Payer: MEDICAID

## 2022-07-25 DIAGNOSIS — Z86.718 HISTORY OF DEEP VENOUS THROMBOSIS (DVT) OF DISTAL VEIN OF RIGHT LOWER EXTREMITY: ICD-10-CM

## 2022-07-25 DIAGNOSIS — Z17.1 MALIGNANT NEOPLASM OF RIGHT BREAST IN FEMALE, ESTROGEN RECEPTOR NEGATIVE, UNSPECIFIED SITE OF BREAST: ICD-10-CM

## 2022-07-25 DIAGNOSIS — C50.911 MALIGNANT NEOPLASM OF RIGHT BREAST IN FEMALE, ESTROGEN RECEPTOR NEGATIVE, UNSPECIFIED SITE OF BREAST: ICD-10-CM

## 2022-07-25 PROCEDURE — A9577 INJ MULTIHANCE: HCPCS

## 2022-07-25 PROCEDURE — 72156 MRI NECK SPINE W/O & W/DYE: CPT | Mod: TC

## 2022-07-25 PROCEDURE — 72157 MRI CHEST SPINE W/O & W/DYE: CPT | Mod: TC

## 2022-07-25 PROCEDURE — 25500020 PHARM REV CODE 255

## 2022-07-25 PROCEDURE — 70553 MRI BRAIN STEM W/O & W/DYE: CPT | Mod: TC

## 2022-07-25 RX ADMIN — GADOBENATE DIMEGLUMINE 14 ML: 529 INJECTION, SOLUTION INTRAVENOUS at 11:07

## 2022-07-28 PROBLEM — K76.9 LESION OF LIVER: Status: ACTIVE | Noted: 2022-07-28

## 2022-07-28 PROBLEM — E27.8 ADRENAL NODULE: Status: ACTIVE | Noted: 2022-07-28

## 2022-07-28 PROBLEM — E27.9 ADRENAL NODULE: Status: ACTIVE | Noted: 2022-07-28

## 2022-07-29 ENCOUNTER — CLINICAL SUPPORT (OUTPATIENT)
Dept: HEMATOLOGY/ONCOLOGY | Facility: CLINIC | Age: 67
End: 2022-07-29
Payer: MEDICAID

## 2022-07-29 ENCOUNTER — OFFICE VISIT (OUTPATIENT)
Dept: HEMATOLOGY/ONCOLOGY | Facility: CLINIC | Age: 67
End: 2022-07-29
Payer: MEDICAID

## 2022-07-29 ENCOUNTER — TELEPHONE (OUTPATIENT)
Dept: HEMATOLOGY/ONCOLOGY | Facility: CLINIC | Age: 67
End: 2022-07-29
Payer: MEDICAID

## 2022-07-29 VITALS
OXYGEN SATURATION: 100 % | WEIGHT: 144.19 LBS | HEART RATE: 84 BPM | DIASTOLIC BLOOD PRESSURE: 67 MMHG | BODY MASS INDEX: 24.62 KG/M2 | HEIGHT: 64 IN | RESPIRATION RATE: 20 BRPM | TEMPERATURE: 98 F | SYSTOLIC BLOOD PRESSURE: 107 MMHG

## 2022-07-29 DIAGNOSIS — C50.911 MALIGNANT NEOPLASM OF RIGHT BREAST IN FEMALE, ESTROGEN RECEPTOR NEGATIVE, UNSPECIFIED SITE OF BREAST: ICD-10-CM

## 2022-07-29 DIAGNOSIS — Z86.718 HISTORY OF DEEP VENOUS THROMBOSIS (DVT) OF DISTAL VEIN OF RIGHT LOWER EXTREMITY: ICD-10-CM

## 2022-07-29 DIAGNOSIS — Z17.1 MALIGNANT NEOPLASM OF RIGHT BREAST IN FEMALE, ESTROGEN RECEPTOR NEGATIVE, UNSPECIFIED SITE OF BREAST: ICD-10-CM

## 2022-07-29 DIAGNOSIS — C50.011 MALIGNANT NEOPLASM OF NIPPLE OF RIGHT BREAST IN FEMALE, ESTROGEN RECEPTOR POSITIVE: Primary | ICD-10-CM

## 2022-07-29 DIAGNOSIS — E27.8 ADRENAL NODULE: ICD-10-CM

## 2022-07-29 DIAGNOSIS — Z17.0 MALIGNANT NEOPLASM OF NIPPLE OF RIGHT BREAST IN FEMALE, ESTROGEN RECEPTOR POSITIVE: Primary | ICD-10-CM

## 2022-07-29 DIAGNOSIS — K76.9 LESION OF LIVER: ICD-10-CM

## 2022-07-29 LAB
ALBUMIN SERPL-MCNC: 3.3 GM/DL (ref 3.4–4.8)
ALBUMIN/GLOB SERPL: 1 RATIO (ref 1.1–2)
ALP SERPL-CCNC: 95 UNIT/L (ref 40–150)
ALT SERPL-CCNC: 11 UNIT/L (ref 0–55)
AST SERPL-CCNC: 16 UNIT/L (ref 5–34)
BASOPHILS # BLD AUTO: 0.03 X10(3)/MCL (ref 0–0.2)
BASOPHILS NFR BLD AUTO: 0.7 %
BILIRUBIN DIRECT+TOT PNL SERPL-MCNC: 0.5 MG/DL
BUN SERPL-MCNC: 12 MG/DL (ref 9.8–20.1)
CALCIUM SERPL-MCNC: 9.7 MG/DL (ref 8.4–10.2)
CHLORIDE SERPL-SCNC: 108 MMOL/L (ref 98–107)
CO2 SERPL-SCNC: 27 MMOL/L (ref 23–31)
CREAT SERPL-MCNC: 0.95 MG/DL (ref 0.55–1.02)
EOSINOPHIL # BLD AUTO: 0.18 X10(3)/MCL (ref 0–0.9)
EOSINOPHIL NFR BLD AUTO: 4 %
ERYTHROCYTE [DISTWIDTH] IN BLOOD BY AUTOMATED COUNT: 13.4 % (ref 11.5–17)
GLOBULIN SER-MCNC: 3.4 GM/DL (ref 2.4–3.5)
GLUCOSE SERPL-MCNC: 153 MG/DL (ref 82–115)
HCT VFR BLD AUTO: 36.3 % (ref 37–47)
HGB BLD-MCNC: 11.3 GM/DL (ref 12–16)
IMM GRANULOCYTES # BLD AUTO: 0.02 X10(3)/MCL (ref 0–0.04)
IMM GRANULOCYTES NFR BLD AUTO: 0.4 %
LYMPHOCYTES # BLD AUTO: 0.5 X10(3)/MCL (ref 0.6–4.6)
LYMPHOCYTES NFR BLD AUTO: 11.2 %
MAGNESIUM SERPL-MCNC: 2.2 MG/DL (ref 1.6–2.6)
MCH RBC QN AUTO: 34.8 PG (ref 27–31)
MCHC RBC AUTO-ENTMCNC: 31.1 MG/DL (ref 33–36)
MCV RBC AUTO: 111.7 FL (ref 80–94)
MONOCYTES # BLD AUTO: 0.22 X10(3)/MCL (ref 0.1–1.3)
MONOCYTES NFR BLD AUTO: 4.9 %
NEUTROPHILS # BLD AUTO: 3.5 X10(3)/MCL (ref 2.1–9.2)
NEUTROPHILS NFR BLD AUTO: 78.8 %
NRBC BLD AUTO-RTO: 0 %
PLATELET # BLD AUTO: 137 X10(3)/MCL (ref 130–400)
PMV BLD AUTO: 9.7 FL (ref 7.4–10.4)
POTASSIUM SERPL-SCNC: 3.8 MMOL/L (ref 3.5–5.1)
PROT SERPL-MCNC: 6.7 GM/DL (ref 5.8–7.6)
RBC # BLD AUTO: 3.25 X10(6)/MCL (ref 4.2–5.4)
SODIUM SERPL-SCNC: 143 MMOL/L (ref 136–145)
TSH SERPL-ACNC: 1.5 UIU/ML (ref 0.35–4.94)
WBC # SPEC AUTO: 4.5 X10(3)/MCL (ref 4.5–11.5)

## 2022-07-29 PROCEDURE — 36415 COLL VENOUS BLD VENIPUNCTURE: CPT

## 2022-07-29 PROCEDURE — 3074F PR MOST RECENT SYSTOLIC BLOOD PRESSURE < 130 MM HG: ICD-10-PCS | Mod: CPTII,,, | Performed by: INTERNAL MEDICINE

## 2022-07-29 PROCEDURE — 1160F PR REVIEW ALL MEDS BY PRESCRIBER/CLIN PHARMACIST DOCUMENTED: ICD-10-PCS | Mod: CPTII,,, | Performed by: INTERNAL MEDICINE

## 2022-07-29 PROCEDURE — 1160F RVW MEDS BY RX/DR IN RCRD: CPT | Mod: CPTII,,, | Performed by: INTERNAL MEDICINE

## 2022-07-29 PROCEDURE — 99214 OFFICE O/P EST MOD 30 MIN: CPT | Mod: PBBFAC | Performed by: INTERNAL MEDICINE

## 2022-07-29 PROCEDURE — 1159F PR MEDICATION LIST DOCUMENTED IN MEDICAL RECORD: ICD-10-PCS | Mod: CPTII,,, | Performed by: INTERNAL MEDICINE

## 2022-07-29 PROCEDURE — 3078F PR MOST RECENT DIASTOLIC BLOOD PRESSURE < 80 MM HG: ICD-10-PCS | Mod: CPTII,,, | Performed by: INTERNAL MEDICINE

## 2022-07-29 PROCEDURE — 99214 OFFICE O/P EST MOD 30 MIN: CPT | Mod: S$PBB,,, | Performed by: INTERNAL MEDICINE

## 2022-07-29 PROCEDURE — 3008F BODY MASS INDEX DOCD: CPT | Mod: CPTII,,, | Performed by: INTERNAL MEDICINE

## 2022-07-29 PROCEDURE — 3074F SYST BP LT 130 MM HG: CPT | Mod: CPTII,,, | Performed by: INTERNAL MEDICINE

## 2022-07-29 PROCEDURE — 3078F DIAST BP <80 MM HG: CPT | Mod: CPTII,,, | Performed by: INTERNAL MEDICINE

## 2022-07-29 PROCEDURE — 3008F PR BODY MASS INDEX (BMI) DOCUMENTED: ICD-10-PCS | Mod: CPTII,,, | Performed by: INTERNAL MEDICINE

## 2022-07-29 PROCEDURE — 1159F MED LIST DOCD IN RCRD: CPT | Mod: CPTII,,, | Performed by: INTERNAL MEDICINE

## 2022-07-29 PROCEDURE — 99214 PR OFFICE/OUTPT VISIT, EST, LEVL IV, 30-39 MIN: ICD-10-PCS | Mod: S$PBB,,, | Performed by: INTERNAL MEDICINE

## 2022-07-29 NOTE — TELEPHONE ENCOUNTER
Refer to IR for biopsy of suspicious adrenal nodules.    Resume dose dense Taxol q.2 weeks x4 cycles.

## 2022-07-29 NOTE — PROGRESS NOTES
Past medical history: Obesity.  Tobacco abuse.   -02/19/2020: Acute nonocclusive DVT in the right distal femoral vein; acute occlusive DVTs right popliteal and right peroneal veins ((she never followed up on that)  Procedure/surgical history: Cholecystectomy (2001).  Bilateral tubal ligation (according to her).  Social history: .  Lives in [place], [place].  Has 2 children.  Children.  Does not work.  Smoked half a pack of cigarettes daily for 40 years; quit 2 weeks ago.  No alcohol or illicit drugs.  Family history: No family history of cancer.  Health maintenance: According to her, screening colonoscopy 5 years ago at [facility], probably revealing a benign polyp.  Menstrual and OB/GYN history: Menarche, age 13.  Menopause, age 59.  G2, P2.  No abortions or miscarriages.  Age at delivery of first child, 22.  Did not breast-feed her children.  Used birth control pills for 1 year, subsequently, bilateral tubal ligation.  No history of hormone replacement therapy.      Reason for follow-up:   -IDC right breast, presumed triple negative, AJCC stage IIB, clinical prognostic stage IIIB   -History of right lower extremity DVT      History of present illness:   66-year-old lady referred from surgery, with breast cancer.     She initially presented to emergency department 11/15/2021 for evaluation of breast pain and a palpable lump in the right breast.  Subsequent imaging studies and biopsy established diagnosis of invasive ductal carcinoma of right breast, axillary lymph node positive, ER low positive, MD negative, and HER-2 negative.     #IDC right breast, presumed triple negative:   -History of right lower extremity DVT and right breast mass 02/2020; she did not follow-up   -Presentation: 11/2021: Palpable mass   -01/07/2022: Bilateral diagnostic mammogram with contrast and limited ultrasound right breast   -01/07/2022: Biopsy   -3.1 cm mass on mammogram, overall grade 3, right axillary lymph node biopsy positive    -ER low positive (1%).  VT negative (0%).  HER-2 negative by FISH.  Ki-67 high proliferation (50%)   >>>   For the purpose of neoadjuvant chemotherapy, since ER is low positive, we will treat the patient as if triple negative   >>>  -cT2 cN1 MX, grade 3, presumed triple negative, AJCC anatomic stage IIB, clinical prognostic stage IIIB   -02/22/2022: DEXA scan: Normal BMD   -02/22/2022: CT C/A/P with contrast for staging: Right breast mass with right axillary lymphadenopathy; bilateral adrenal nodules, statistically representing adenomas   -02/22/2022: Whole-body nuclear medicine bone scan: No bone metastasis   -03/02/2022: Mediport placed   -03/11/2022: TTE: LVEF 55-60%  -neoadjuvant DD AC started 04/13/2022; severe neutropenia, ANC 0.03, on 04/26/2022; therefore, cycle 2 held; treated with growth factor and prophylactic ciprofloxacin  -cycle 2 on 05/05/2022; cycle 3 on 05/19/2022; cycle 4 on 06/02/2022   -some progression on restaging mammogram and ultrasound 06/17/2022  -06/21/2022:  She was supposed to start neoadjuvant dose dense Taxol but was sent to emergency department for evaluation because she was feeling extremely weak, sick, unable to walk for 2-3 weeks  -06/21/2022:  WBC, differential unremarkable.  Hemoglobin 9.2, stable.  Platelets 210 K. ANC 5.6.  CMP stable and within acceptable limits.  Magnesium normal.  BNP normal.  TSH 2.2180, normal.    -06/21/2022:  V/Q scan:  Normal/very low probability of pulmonary embolism  -07/15/2022:  CT C/A/P with contrast (comparison:  02/22/2022):   Mass in the right breast with associated skin thickening in the right breast overall slightly less prominent than the prior examination.  The right axillary lymphadenopathy is also less prominent than the prior examination   Interval development of multiple punctate hypoattenuating areas within the liver concerning for possible developing metastatic foci.  Short-term follow-up is recommended.  These nodules are too small to  characterize and are all subcentimeter in size.   Bilateral adrenal nodules concerning for metastatic foci (right adrenal nodule 2.3 x 1.9 cm, previously 2 cm x 2 cm; left adrenal nodule 1.6 x 1.5 cm)  Left renal cyst  -07/25/2022:  MRI cervical/thoracic/lumbar spine with and without contrast:  No metastatic disease  -07/25/2022:  Brain MRI with and without contrast:  No intracranial metastasis; minimal chronic microangiopathic ischemia      02/10/2022:   Presents for initial medical oncology consultation, accompanied by her .  Pleasant lady.  In no acute discomfort.   Occasional, mild right breast pain.  No skin changes or nipple discharge.   Good appetite.  No weakness or fatigue.  No new lumps or lymphadenopathy.  No abdominal pain, nausea, vomiting, GI bleeding, unusual headaches, focal neurological symptoms, chest pain, dyspnea, hemoptysis, etc.  ECOG 0-1.      Interval history:    06/21/2022:  -neoadjuvant DD AC started 04/13/2022; severe neutropenia, ANC 0.03, on 04/26/2022; therefore, cycle 2 held; treated with growth factor and prophylactic ciprofloxacin  -cycle 2 on 05/05/2022; cycle 3 on 05/19/2022; cycle 4 on 06/02/2022  -no showed 06/15/2022  -06/17/2022:  Bilateral diagnostic mammogram with contrast, limited ultrasound right breast pelvic comparison:  01/07/2022):  IMPRESSION: KNOWN BIOPSY PROVEN MALIGNANCY  Right Breast: Known Biopsy-Proven Malignancy (BI-RADS 6). Recommend surgical excision when clinically appropriate.  Left Breast: Benign (BI-RADS 2)   Recommendations:  1. Right retroareolar breast heterogeneously enhancing 3.8 (AP) x 3.5 (TR) x 3 (CC) cm (3.2 x 3.1 x 3.6 cm sonographically) spiculated mass which is biopsy proven IDC.  There has been progressive skin thickening and skin and nipple retraction with concern for nipple invasion.  It previously measured 3.5 (AP) x 2.6 (TR) x 2.8 (CC) cm (3 x 3.1 x 2.1 cm sonographically) on 01/07/2022.   2. Right axillary 3.6 x 1.3 x 1.3 cm lymph  node at the 11:00 position, 13 cm from nipple, which is biopsy proven metastatic carcinoma.  It measured approximately 3.5 x 0.9 x 1.4 cm on 12/21/2021  -06/21/2022:  Labs reviewed; hemoglobin 9.2, dropping since 05/04/2022;; creatinine 1.15, slightly elevated and stable;; ANC 5.6  Presents for a follow-up visit.  Not doing good.  For last couple of weeks, feeling sick, weak, and unable to walk.  Says that she is able to walk very little.  Denies bowel or urinary incontinence.  Denies back pain.  Denies unusual headaches or focal neurological symptoms.  No appetite.  Will send to malignancy department [facility] for imaging studies to rule out metastatic disease/cord compression.    07/29/2022:  -06/21/2022:  She was supposed to start neoadjuvant dose dense Taxol but was sent to emergency department for evaluation because she was feeling extremely weak, sick, unable to walk for 2-3 weeks  -06/21/2022:  WBC, differential unremarkable.  Hemoglobin 9.2, stable.  Platelets 210 K. ANC 5.6.  CMP stable and within acceptable limits.  Magnesium normal.  BNP normal.  TSH 2.2180, normal.    -06/21/2022:  V/Q scan:  Normal/very low probability of pulmonary embolism  -07/15/2022:  CT C/A/P with contrast (comparison:  02/22/2022):   Mass in the right breast with associated skin thickening in the right breast overall slightly less prominent than the prior examination.  The right axillary lymphadenopathy is also less prominent than the prior examination   Interval development of multiple punctate hypoattenuating areas within the liver concerning for possible developing metastatic foci.  Short-term follow-up is recommended.  These nodules are too small to characterize and are all subcentimeter in size.   Bilateral adrenal nodules concerning for metastatic foci (right adrenal nodule 2.3 x 1.9 cm, previously 2 cm x 2 cm; left adrenal nodule 1.6 x 1.5 cm)  Left renal cyst  -07/25/2022:  MRI cervical/thoracic/lumbar spine with and without  contrast:  No metastatic disease  -07/25/2022:  Brain MRI with and without contrast:  No intracranial metastasis; minimal chronic microangiopathic ischemia  Presents for a follow-up visit.  Feeling stronger.  Feeling back to normal.  Fair appetite.  No undue weakness, fatigue, anorexia, nausea, vomiting, bone pains, chest pain, cough, dyspnea, abdominal pain, nausea, vomiting, etc..  ECOG 1. Wishes to resume chemotherapy (neoadjuvant Taxol).        Review of systems   All systems reviewed, and found to be negative except for the symptoms detailed above.      Physical examination:   VITAL SIGNS:  Reviewed.      GENERAL:  In no apparent distress.   HEAD:  No signs of head trauma.   EYES:  Pupils are equal.  Extraocular motions intact.   EARS:  Hearing grossly intact.   MOUTH:  Oropharynx is normal.   NECK:  No adenopathy, no JVD.      CHEST:  Chest with clear breath sounds bilaterally.  No wheezes, rales, or rhonchi.   CARDIAC:  Regular rate and rhythm.  S1 and S2, without murmurs, gallops, or rubs.   VASCULAR:  No Edema.  Peripheral pulses normal and equal in all extremities.   ABDOMEN:  Soft, without detectable tenderness.  No sign of distention.  No   rebound or guarding, and no masses palpated.   Bowel Sounds normal.   MUSCULOSKELETAL:  Good range of motion of all major joints. Extremities without clubbing, cyanosis or edema.   NEUROLOGIC EXAM:  Alert and oriented x 3.  No focal sensory or strength deficits.   Speech normal.  Follows commands.   PSYCHIATRIC:  Mood normal.   SKIN:  No rash or lesions.      Assessment:   #IDC right breast, presumed triple negative:  To summarize:  -IDC right breast, practically, triple negative (ER 1%, low +; CA negative;  -right breast mass noted 02/2020; she did not follow-up her 2-); biopsy 01/07/2022  -ER low + (1%).  CA negative (0%).  HER2 negative.  Ki-67 high proliferation (50%).  -3.1 cm mass on mammogram.  Grade 3. Right axillary lymph node biopsy +  -cT2 cN1 MX, grade 3,  practically triple negative, AJCC anatomic stage IIB, clinical prognostic stage IIIB  -S/P neoadjuvant ddAC x4 (04/13/2022-06/02/2022), with some progression on restaging mammogram and ultrasound 06/17/2022  -suspicion of multiple punctate liver metastasis and bilateral adrenal metastasis on restaging CTs 07/15/2022      #History of right lower extremity DVT 02/19/2020:   -02/19/2020: (Right leg swelling for 3 days) acute nonocclusive DVT in the right distal femoral vein; acute occlusive DVTs right popliteal and right peroneal veins   -Apparently, was also noted to have a breast lump (right breast, 3:00, a small lump, nontender, slightly irregular on palpation, not hard or fluctuant, no erythema)   -Apparently, [facility] ambulatory clinics tried to contact her but without any response      Plan:  -S/P neoadjuvant ddAC x4 (04/13/2022-06/02/2022), with some progression on restaging mammogram and ultrasound 06/17/2022  -suspicion of multiple punctate liver metastasis and bilateral adrenal metastasis on restaging CTs 07/15/2022  >>>  -assess clinically if she is fit enough to start neoadjuvant chemotherapy (dose dense Taxol)  -check tumor markers including CEA, CA 15-3, and CA 27.29 levels  -will order FDG PET-CT for further clarification of metastasis, especially adrenal nodules (liver lesions are too small)  -in a couple of months, will repeat contrast enhanced CT scans of C/A/P for follow-up of multiple punctate liver lesions noted on CTs 07/15/2022  -will order a whole-body nuclear medicine bone scan at this time to rule out bone metastasis  -on breast cancer tissue, with order the following:  PIK3CA activating mutation, NTRK gene fusion, MSI/MMR, TMB  -genetic testing  -Refer to IR for biopsy of suspicious adrenal nodules  -Resume dose dense Taxol q.2 weeks x4 cycles.     -Despite the fact that ER is low positive (1%), regardless, in due course, will need adjuvant endocrine therapy   -Since ER positive (low positive),  HER-2 negative, at least 1 axillary lymph node positive, grade 3 disease, and Ki-67 score >20%, therefore, in due course, will benefit from 2 years of adjuvant abemaciclib in combination with endocrine therapy   -Since this tumor may behave more like triple negative breast cancer, therefore, we have   ordered genetic testing and counseling     Chemotherapy regimen:   1. Doxorubicin 60 mg/m² IV day 1;   2. Cyclophosphamide 600 mg/m² IV day 1;   Cycled every 14 days for 4 cycles; followed by,   3.  Paclitaxel 175 mg/m² by 3 hour IV infusion on day 1, cycled every 14 days for 4 cycles; or, paclitaxel 80 mg/m² IV 1 hour infusion weekly for 12 weeks.   All dose-dense cycles are with myeloid growth factor support.    If no metastasis, then, after neoadjuvant chemotherapy, will need BCS or mastectomy and surgical axillary staging.    -if no metastasis, then, adjuvant RT will depend upon lumpectomy versus mastectomy status, as well as surgical pathology   (in this case, since the right axillary lymph node is biopsy positive, therefore,  will need adjuvant radiotherapy whether she undergoes lumpectomy or mastectomy)    If no metastasis, then, adjuvant systemic therapy after preoperative systemic therapy:   1.  Consider adjuvant bisphosphonate therapy for risk reduction of distant metastasis for 3-5 years in postmenopausal patients with high risk node-negative or node positive tumors   >> Will offer her zoledronic acid 4 mg IV every 6 months x3-5 years in the adjuvant setting;   2.  Adjuvant endocrine therapy appropriate for ER positive postmenopausal patient;   3.  Adjuvant olaparib if germline BRCA1/2 mutation positive and residual disease after preoperative therapy and a clinical stage, pathologic stage, estrogen receptor status, and tumor grade (CPS+EG) = 3 or >3 (tumor is ER positive, HER-2 negative)   (If patient qualifies for olaparib, then, olaparib can be used concurrently with endocrine therapy but only after  adjuvant radiotherapy)   4.  This patient has at least 1 axillary lymph node positive; has grade 3 disease; Ki-67 score is 50%; therefore, 2 years of adjuvant abemaciclib can be considered in combination with endocrine therapy    Normal BMD on baseline DEXA scan.    Follow-up visit with me in 2 weeks.     Above discussed at length with the patient.  All questions answered.   Discussed labs and scans and gave her copies of relevant records.  She understands and agrees this plan.

## 2022-07-29 NOTE — TELEPHONE ENCOUNTER
Orders for today:    Check CEA, CA 27.29, and CA 15-3 levels today  PET-CT scan for staging  In 2 months, contrast enhanced CT scans of C/A/P to follow-up on multiple liver lesions noted on CT scan 07/15/2022  Whole-body nuclear medicine bone scan at this time    On breast cancer tissue, please order the following:  PIK3CA mutation, NTRK gene fusion, MSI/MMR, tumor mutational burden    Genetic testing    Follow-up visit with me in 2 weeks.

## 2022-08-08 ENCOUNTER — TELEPHONE (OUTPATIENT)
Dept: HEMATOLOGY/ONCOLOGY | Facility: CLINIC | Age: 67
End: 2022-08-08
Payer: MEDICAID

## 2022-08-08 NOTE — TELEPHONE ENCOUNTER
Message from Dr. Anderson's staff:    Dr. Anderson reviewed this referral for bilateral adrenal nodules and he stated we would not biopsy these nodules and he suggested that the pt get an adrenal protocol MRI to f/u. Note left in chart.    Please order adrenal protocol MRI.

## 2022-08-13 ENCOUNTER — TELEPHONE (OUTPATIENT)
Dept: HEMATOLOGY/ONCOLOGY | Facility: CLINIC | Age: 67
End: 2022-08-13
Payer: MEDICAID

## 2022-08-13 NOTE — TELEPHONE ENCOUNTER
07/29/2022:    Ordered tumor markers;   PET-CT for staging;   in 2 months (mid September), contrast enhanced CT scans of C/A/P to follow-up on multiple liver lesions noted on CT scan 07/15/2022;   whole-body nuclear medicine bone scan;   on breast cancer tissue, PIK3CA mutation, NTRK gene fusion, MSI/MMR, tumor mutational burden testing; genetic testing    07/29/2022: ordered resumption of dose dense Taxol    08/08/2022:  IR declined biopsy of bilateral adrenal nodules; adrenal protocol MRI orders    >>>    When is she going to start Taxol?  What is the status of imaging and labs ordered?

## 2022-08-13 NOTE — PROGRESS NOTES
Past medical history: Obesity.  Tobacco abuse.   -02/19/2020: Acute nonocclusive DVT in the right distal femoral vein; acute occlusive DVTs right popliteal and right peroneal veins ((she never followed up on that)  Procedure/surgical history: Cholecystectomy (2001).  Bilateral tubal ligation (according to her).  Social history: .  Lives in [place], [place].  Has 2 children.  Children.  Does not work.  Smoked half a pack of cigarettes daily for 40 years; quit 2 weeks ago.  No alcohol or illicit drugs.  Family history: No family history of cancer.  Health maintenance: According to her, screening colonoscopy 5 years ago at [facility], probably revealing a benign polyp.  Menstrual and OB/GYN history: Menarche, age 13.  Menopause, age 59.  G2, P2.  No abortions or miscarriages.  Age at delivery of first child, 22.  Did not breast-feed her children.  Used birth control pills for 1 year, subsequently, bilateral tubal ligation.  No history of hormone replacement therapy.      Reason for follow-up:   -IDC right breast, presumed triple negative, AJCC stage IIB, clinical prognostic stage IIIB   -History of right lower extremity DVT      History of present illness:   66-year-old lady referred from surgery, with breast cancer.     She initially presented to emergency department 11/15/2021 for evaluation of breast pain and a palpable lump in the right breast.  Subsequent imaging studies and biopsy established diagnosis of invasive ductal carcinoma of right breast, axillary lymph node positive, ER low positive, NH negative, and HER-2 negative.     #IDC right breast, presumed triple negative:   -History of right lower extremity DVT and right breast mass 02/2020; she did not follow-up   -Presentation: 11/2021: Palpable mass   -01/07/2022: Bilateral diagnostic mammogram with contrast and limited ultrasound right breast   -01/07/2022: Biopsy   -3.1 cm mass on mammogram, overall grade 3, right axillary lymph node biopsy positive    -ER low positive (1%).  OR negative (0%).  HER-2 negative by FISH.  Ki-67 high proliferation (50%)   >>>   For the purpose of neoadjuvant chemotherapy, since ER is low positive, we will treat the patient as if triple negative   >>>  -cT2 cN1 MX, grade 3, presumed triple negative, AJCC anatomic stage IIB, clinical prognostic stage IIIB   -02/22/2022: DEXA scan: Normal BMD   -02/22/2022: CT C/A/P with contrast for staging: Right breast mass with right axillary lymphadenopathy; bilateral adrenal nodules, statistically representing adenomas   -02/22/2022: Whole-body nuclear medicine bone scan: No bone metastasis   -03/02/2022: Mediport placed   -03/11/2022: TTE: LVEF 55-60%  -neoadjuvant DD AC started 04/13/2022; severe neutropenia, ANC 0.03, on 04/26/2022; therefore, cycle 2 held; treated with growth factor and prophylactic ciprofloxacin  -cycle 2 on 05/05/2022; cycle 3 on 05/19/2022; cycle 4 on 06/02/2022   -some progression on restaging mammogram and ultrasound 06/17/2022  -06/21/2022:  She was supposed to start neoadjuvant dose dense Taxol but was sent to emergency department for evaluation because she was feeling extremely weak, sick, unable to walk for 2-3 weeks  -06/21/2022:  WBC, differential unremarkable.  Hemoglobin 9.2, stable.  Platelets 210 K. ANC 5.6.  CMP stable and within acceptable limits.  Magnesium normal.  BNP normal.  TSH 2.2180, normal.    -06/21/2022:  V/Q scan:  Normal/very low probability of pulmonary embolism  -07/15/2022:  CT C/A/P with contrast (comparison:  02/22/2022):   Mass in the right breast with associated skin thickening in the right breast overall slightly less prominent than the prior examination.  The right axillary lymphadenopathy is also less prominent than the prior examination   Interval development of multiple punctate hypoattenuating areas within the liver concerning for possible developing metastatic foci.  Short-term follow-up is recommended.  These nodules are too small to  characterize and are all subcentimeter in size.   Bilateral adrenal nodules concerning for metastatic foci (right adrenal nodule 2.3 x 1.9 cm, previously 2 cm x 2 cm; left adrenal nodule 1.6 x 1.5 cm)  Left renal cyst  -07/25/2022:  MRI cervical/thoracic/lumbar spine with and without contrast:  No metastatic disease  -07/25/2022:  Brain MRI with and without contrast:  No intracranial metastasis; minimal chronic microangiopathic ischemia      02/10/2022:   Presents for initial medical oncology consultation, accompanied by her .     Interval history:    07/29/2022:  -06/21/2022:  She was supposed to start neoadjuvant dose dense Taxol but was sent to emergency department for evaluation because she was feeling extremely weak, sick, unable to walk for 2-3 weeks  -06/21/2022:  WBC, differential unremarkable.  Hemoglobin 9.2, stable.  Platelets 210 K. ANC 5.6.  CMP stable and within acceptable limits.  Magnesium normal.  BNP normal.  TSH 2.2180, normal.    -06/21/2022:  V/Q scan:  Normal/very low probability of pulmonary embolism  -07/15/2022:  CT C/A/P with contrast (comparison:  02/22/2022):   Mass in the right breast with associated skin thickening in the right breast overall slightly less prominent than the prior examination.  The right axillary lymphadenopathy is also less prominent than the prior examination; Interval development of multiple punctate hypoattenuating areas within the liver concerning for possible developing metastatic foci.  Short-term follow-up is recommended.  These nodules are too small to characterize and are all subcentimeter in size; bilateral adrenal nodules concerning for metastatic foci (right adrenal nodule 2.3 x 1.9 cm, previously 2 cm x 2 cm; left adrenal nodule 1.6 x 1.5 cm); left renal cyst  -07/25/2022:  MRI cervical/thoracic/lumbar spine with and without contrast:  No metastatic disease  -07/25/2022:  Brain MRI with and without contrast:  No intracranial metastasis; minimal  chronic microangiopathic ischemia  Presents for a follow-up visit.  Feeling stronger.  Feeling back to normal.  Fair appetite.  No undue weakness, fatigue, anorexia, nausea, vomiting, bone pains, chest pain, cough, dyspnea, abdominal pain, nausea, vomiting, etc..  ECOG 1. Wishes to resume chemotherapy (neoadjuvant Taxol).      08/15/2022:  -07/29/2022:  Ordered tumor markers; PET-CT for staging; in 2 months, contrast enhanced CT scans of C/A/P to follow-up on multiple liver lesions noted on CT scan 07/15/2022; whole-body nuclear medicine bone scan; on breast cancer tissue, PIK3CA mutation, NTRK gene fusion, MSI/MMR, tumor mutational burden testing; genetic testing  -07/29/2022:  Referred to IR for biopsy of suspicious adrenal nodules; ordered resumption of dose dense Taxol  -08/08/2022:  IR declined biopsy of bilateral adrenal nodules; adrenal protocol MRI orders  Presents for follow-up visit.  Doing well.  No weakness or fatigue.  Eating well.  ECOG 1.  Ready to start neoadjuvant dose dense Taxol.  No unusual headaches or focal neurological symptoms.  No abdominal pain, nausea, vomiting, chest pain, cough, or dyspnea.  No new lumps or lymphadenopathy.  Some pain over the right nipple.  No nipple discharge.  Fair appetite.      Review of systems   All systems reviewed, and found to be negative except for the symptoms detailed above.      Physical examination:   VITAL SIGNS:  Reviewed.      GENERAL:  In no apparent distress.   HEAD:  No signs of head trauma.   EYES:  Pupils are equal.  Extraocular motions intact.   EARS:  Hearing grossly intact.   MOUTH:  Oropharynx is normal.   NECK:  No adenopathy, no JVD.      CHEST:  Chest with clear breath sounds bilaterally.  No wheezes, rales, or rhonchi.   CARDIAC:  Regular rate and rhythm.  S1 and S2, without murmurs, gallops, or rubs.   VASCULAR:  No Edema.  Peripheral pulses normal and equal in all extremities.   ABDOMEN:  Soft, without detectable tenderness.  No sign of  distention.  No   rebound or guarding, and no masses palpated.   Bowel Sounds normal.   MUSCULOSKELETAL:  Good range of motion of all major joints. Extremities without clubbing, cyanosis or edema.   NEUROLOGIC EXAM:  Alert and oriented x 3.  No focal sensory or strength deficits.   Speech normal.  Follows commands.   PSYCHIATRIC:  Mood normal.   SKIN:  No rash or lesions.      Assessment:   #IDC right breast, presumed triple negative:  To summarize:  -IDC right breast, practically triple negative (ER 1%, low +; SD negative; HER2 negative)  -right breast mass noted 02/2020; she did not follow-up; biopsy 01/07/2022  -ER low + (1%).  SD negative (0%).  HER2 negative.  Ki-67 high proliferation (50%).  -3.1 cm mass on mammogram.  Grade 3. Right axillary lymph node biopsy +  -cT2 cN1 MX, grade 3, practically triple negative, AJCC anatomic stage IIB, clinical prognostic stage IIIB  -S/P neoadjuvant ddAC x4 (04/13/2022-06/02/2022), with some progression on restaging mammogram and ultrasound 06/17/2022  -suspicion of multiple punctate liver metastasis and bilateral adrenal metastasis on restaging CTs 07/15/2022      #History of right lower extremity DVT 02/19/2020:   -02/19/2020: (Right leg swelling for 3 days) acute nonocclusive DVT in the right distal femoral vein; acute occlusive DVTs right popliteal and right peroneal veins   -Apparently, was also noted to have a breast lump (right breast, 3:00, a small lump, nontender, slightly irregular on palpation, not hard or fluctuant, no erythema)   -Apparently, [facility] ambulatory clinics tried to contact her but without any response      Plan:  -07/29/2022:  Ordered tumor markers; PET-CT for staging; in 2 months, contrast enhanced CT scans of C/A/P to follow-up on multiple liver lesions noted on CT scan 07/15/2022; whole-body nuclear medicine bone scan; on breast cancer tissue, PIK3CA mutation, NTRK gene fusion, MSI/MMR, tumor mutational burden testing; genetic testing  -07/29/2022:   Referred to IR for biopsy of suspicious adrenal nodules; ordered resumption of dose dense Taxol  -08/08/2022:  IR declined biopsy of bilateral adrenal nodules; adrenal protocol MRI orders      -S/P neoadjuvant ddAC x4 (04/13/2022-06/02/2022), with some progression on restaging mammogram and ultrasound 06/17/2022  -suspicion of multiple punctate liver metastasis and bilateral adrenal metastasis on restaging CTs 07/15/2022  >>>  -check tumor markers including CEA, CA 15-3, and CA 27.29 levels (ordered 07/29/2022)  -will order FDG PET-CT for further clarification of metastasis, especially adrenal nodules (liver lesions are too small) (ordered 07/29/2022)  -in a couple of months (mid September), will repeat contrast enhanced CT scans of C/A/P for follow-up of multiple punctate liver lesions noted on CTs 07/15/2022  -will order whole-body nuclear medicine bone scan at this time to rule out bone metastasis (ordered 07/29/2022)  -on breast cancer tissue, with order the following:  PIK3CA activating mutation, NTRK gene fusion, MSI/MMR, TMB  -genetic testing  -referred to IR for biopsy of suspicious adrenal nodules; IR declined; ordered adrenal protocol MRI (08/08/2022)  -Resume dose dense Taxol q.2 weeks x4 cycles (ordered 07/29/2022)     -Despite the fact that ER is low positive (1%), regardless, in due course,   will need adjuvant endocrine therapy   -Since ER positive (low positive), HER-2 negative, at least 1 axillary lymph node positive, grade 3 disease, and Ki-67 score >20%, therefore, in due course, will benefit from   2 years of adjuvant abemaciclib in combination with endocrine therapy   -Since this tumor may behave more like triple negative breast cancer, therefore, we have   ordered genetic testing and counseling     Chemotherapy regimen:   1. Doxorubicin 60 mg/m² IV day 1;   2. Cyclophosphamide 600 mg/m² IV day 1;   Cycled every 14 days for 4 cycles; followed by,   3.  Paclitaxel 175 mg/m² by 3 hour IV infusion  on day 1, cycled every 14 days for 4 cycles; or, paclitaxel 80 mg/m² IV 1 hour infusion weekly for 12 weeks.   All dose-dense cycles are with myeloid growth factor support.    If no metastasis, then, after neoadjuvant chemotherapy, will need BCS or mastectomy and surgical axillary staging.    -since right axillary lymph node was biopsy +, therefore, whether she undergoes lumpectomy or mastectomy,  will need adjuvant radiotherapy    If no metastasis, then, adjuvant systemic therapy after preoperative systemic therapy:   1.  Consider adjuvant bisphosphonate therapy for risk reduction of distant metastasis for 3-5 years in postmenopausal patients with high risk node-negative or node positive tumors   >> Will offer her zoledronic acid 4 mg IV every 6 months x3-5 years in the adjuvant setting;   2.  Adjuvant endocrine therapy appropriate for ER positive postmenopausal patient;   3.  Adjuvant olaparib if germline BRCA1/2 mutation positive and residual disease after preoperative therapy and a clinical stage, pathologic stage, estrogen receptor status, and tumor grade (CPS+EG) = 3 or >3 (tumor is ER positive, HER-2 negative)   (If patient qualifies for olaparib, then, olaparib can be used concurrently with endocrine therapy but only after adjuvant radiotherapy)   4.  This patient has at least 1 axillary lymph node positive; has grade 3 disease; Ki-67 score is 50%; therefore,   2 years of adjuvant abemaciclib can be considered in combination with endocrine therapy    Normal BMD on baseline DEXA scan.    Follow-up visit with me in 3 weeks.     Above discussed at length with the patient.  All questions answered.   Discussed labs and scans and gave her copies of relevant records.  She understands and agrees this plan.

## 2022-08-15 ENCOUNTER — TELEPHONE (OUTPATIENT)
Dept: HEMATOLOGY/ONCOLOGY | Facility: CLINIC | Age: 67
End: 2022-08-15
Payer: MEDICAID

## 2022-08-15 ENCOUNTER — CLINICAL SUPPORT (OUTPATIENT)
Dept: HEMATOLOGY/ONCOLOGY | Facility: CLINIC | Age: 67
End: 2022-08-15
Payer: MEDICAID

## 2022-08-15 ENCOUNTER — OFFICE VISIT (OUTPATIENT)
Dept: HEMATOLOGY/ONCOLOGY | Facility: CLINIC | Age: 67
End: 2022-08-15
Payer: MEDICAID

## 2022-08-15 VITALS
WEIGHT: 145.31 LBS | HEART RATE: 86 BPM | BODY MASS INDEX: 24.81 KG/M2 | DIASTOLIC BLOOD PRESSURE: 76 MMHG | SYSTOLIC BLOOD PRESSURE: 123 MMHG | TEMPERATURE: 98 F | RESPIRATION RATE: 20 BRPM | OXYGEN SATURATION: 99 % | HEIGHT: 64 IN

## 2022-08-15 DIAGNOSIS — C50.011 MALIGNANT NEOPLASM OF NIPPLE OF RIGHT BREAST IN FEMALE, ESTROGEN RECEPTOR POSITIVE: Primary | ICD-10-CM

## 2022-08-15 DIAGNOSIS — Z17.0 MALIGNANT NEOPLASM OF NIPPLE OF RIGHT BREAST IN FEMALE, ESTROGEN RECEPTOR POSITIVE: ICD-10-CM

## 2022-08-15 DIAGNOSIS — Z17.0 MALIGNANT NEOPLASM OF NIPPLE OF RIGHT BREAST IN FEMALE, ESTROGEN RECEPTOR POSITIVE: Primary | ICD-10-CM

## 2022-08-15 DIAGNOSIS — C50.011 MALIGNANT NEOPLASM OF NIPPLE OF RIGHT BREAST IN FEMALE, ESTROGEN RECEPTOR POSITIVE: ICD-10-CM

## 2022-08-15 DIAGNOSIS — Z86.718 HISTORY OF DEEP VENOUS THROMBOSIS (DVT) OF DISTAL VEIN OF RIGHT LOWER EXTREMITY: ICD-10-CM

## 2022-08-15 DIAGNOSIS — K76.9 LESION OF LIVER: ICD-10-CM

## 2022-08-15 DIAGNOSIS — E27.8 ADRENAL NODULE: ICD-10-CM

## 2022-08-15 DIAGNOSIS — C50.911 INVASIVE DUCTAL CARCINOMA OF BREAST, RIGHT: ICD-10-CM

## 2022-08-15 LAB
ALBUMIN SERPL-MCNC: 3.4 GM/DL (ref 3.4–4.8)
ALBUMIN/GLOB SERPL: 1 RATIO (ref 1.1–2)
ALP SERPL-CCNC: 91 UNIT/L (ref 40–150)
ALT SERPL-CCNC: 10 UNIT/L (ref 0–55)
AST SERPL-CCNC: 19 UNIT/L (ref 5–34)
BASOPHILS # BLD AUTO: 0.03 X10(3)/MCL (ref 0–0.2)
BASOPHILS NFR BLD AUTO: 0.5 %
BILIRUBIN DIRECT+TOT PNL SERPL-MCNC: 0.5 MG/DL
BUN SERPL-MCNC: 12.5 MG/DL (ref 9.8–20.1)
CALCIUM SERPL-MCNC: 9.9 MG/DL (ref 8.4–10.2)
CHLORIDE SERPL-SCNC: 108 MMOL/L (ref 98–107)
CO2 SERPL-SCNC: 26 MMOL/L (ref 23–31)
CREAT SERPL-MCNC: 0.97 MG/DL (ref 0.55–1.02)
EOSINOPHIL # BLD AUTO: 0.1 X10(3)/MCL (ref 0–0.9)
EOSINOPHIL NFR BLD AUTO: 1.6 %
ERYTHROCYTE [DISTWIDTH] IN BLOOD BY AUTOMATED COUNT: 12.2 % (ref 11.5–17)
GFR SERPLBLD CREATININE-BSD FMLA CKD-EPI: >60 MLS/MIN/1.73/M2
GLOBULIN SER-MCNC: 3.3 GM/DL (ref 2.4–3.5)
GLUCOSE SERPL-MCNC: 115 MG/DL (ref 82–115)
HCT VFR BLD AUTO: 36.8 % (ref 37–47)
HGB BLD-MCNC: 12.2 GM/DL (ref 12–16)
IMM GRANULOCYTES # BLD AUTO: 0.01 X10(3)/MCL (ref 0–0.04)
IMM GRANULOCYTES NFR BLD AUTO: 0.2 %
LYMPHOCYTES # BLD AUTO: 1.03 X10(3)/MCL (ref 0.6–4.6)
LYMPHOCYTES NFR BLD AUTO: 16.9 %
MAGNESIUM SERPL-MCNC: 2.2 MG/DL (ref 1.6–2.6)
MCH RBC QN AUTO: 35.8 PG (ref 27–31)
MCHC RBC AUTO-ENTMCNC: 33.2 MG/DL (ref 33–36)
MCV RBC AUTO: 107.9 FL (ref 80–94)
MONOCYTES # BLD AUTO: 0.38 X10(3)/MCL (ref 0.1–1.3)
MONOCYTES NFR BLD AUTO: 6.2 %
NEUTROPHILS # BLD AUTO: 4.6 X10(3)/MCL (ref 2.1–9.2)
NEUTROPHILS NFR BLD AUTO: 74.6 %
NRBC BLD AUTO-RTO: 0 %
PLATELET # BLD AUTO: 189 X10(3)/MCL (ref 130–400)
PMV BLD AUTO: 9.6 FL (ref 7.4–10.4)
POTASSIUM SERPL-SCNC: 3.9 MMOL/L (ref 3.5–5.1)
PROT SERPL-MCNC: 6.7 GM/DL (ref 5.8–7.6)
RBC # BLD AUTO: 3.41 X10(6)/MCL (ref 4.2–5.4)
SODIUM SERPL-SCNC: 142 MMOL/L (ref 136–145)
TSH SERPL-ACNC: 1.21 UIU/ML (ref 0.35–4.94)
WBC # SPEC AUTO: 6.1 X10(3)/MCL (ref 4.5–11.5)

## 2022-08-15 PROCEDURE — 3008F BODY MASS INDEX DOCD: CPT | Mod: CPTII,,, | Performed by: INTERNAL MEDICINE

## 2022-08-15 PROCEDURE — 3074F SYST BP LT 130 MM HG: CPT | Mod: CPTII,,, | Performed by: INTERNAL MEDICINE

## 2022-08-15 PROCEDURE — 36415 COLL VENOUS BLD VENIPUNCTURE: CPT

## 2022-08-15 PROCEDURE — 99214 PR OFFICE/OUTPT VISIT, EST, LEVL IV, 30-39 MIN: ICD-10-PCS | Mod: S$PBB,,, | Performed by: INTERNAL MEDICINE

## 2022-08-15 PROCEDURE — 1160F PR REVIEW ALL MEDS BY PRESCRIBER/CLIN PHARMACIST DOCUMENTED: ICD-10-PCS | Mod: CPTII,,, | Performed by: INTERNAL MEDICINE

## 2022-08-15 PROCEDURE — 84443 ASSAY THYROID STIM HORMONE: CPT

## 2022-08-15 PROCEDURE — 3078F PR MOST RECENT DIASTOLIC BLOOD PRESSURE < 80 MM HG: ICD-10-PCS | Mod: CPTII,,, | Performed by: INTERNAL MEDICINE

## 2022-08-15 PROCEDURE — 1159F MED LIST DOCD IN RCRD: CPT | Mod: CPTII,,, | Performed by: INTERNAL MEDICINE

## 2022-08-15 PROCEDURE — 3074F PR MOST RECENT SYSTOLIC BLOOD PRESSURE < 130 MM HG: ICD-10-PCS | Mod: CPTII,,, | Performed by: INTERNAL MEDICINE

## 2022-08-15 PROCEDURE — 1160F RVW MEDS BY RX/DR IN RCRD: CPT | Mod: CPTII,,, | Performed by: INTERNAL MEDICINE

## 2022-08-15 PROCEDURE — 99214 OFFICE O/P EST MOD 30 MIN: CPT | Mod: S$PBB,,, | Performed by: INTERNAL MEDICINE

## 2022-08-15 PROCEDURE — 83735 ASSAY OF MAGNESIUM: CPT

## 2022-08-15 PROCEDURE — 80053 COMPREHEN METABOLIC PANEL: CPT

## 2022-08-15 PROCEDURE — 99214 OFFICE O/P EST MOD 30 MIN: CPT | Mod: PBBFAC | Performed by: INTERNAL MEDICINE

## 2022-08-15 PROCEDURE — 3008F PR BODY MASS INDEX (BMI) DOCUMENTED: ICD-10-PCS | Mod: CPTII,,, | Performed by: INTERNAL MEDICINE

## 2022-08-15 PROCEDURE — 85025 COMPLETE CBC W/AUTO DIFF WBC: CPT

## 2022-08-15 PROCEDURE — 1159F PR MEDICATION LIST DOCUMENTED IN MEDICAL RECORD: ICD-10-PCS | Mod: CPTII,,, | Performed by: INTERNAL MEDICINE

## 2022-08-15 PROCEDURE — 3078F DIAST BP <80 MM HG: CPT | Mod: CPTII,,, | Performed by: INTERNAL MEDICINE

## 2022-08-15 NOTE — TELEPHONE ENCOUNTER
Orders for today:     -07/29/2022:    # Ordered tumor markers (CEA, CA 27.29, Ca 15-3);   # PET-CT for staging;   # in 2 months, contrast enhanced CT scans of C/A/P to follow-up on multiple liver lesions noted on CT scan 07/15/2022;   # whole-body nuclear medicine bone scan;   # on breast cancer tissue, PIK3CA mutation, NTRK gene fusion, MSI/MMR, tumor mutational burden testing;   # genetic testing    -07/29/2022:    Ordered adrenal protocol MRI orders    Genetic testing    Resume dose dense Taxol    Follow-up visit with me in 3 weeks.

## 2022-08-16 LAB — CANCER AG15-3 SERPL IA-ACNC: 11 U/ML

## 2022-08-18 LAB — CANCER AG27-29 SERPL-ACNC: 16.1 U/ML

## 2022-08-31 ENCOUNTER — INFUSION (OUTPATIENT)
Dept: INFUSION THERAPY | Facility: HOSPITAL | Age: 67
End: 2022-08-31
Attending: INTERNAL MEDICINE
Payer: MEDICAID

## 2022-08-31 VITALS
DIASTOLIC BLOOD PRESSURE: 70 MMHG | RESPIRATION RATE: 20 BRPM | HEIGHT: 64 IN | WEIGHT: 147.5 LBS | SYSTOLIC BLOOD PRESSURE: 136 MMHG | OXYGEN SATURATION: 100 % | HEART RATE: 68 BPM | BODY MASS INDEX: 25.18 KG/M2

## 2022-08-31 DIAGNOSIS — C50.011 MALIGNANT NEOPLASM OF NIPPLE OF RIGHT BREAST IN FEMALE, UNSPECIFIED ESTROGEN RECEPTOR STATUS: Primary | ICD-10-CM

## 2022-08-31 PROCEDURE — 25000003 PHARM REV CODE 250

## 2022-08-31 PROCEDURE — 96367 TX/PROPH/DG ADDL SEQ IV INF: CPT

## 2022-08-31 PROCEDURE — 63600175 PHARM REV CODE 636 W HCPCS

## 2022-08-31 PROCEDURE — 63600175 PHARM REV CODE 636 W HCPCS: Performed by: NURSE PRACTITIONER

## 2022-08-31 PROCEDURE — 25000003 PHARM REV CODE 250: Performed by: NURSE PRACTITIONER

## 2022-08-31 PROCEDURE — 96415 CHEMO IV INFUSION ADDL HR: CPT

## 2022-08-31 PROCEDURE — 96375 TX/PRO/DX INJ NEW DRUG ADDON: CPT

## 2022-08-31 PROCEDURE — 96413 CHEMO IV INFUSION 1 HR: CPT

## 2022-08-31 RX ORDER — SODIUM CHLORIDE 0.9 % (FLUSH) 0.9 %
10 SYRINGE (ML) INJECTION
Status: DISCONTINUED | OUTPATIENT
Start: 2022-08-31 | End: 2022-08-31 | Stop reason: HOSPADM

## 2022-08-31 RX ORDER — HEPARIN 100 UNIT/ML
500 SYRINGE INTRAVENOUS
Status: CANCELLED | OUTPATIENT
Start: 2022-08-31

## 2022-08-31 RX ORDER — HEPARIN 100 UNIT/ML
500 SYRINGE INTRAVENOUS
Status: DISCONTINUED | OUTPATIENT
Start: 2022-08-31 | End: 2022-08-31 | Stop reason: HOSPADM

## 2022-08-31 RX ORDER — EPINEPHRINE 0.1 MG/ML
0.3 INJECTION INTRAVENOUS ONCE AS NEEDED
Status: DISCONTINUED | OUTPATIENT
Start: 2022-08-31 | End: 2022-08-31 | Stop reason: HOSPADM

## 2022-08-31 RX ORDER — DIPHENHYDRAMINE HYDROCHLORIDE 50 MG/ML
50 INJECTION INTRAMUSCULAR; INTRAVENOUS ONCE AS NEEDED
Status: CANCELLED | OUTPATIENT
Start: 2022-08-31

## 2022-08-31 RX ORDER — DIPHENHYDRAMINE HYDROCHLORIDE 50 MG/ML
50 INJECTION INTRAMUSCULAR; INTRAVENOUS ONCE AS NEEDED
Status: DISCONTINUED | OUTPATIENT
Start: 2022-08-31 | End: 2022-08-31 | Stop reason: HOSPADM

## 2022-08-31 RX ORDER — FAMOTIDINE 10 MG/ML
20 INJECTION INTRAVENOUS
Status: COMPLETED | OUTPATIENT
Start: 2022-08-31 | End: 2022-08-31

## 2022-08-31 RX ORDER — SODIUM CHLORIDE 0.9 % (FLUSH) 0.9 %
10 SYRINGE (ML) INJECTION
Status: CANCELLED | OUTPATIENT
Start: 2022-08-31

## 2022-08-31 RX ORDER — FAMOTIDINE 10 MG/ML
20 INJECTION INTRAVENOUS
Status: CANCELLED | OUTPATIENT
Start: 2022-08-31

## 2022-08-31 RX ORDER — ONDANSETRON HCL IN 0.9 % NACL 8 MG/50 ML
8 INTRAVENOUS SOLUTION, PIGGYBACK (ML) INTRAVENOUS
Status: COMPLETED | OUTPATIENT
Start: 2022-08-31 | End: 2022-08-31

## 2022-08-31 RX ORDER — EPINEPHRINE 0.3 MG/.3ML
0.3 INJECTION SUBCUTANEOUS ONCE AS NEEDED
Status: CANCELLED | OUTPATIENT
Start: 2022-08-31

## 2022-08-31 RX ADMIN — PACLITAXEL 312 MG: 6 INJECTION, SOLUTION INTRAVENOUS at 11:08

## 2022-08-31 RX ADMIN — FAMOTIDINE 20 MG: 10 INJECTION INTRAVENOUS at 10:08

## 2022-08-31 RX ADMIN — DEXAMETHASONE SODIUM PHOSPHATE 20 MG: 10 INJECTION INTRAMUSCULAR; INTRAVENOUS at 10:08

## 2022-08-31 RX ADMIN — SODIUM CHLORIDE: 9 INJECTION, SOLUTION INTRAVENOUS at 10:08

## 2022-08-31 RX ADMIN — Medication 8 MG: at 01:08

## 2022-08-31 RX ADMIN — Medication 500 UNITS: at 03:08

## 2022-08-31 RX ADMIN — DIPHENHYDRAMINE HYDROCHLORIDE 50 MG: 50 INJECTION INTRAMUSCULAR; INTRAVENOUS at 11:08

## 2022-09-01 ENCOUNTER — INFUSION (OUTPATIENT)
Dept: INFUSION THERAPY | Facility: HOSPITAL | Age: 67
End: 2022-09-01
Attending: INTERNAL MEDICINE
Payer: MEDICAID

## 2022-09-01 VITALS
TEMPERATURE: 98 F | SYSTOLIC BLOOD PRESSURE: 142 MMHG | DIASTOLIC BLOOD PRESSURE: 74 MMHG | RESPIRATION RATE: 18 BRPM | OXYGEN SATURATION: 100 % | HEART RATE: 80 BPM

## 2022-09-01 DIAGNOSIS — C50.011 MALIGNANT NEOPLASM OF NIPPLE OF RIGHT BREAST IN FEMALE, UNSPECIFIED ESTROGEN RECEPTOR STATUS: Primary | ICD-10-CM

## 2022-09-01 DIAGNOSIS — C50.911 INVASIVE DUCTAL CARCINOMA OF BREAST, RIGHT: Primary | ICD-10-CM

## 2022-09-01 PROCEDURE — 63600175 PHARM REV CODE 636 W HCPCS: Mod: TB | Performed by: NURSE PRACTITIONER

## 2022-09-01 PROCEDURE — 96372 THER/PROPH/DIAG INJ SC/IM: CPT

## 2022-09-01 RX ORDER — DEXAMETHASONE 4 MG/1
20 TABLET ORAL SEE ADMIN INSTRUCTIONS
Qty: 30 TABLET | Refills: 0 | Status: SHIPPED | OUTPATIENT
Start: 2022-09-01

## 2022-09-01 RX ADMIN — PEGFILGRASTIM-CBQV 6 MG: 6 INJECTION, SOLUTION SUBCUTANEOUS at 03:09

## 2022-09-04 RX ORDER — EPINEPHRINE 0.3 MG/.3ML
0.3 INJECTION SUBCUTANEOUS ONCE AS NEEDED
Status: CANCELLED | OUTPATIENT
Start: 2022-09-28

## 2022-09-04 RX ORDER — DIPHENHYDRAMINE HYDROCHLORIDE 50 MG/ML
50 INJECTION INTRAMUSCULAR; INTRAVENOUS ONCE AS NEEDED
Status: CANCELLED | OUTPATIENT
Start: 2022-09-28

## 2022-09-04 RX ORDER — SODIUM CHLORIDE 0.9 % (FLUSH) 0.9 %
10 SYRINGE (ML) INJECTION
Status: CANCELLED | OUTPATIENT
Start: 2022-09-14

## 2022-09-04 RX ORDER — DIPHENHYDRAMINE HYDROCHLORIDE 50 MG/ML
50 INJECTION INTRAMUSCULAR; INTRAVENOUS ONCE AS NEEDED
Status: CANCELLED | OUTPATIENT
Start: 2022-09-14

## 2022-09-04 RX ORDER — FAMOTIDINE 10 MG/ML
20 INJECTION INTRAVENOUS
Status: CANCELLED | OUTPATIENT
Start: 2022-09-28

## 2022-09-04 RX ORDER — FAMOTIDINE 10 MG/ML
20 INJECTION INTRAVENOUS
Status: CANCELLED | OUTPATIENT
Start: 2022-09-14

## 2022-09-04 RX ORDER — HEPARIN 100 UNIT/ML
500 SYRINGE INTRAVENOUS
Status: CANCELLED | OUTPATIENT
Start: 2022-09-14

## 2022-09-04 RX ORDER — EPINEPHRINE 0.3 MG/.3ML
0.3 INJECTION SUBCUTANEOUS ONCE AS NEEDED
Status: CANCELLED | OUTPATIENT
Start: 2022-09-14

## 2022-09-04 RX ORDER — SODIUM CHLORIDE 0.9 % (FLUSH) 0.9 %
10 SYRINGE (ML) INJECTION
Status: CANCELLED | OUTPATIENT
Start: 2022-09-28

## 2022-09-04 RX ORDER — HEPARIN 100 UNIT/ML
500 SYRINGE INTRAVENOUS
Status: CANCELLED | OUTPATIENT
Start: 2022-09-28

## 2022-09-05 NOTE — PROGRESS NOTES
Past medical history: Obesity.  Tobacco abuse.   -02/19/2020: Acute nonocclusive DVT in the right distal femoral vein; acute occlusive DVTs right popliteal and right peroneal veins ((she never followed up on that)  Procedure/surgical history: Cholecystectomy (2001).  Bilateral tubal ligation (according to her).  Social history: .  Lives in [place], [place].  Has 2 children.  Children.  Does not work.  Smoked half a pack of cigarettes daily for 40 years; quit 2 weeks ago.  No alcohol or illicit drugs.  Family history: No family history of cancer.  Health maintenance: According to her, screening colonoscopy 5 years ago at [facility], probably revealing a benign polyp.  Menstrual and OB/GYN history: Menarche, age 13.  Menopause, age 59.  G2, P2.  No abortions or miscarriages.  Age at delivery of first child, 22.  Did not breast-feed her children.  Used birth control pills for 1 year, subsequently, bilateral tubal ligation.  No history of hormone replacement therapy.      Reason for follow-up:   -IDC right breast, presumed triple negative, AJCC stage IIB, clinical prognostic stage IIIB   -History of right lower extremity DVT      History of present illness:   66-year-old lady referred from surgery, with breast cancer.     She initially presented to emergency department 11/15/2021 for evaluation of breast pain and a palpable lump in the right breast.  Subsequent imaging studies and biopsy established diagnosis of invasive ductal carcinoma of right breast, axillary lymph node positive, ER low positive, IA negative, and HER-2 negative.     #IDC right breast, presumed triple negative:   -History of right lower extremity DVT and right breast mass 02/2020; she did not follow-up   -Presentation: 11/2021: Palpable mass   -01/07/2022: Bilateral diagnostic mammogram with contrast and limited ultrasound right breast   -01/07/2022: Biopsy   -3.1 cm mass on mammogram, overall grade 3, right axillary lymph node biopsy positive    -ER low positive (1%).  CT negative (0%).  HER-2 negative by FISH.  Ki-67 high proliferation (50%)   >>>   For the purpose of neoadjuvant chemotherapy, since ER is low positive, we will treat the patient as if triple negative   >>>  -cT2 cN1 MX, grade 3, presumed triple negative, AJCC anatomic stage IIB, clinical prognostic stage IIIB   -02/22/2022: DEXA scan: Normal BMD   -02/22/2022: CT C/A/P with contrast for staging: Right breast mass with right axillary lymphadenopathy; bilateral adrenal nodules, statistically representing adenomas   -02/22/2022: Whole-body nuclear medicine bone scan: No bone metastasis   -03/02/2022: Mediport placed   -03/11/2022: TTE: LVEF 55-60%  -neoadjuvant DD AC started 04/13/2022; severe neutropenia, ANC 0.03, on 04/26/2022; therefore, cycle 2 held; treated with growth factor and prophylactic ciprofloxacin  -cycle 2 on 05/05/2022; cycle 3 on 05/19/2022; cycle 4 on 06/02/2022   -some progression on restaging mammogram and ultrasound 06/17/2022  -06/21/2022:  She was supposed to start neoadjuvant dose dense Taxol but was sent to emergency department for evaluation because she was feeling extremely weak, sick, unable to walk for 2-3 weeks  -06/21/2022:  WBC, differential unremarkable.  Hemoglobin 9.2, stable.  Platelets 210 K. ANC 5.6.  CMP stable and within acceptable limits.  Magnesium normal.  BNP normal.  TSH 2.2180, normal.    -06/21/2022:  V/Q scan:  Normal/very low probability of pulmonary embolism  -07/15/2022:  CT C/A/P with contrast (comparison:  02/22/2022):   Mass in the right breast with associated skin thickening in the right breast overall slightly less prominent than the prior examination.  The right axillary lymphadenopathy is also less prominent than the prior examination   Interval development of multiple punctate hypoattenuating areas within the liver concerning for possible developing metastatic foci.  Short-term follow-up is recommended.  These nodules are too small to  characterize and are all subcentimeter in size.   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  CA 27.29 level normal.  CA 15-3 level normal.  -neoadjuvant dose dense Taxol started 08/31/2022      02/10/2022:  Presents for initial medical oncology consultation, accompanied by her .       Interval history:    08/15/2022:  -07/29/2022:  Ordered tumor markers; PET-CT for staging; in 2 months, contrast enhanced CT scans of C/A/P to follow-up on multiple liver lesions noted on CT scan 07/15/2022; whole-body nuclear medicine bone scan; on breast cancer tissue, PIK3CA mutation, NTRK gene fusion, MSI/MMR, tumor mutational burden testing; genetic testing  -07/29/2022:  Referred to IR for biopsy of suspicious adrenal nodules; ordered resumption of dose dense Taxol  -08/08/2022:  IR declined biopsy of bilateral adrenal nodules; adrenal protocol MRI orders  Presents for follow-up visit.  Doing well.  No weakness or fatigue.  Eating well.  ECOG 1.  Ready to start neoadjuvant dose dense Taxol.  No unusual headaches or focal neurological symptoms.  No abdominal pain, nausea, vomiting, chest pain, cough, or dyspnea.  No new lumps or lymphadenopathy.  Some pain over the right nipple.  No nipple discharge.  Fair appetite.    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  CA 27.29 level normal.  CA 15-3 level normal.  -neoadjuvant dose dense Taxol started 08/31/2022  Presents for a follow-up visit.  Doing well.  No complaints.  Good appetite.  Good energy.  Some pain in right breast.  Some pain in right nipple.  No discharge or bleeding.  No unusual headaches, focal neurological symptoms, chest pain, cough, dyspnea, abdominal pain, nausea, vomiting.  ECOG 1.       Review of systems   All systems reviewed, and found to be negative except for the symptoms detailed above.      Physical examination:   VITAL SIGNS:  Reviewed.      GENERAL:  In no apparent distress.   HEAD:  No signs of head trauma.   EYES:  Pupils are equal.  Extraocular motions intact.   EARS:  Hearing grossly intact.   MOUTH:  Oropharynx is normal.   NECK:  No adenopathy, no JVD.      CHEST:  Chest with clear breath sounds bilaterally.  No wheezes, rales, or rhonchi.   CARDIAC:  Regular rate and rhythm.  S1 and S2, without murmurs, gallops, or rubs.   VASCULAR:  No Edema.  Peripheral pulses normal and equal in all extremities.   ABDOMEN:  Soft, without detectable tenderness.  No sign of distention.  No   rebound or guarding, and no masses palpated.   Bowel Sounds normal.   MUSCULOSKELETAL:  Good range of motion of all major joints. Extremities without clubbing, cyanosis or edema.   NEUROLOGIC EXAM:  Alert and oriented x 3.  No focal sensory or strength deficits.   Speech normal.  Follows commands.   PSYCHIATRIC:  Mood normal.   SKIN:  No rash or lesions.      Assessment:   #IDC right breast, practically triple negative:  To summarize:  -IDC right breast, practically triple negative (ER 1%, low +; VT negative; HER2 negative)  -right breast mass noted 02/2020; she did not  follow-up; biopsy 01/07/2022  -ER low + (1%).  GA negative (0%).  HER2 negative.  Ki-67 high proliferation (50%).  -3.1 cm mass on mammogram.  Grade 3. Right axillary lymph node biopsy +  -cT2 cN1 MX, grade 3, practically triple negative, AJCC anatomic stage IIB, clinical prognostic stage IIIB  -S/P neoadjuvant ddAC x4 (04/13/2022-06/02/2022), with some progression on restaging mammogram and ultrasound 06/17/2022  -suspicion of multiple punctate liver metastasis and bilateral adrenal metastasis on restaging CTs 07/15/2022  -neoadjuvant dose dense Taxol started 08/31/2022      # molecular markers:  -breast biopsy (01/07/2022):    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 + (DAVID hi 62.1%)      #History of right lower extremity DVT 02/19/2020:   -02/19/2020: (Right leg swelling for 3 days) acute nonocclusive DVT in the right distal femoral vein; acute occlusive DVTs right popliteal and right peroneal veins   -Apparently, at the same time, was also noted to have a breast lump (right breast, 3:00, a small lump, nontender, slightly irregular on palpation, not hard or fluctuant, no erythema)   -Apparently, [facility] ambulatory clinics tried to contact her but without any response      Plan:  -07/29/2022:  Ordered the following:  PET-CT for staging;   in 2 months, contrast enhanced CT scans of C/A/P to follow-up on multiple liver lesions noted on CT scan 07/15/2022;   whole-body nuclear medicine bone scan;   genetic testing  -Referred to IR for biopsy of suspicious adrenal nodules (08/08/2022: IR declined biopsy of bilateral adrenal nodules; adrenal protocol MRI ordered);       -S/P neoadjuvant ddAC x4 (04/13/2022-06/02/2022), with some progression on restaging mammogram and ultrasound 06/17/2022  -suspicion of multiple punctate liver metastasis and  bilateral adrenal metastasis on restaging CTs 07/15/2022  >>>  -CEA, CA 27.29, CA 15-3 levels normal (08/31/2022)  -07/29/2022: Ordered FDG PET-CT for further clarification of metastasis, especially adrenal nodules (liver lesions are too small) (ordered 07/29/2022)  -in a couple of months (mid September), will repeat contrast enhanced CT scans of C/A/P for follow-up of multiple punctate liver lesions noted on CTs 07/15/2022  -will order whole-body nuclear medicine bone scan at this time to rule out bone metastasis (ordered 07/29/2022)  -genetic testing  -referred to IR for biopsy of suspicious adrenal nodules; IR declined; ordered adrenal protocol MRI (08/08/2022)  -started neoadjuvant dose dense Taxol 08/31/2022; continue for 4 cycles, then repeat diagnostic mammogram and ultrasound of breast to assess response    -Despite the fact that ER is low positive (1%), regardless, in due course,   will need adjuvant endocrine therapy  -Since ER positive (low positive), HER-2 negative, at least 1 axillary lymph node positive, grade 3 disease, and Ki-67 score >20%, therefore, in due course, will benefit from   2 years of adjuvant abemaciclib in combination with endocrine therapy   -Since this tumor may behave more like triple negative breast cancer, therefore, we have   ordered genetic testing and counseling     Chemotherapy regimen:   1. Doxorubicin 60 mg/m² IV day 1;   2. Cyclophosphamide 600 mg/m² IV day 1;   Cycled every 14 days for 4 cycles; followed by,   3.  Paclitaxel 175 mg/m² by 3 hour IV infusion on day 1, cycled every 14 days for 4 cycles; or, paclitaxel 80 mg/m² IV 1 hour infusion weekly for 12 weeks.   All dose-dense cycles are with myeloid growth factor support.    If no metastasis, then, after neoadjuvant chemotherapy, will need BCS or mastectomy and surgical axillary staging.    -since right axillary lymph node was biopsy +, therefore, whether she undergoes lumpectomy or mastectomy,  will need adjuvant  radiotherapy    If no metastasis, then, adjuvant systemic therapy after preoperative systemic therapy:   1.  Consider adjuvant bisphosphonate therapy for risk reduction of distant metastasis for 3-5 years in postmenopausal patients with high risk node-negative or node positive tumors   >> Will offer her zoledronic acid 4 mg IV every 6 months x3-5 years in the adjuvant setting;   2.  Adjuvant endocrine therapy appropriate for ER positive postmenopausal patient;   3.  Adjuvant olaparib if germline BRCA1/2 mutation positive and residual disease after preoperative therapy and a clinical stage, pathologic stage, estrogen receptor status, and tumor grade (CPS+EG) = 3 or >3 (tumor is ER positive, HER-2 negative)   (If patient qualifies for olaparib, then, olaparib can be used concurrently with endocrine therapy but only after adjuvant radiotherapy)   4.  This patient has at least 1 axillary lymph node positive; has grade 3 disease; Ki-67 score is 50%; therefore,   2 years of adjuvant abemaciclib can be considered in combination with endocrine therapy    Normal BMD on baseline DEXA scan.    Follow-up with NP in 2 weeks.    Above discussed at length with the patient.  All questions answered.   Discussed labs and scans and gave her copies of relevant records.  She understands and agrees this plan.

## 2022-09-07 ENCOUNTER — OFFICE VISIT (OUTPATIENT)
Dept: HEMATOLOGY/ONCOLOGY | Facility: CLINIC | Age: 67
End: 2022-09-07
Payer: MEDICAID

## 2022-09-07 ENCOUNTER — CLINICAL SUPPORT (OUTPATIENT)
Dept: HEMATOLOGY/ONCOLOGY | Facility: CLINIC | Age: 67
End: 2022-09-07
Payer: MEDICAID

## 2022-09-07 ENCOUNTER — TELEPHONE (OUTPATIENT)
Dept: HEMATOLOGY/ONCOLOGY | Facility: CLINIC | Age: 67
End: 2022-09-07
Payer: MEDICAID

## 2022-09-07 VITALS
DIASTOLIC BLOOD PRESSURE: 76 MMHG | OXYGEN SATURATION: 100 % | HEART RATE: 88 BPM | RESPIRATION RATE: 18 BRPM | WEIGHT: 143.38 LBS | TEMPERATURE: 98 F | SYSTOLIC BLOOD PRESSURE: 127 MMHG | BODY MASS INDEX: 24.78 KG/M2

## 2022-09-07 DIAGNOSIS — E27.8 ADRENAL NODULE: ICD-10-CM

## 2022-09-07 DIAGNOSIS — C50.011 MALIGNANT NEOPLASM OF NIPPLE OF RIGHT BREAST IN FEMALE, UNSPECIFIED ESTROGEN RECEPTOR STATUS: Primary | ICD-10-CM

## 2022-09-07 DIAGNOSIS — Z86.718 HISTORY OF DEEP VENOUS THROMBOSIS (DVT) OF DISTAL VEIN OF RIGHT LOWER EXTREMITY: ICD-10-CM

## 2022-09-07 DIAGNOSIS — K76.9 LESION OF LIVER: ICD-10-CM

## 2022-09-07 LAB
ABS NEUT CALC (OHS): 1.62 X10(3)/MCL (ref 2.1–9.2)
ALBUMIN SERPL-MCNC: 3.7 GM/DL (ref 3.4–4.8)
ALBUMIN/GLOB SERPL: 1.1 RATIO (ref 1.1–2)
ALP SERPL-CCNC: 107 UNIT/L (ref 40–150)
ALT SERPL-CCNC: 11 UNIT/L (ref 0–55)
AST SERPL-CCNC: 12 UNIT/L (ref 5–34)
BILIRUBIN DIRECT+TOT PNL SERPL-MCNC: 0.8 MG/DL
BUN SERPL-MCNC: 15.6 MG/DL (ref 9.8–20.1)
CALCIUM SERPL-MCNC: 10.2 MG/DL (ref 8.4–10.2)
CHLORIDE SERPL-SCNC: 107 MMOL/L (ref 98–107)
CO2 SERPL-SCNC: 26 MMOL/L (ref 23–31)
CREAT SERPL-MCNC: 1.07 MG/DL (ref 0.55–1.02)
EOSINOPHIL NFR BLD MANUAL: 0.23 X10(3)/MCL (ref 0–0.9)
EOSINOPHIL NFR BLD MANUAL: 8 % (ref 0–8)
ERYTHROCYTE [DISTWIDTH] IN BLOOD BY AUTOMATED COUNT: 11.9 % (ref 11.5–17)
GFR SERPLBLD CREATININE-BSD FMLA CKD-EPI: 58 MLS/MIN/1.73/M2
GLOBULIN SER-MCNC: 3.3 GM/DL (ref 2.4–3.5)
GLUCOSE SERPL-MCNC: 103 MG/DL (ref 82–115)
HCT VFR BLD AUTO: 38.5 % (ref 37–47)
HGB BLD-MCNC: 12.7 GM/DL (ref 12–16)
IMM GRANULOCYTES # BLD AUTO: 0.35 X10(3)/MCL (ref 0–0.04)
IMM GRANULOCYTES NFR BLD AUTO: 11.9 %
LYMPH ABN # BLD MANUAL: 12 %
LYMPHOCYTES NFR BLD MANUAL: 0.7 X10(3)/MCL
LYMPHOCYTES NFR BLD MANUAL: 24 % (ref 13–40)
MCH RBC QN AUTO: 34 PG (ref 27–31)
MCHC RBC AUTO-ENTMCNC: 33 MG/DL (ref 33–36)
MCV RBC AUTO: 103.2 FL (ref 80–94)
METAMYELOCYTES NFR BLD MANUAL: 2 %
NEUTROPHILS NFR BLD MANUAL: 46 % (ref 47–80)
NEUTS BAND NFR BLD MANUAL: 8 % (ref 0–11)
NRBC BLD AUTO-RTO: 1.4 %
PLATELET # BLD AUTO: 124 X10(3)/MCL (ref 130–400)
PLATELET # BLD EST: ABNORMAL 10*3/UL
PMV BLD AUTO: 10.5 FL (ref 7.4–10.4)
POTASSIUM SERPL-SCNC: 4.3 MMOL/L (ref 3.5–5.1)
PROT SERPL-MCNC: 7 GM/DL (ref 5.8–7.6)
RBC # BLD AUTO: 3.73 X10(6)/MCL (ref 4.2–5.4)
RBC MORPH BLD: NORMAL
SODIUM SERPL-SCNC: 142 MMOL/L (ref 136–145)
WBC # SPEC AUTO: 2.9 X10(3)/MCL (ref 4.5–11.5)

## 2022-09-07 PROCEDURE — 99211 OFF/OP EST MAY X REQ PHY/QHP: CPT | Mod: PBBFAC | Performed by: INTERNAL MEDICINE

## 2022-09-07 PROCEDURE — 1159F PR MEDICATION LIST DOCUMENTED IN MEDICAL RECORD: ICD-10-PCS | Mod: CPTII,,, | Performed by: INTERNAL MEDICINE

## 2022-09-07 PROCEDURE — 99213 OFFICE O/P EST LOW 20 MIN: CPT | Mod: PBBFAC,27

## 2022-09-07 PROCEDURE — 1160F PR REVIEW ALL MEDS BY PRESCRIBER/CLIN PHARMACIST DOCUMENTED: ICD-10-PCS | Mod: CPTII,,, | Performed by: INTERNAL MEDICINE

## 2022-09-07 PROCEDURE — 1159F MED LIST DOCD IN RCRD: CPT | Mod: CPTII,,, | Performed by: INTERNAL MEDICINE

## 2022-09-07 PROCEDURE — 99214 OFFICE O/P EST MOD 30 MIN: CPT | Mod: S$PBB,,, | Performed by: INTERNAL MEDICINE

## 2022-09-07 PROCEDURE — 99214 PR OFFICE/OUTPT VISIT, EST, LEVL IV, 30-39 MIN: ICD-10-PCS | Mod: S$PBB,,, | Performed by: INTERNAL MEDICINE

## 2022-09-07 PROCEDURE — 1160F RVW MEDS BY RX/DR IN RCRD: CPT | Mod: CPTII,,, | Performed by: INTERNAL MEDICINE

## 2022-09-07 NOTE — TELEPHONE ENCOUNTER
Orders for today:     PET-CT for staging    If PET-CT scan not feasible, then, contrast enhanced CT scans of C/A/P and whole-body nuclear medicine bone scan middle of this month    Genetic testing     Adrenal protocol MRI    Continue neoadjuvant dose dense Taxol every 2 weeks    Repeat diagnostic mammogram and ultrasound of breast to assess response, after completion of 4 cycles of neoadjuvant Taxol    Follow-up with NP in 2 weeks.

## 2022-09-08 DIAGNOSIS — C50.011 MALIGNANT NEOPLASM OF NIPPLE OF RIGHT BREAST IN FEMALE, UNSPECIFIED ESTROGEN RECEPTOR STATUS: Primary | ICD-10-CM

## 2022-09-08 DIAGNOSIS — E27.8 ADRENAL NODULE: ICD-10-CM

## 2022-09-09 ENCOUNTER — HOSPITAL ENCOUNTER (OUTPATIENT)
Dept: RADIOLOGY | Facility: HOSPITAL | Age: 67
Discharge: HOME | End: 2022-09-09
Attending: INTERNAL MEDICINE
Payer: MEDICAID

## 2022-09-09 DIAGNOSIS — Z17.0 MALIGNANT NEOPLASM OF NIPPLE OF RIGHT BREAST IN FEMALE, ESTROGEN RECEPTOR POSITIVE: ICD-10-CM

## 2022-09-09 DIAGNOSIS — C50.011 MALIGNANT NEOPLASM OF NIPPLE OF RIGHT BREAST IN FEMALE, ESTROGEN RECEPTOR POSITIVE: ICD-10-CM

## 2022-09-09 PROCEDURE — 78306 BONE IMAGING WHOLE BODY: CPT | Mod: TC

## 2022-09-09 PROCEDURE — A9503 TC99M MEDRONATE: HCPCS

## 2022-09-14 ENCOUNTER — OFFICE VISIT (OUTPATIENT)
Dept: HEMATOLOGY/ONCOLOGY | Facility: CLINIC | Age: 67
End: 2022-09-14
Payer: MEDICAID

## 2022-09-14 ENCOUNTER — INFUSION (OUTPATIENT)
Dept: INFUSION THERAPY | Facility: HOSPITAL | Age: 67
End: 2022-09-14
Attending: INTERNAL MEDICINE
Payer: MEDICAID

## 2022-09-14 VITALS
SYSTOLIC BLOOD PRESSURE: 144 MMHG | BODY MASS INDEX: 24.41 KG/M2 | DIASTOLIC BLOOD PRESSURE: 84 MMHG | WEIGHT: 143 LBS | HEART RATE: 74 BPM | TEMPERATURE: 99 F | HEIGHT: 64 IN | OXYGEN SATURATION: 98 % | RESPIRATION RATE: 20 BRPM

## 2022-09-14 DIAGNOSIS — C50.011 MALIGNANT NEOPLASM OF NIPPLE OF RIGHT BREAST IN FEMALE, UNSPECIFIED ESTROGEN RECEPTOR STATUS: Primary | ICD-10-CM

## 2022-09-14 PROCEDURE — 96415 CHEMO IV INFUSION ADDL HR: CPT

## 2022-09-14 PROCEDURE — 1159F PR MEDICATION LIST DOCUMENTED IN MEDICAL RECORD: ICD-10-PCS | Mod: CPTII,,, | Performed by: NURSE PRACTITIONER

## 2022-09-14 PROCEDURE — 96375 TX/PRO/DX INJ NEW DRUG ADDON: CPT

## 2022-09-14 PROCEDURE — 3288F PR FALLS RISK ASSESSMENT DOCUMENTED: ICD-10-PCS | Mod: CPTII,,, | Performed by: NURSE PRACTITIONER

## 2022-09-14 PROCEDURE — 36593 DECLOT VASCULAR DEVICE: CPT

## 2022-09-14 PROCEDURE — 1101F PR PT FALLS ASSESS DOC 0-1 FALLS W/OUT INJ PAST YR: ICD-10-PCS | Mod: CPTII,,, | Performed by: NURSE PRACTITIONER

## 2022-09-14 PROCEDURE — 3288F FALL RISK ASSESSMENT DOCD: CPT | Mod: CPTII,,, | Performed by: NURSE PRACTITIONER

## 2022-09-14 PROCEDURE — 99214 OFFICE O/P EST MOD 30 MIN: CPT | Mod: S$PBB,,, | Performed by: NURSE PRACTITIONER

## 2022-09-14 PROCEDURE — 1101F PT FALLS ASSESS-DOCD LE1/YR: CPT | Mod: CPTII,,, | Performed by: NURSE PRACTITIONER

## 2022-09-14 PROCEDURE — 99213 OFFICE O/P EST LOW 20 MIN: CPT | Mod: PBBFAC,25 | Performed by: NURSE PRACTITIONER

## 2022-09-14 PROCEDURE — 3008F BODY MASS INDEX DOCD: CPT | Mod: CPTII,,, | Performed by: NURSE PRACTITIONER

## 2022-09-14 PROCEDURE — 3077F PR MOST RECENT SYSTOLIC BLOOD PRESSURE >= 140 MM HG: ICD-10-PCS | Mod: CPTII,,, | Performed by: NURSE PRACTITIONER

## 2022-09-14 PROCEDURE — 1160F PR REVIEW ALL MEDS BY PRESCRIBER/CLIN PHARMACIST DOCUMENTED: ICD-10-PCS | Mod: CPTII,,, | Performed by: NURSE PRACTITIONER

## 2022-09-14 PROCEDURE — 1159F MED LIST DOCD IN RCRD: CPT | Mod: CPTII,,, | Performed by: NURSE PRACTITIONER

## 2022-09-14 PROCEDURE — 3079F DIAST BP 80-89 MM HG: CPT | Mod: CPTII,,, | Performed by: NURSE PRACTITIONER

## 2022-09-14 PROCEDURE — 96413 CHEMO IV INFUSION 1 HR: CPT

## 2022-09-14 PROCEDURE — 99214 PR OFFICE/OUTPT VISIT, EST, LEVL IV, 30-39 MIN: ICD-10-PCS | Mod: S$PBB,,, | Performed by: NURSE PRACTITIONER

## 2022-09-14 PROCEDURE — 63600175 PHARM REV CODE 636 W HCPCS: Performed by: INTERNAL MEDICINE

## 2022-09-14 PROCEDURE — 3008F PR BODY MASS INDEX (BMI) DOCUMENTED: ICD-10-PCS | Mod: CPTII,,, | Performed by: NURSE PRACTITIONER

## 2022-09-14 PROCEDURE — 25000003 PHARM REV CODE 250: Performed by: INTERNAL MEDICINE

## 2022-09-14 PROCEDURE — 3079F PR MOST RECENT DIASTOLIC BLOOD PRESSURE 80-89 MM HG: ICD-10-PCS | Mod: CPTII,,, | Performed by: NURSE PRACTITIONER

## 2022-09-14 PROCEDURE — 3077F SYST BP >= 140 MM HG: CPT | Mod: CPTII,,, | Performed by: NURSE PRACTITIONER

## 2022-09-14 PROCEDURE — 1160F RVW MEDS BY RX/DR IN RCRD: CPT | Mod: CPTII,,, | Performed by: NURSE PRACTITIONER

## 2022-09-14 RX ORDER — EPINEPHRINE 0.1 MG/ML
0.3 INJECTION INTRAVENOUS ONCE AS NEEDED
Status: DISCONTINUED | OUTPATIENT
Start: 2022-09-14 | End: 2022-09-14 | Stop reason: HOSPADM

## 2022-09-14 RX ORDER — DIPHENHYDRAMINE HYDROCHLORIDE 50 MG/ML
50 INJECTION INTRAMUSCULAR; INTRAVENOUS ONCE AS NEEDED
Status: DISCONTINUED | OUTPATIENT
Start: 2022-09-14 | End: 2022-09-14 | Stop reason: HOSPADM

## 2022-09-14 RX ORDER — DIPHENHYDRAMINE HYDROCHLORIDE 50 MG/ML
50 INJECTION INTRAMUSCULAR; INTRAVENOUS
Status: COMPLETED | OUTPATIENT
Start: 2022-09-14 | End: 2022-09-14

## 2022-09-14 RX ORDER — HEPARIN 100 UNIT/ML
500 SYRINGE INTRAVENOUS
Status: DISCONTINUED | OUTPATIENT
Start: 2022-09-14 | End: 2022-09-14 | Stop reason: HOSPADM

## 2022-09-14 RX ORDER — FAMOTIDINE 10 MG/ML
20 INJECTION INTRAVENOUS
Status: COMPLETED | OUTPATIENT
Start: 2022-09-14 | End: 2022-09-14

## 2022-09-14 RX ORDER — SODIUM CHLORIDE 0.9 % (FLUSH) 0.9 %
10 SYRINGE (ML) INJECTION
Status: DISCONTINUED | OUTPATIENT
Start: 2022-09-14 | End: 2022-09-14 | Stop reason: HOSPADM

## 2022-09-14 RX ADMIN — ALTEPLASE 2 MG: 2.2 INJECTION, POWDER, LYOPHILIZED, FOR SOLUTION INTRAVENOUS at 09:09

## 2022-09-14 RX ADMIN — PACLITAXEL 312 MG: 6 INJECTION, SOLUTION INTRAVENOUS at 11:09

## 2022-09-14 RX ADMIN — FAMOTIDINE 20 MG: 10 INJECTION INTRAVENOUS at 11:09

## 2022-09-14 RX ADMIN — DEXAMETHASONE SODIUM PHOSPHATE 20 MG: 10 INJECTION INTRAMUSCULAR; INTRAVENOUS at 11:09

## 2022-09-14 RX ADMIN — DIPHENHYDRAMINE HYDROCHLORIDE 50 MG: 50 INJECTION, SOLUTION INTRAMUSCULAR; INTRAVENOUS at 11:09

## 2022-09-14 NOTE — PROGRESS NOTES
Reason for follow-up:   -IDC right breast, presumed triple negative, AJCC stage IIB, clinical prognostic stage IIIB   -History of right lower extremity DVT    Current Treatment:  -neoadjuvant dose dense Taxol   started 08/31/2022    Treatment History:  -03/02/2022: Mediport placed  neoadjuvant DD AC (4/13/2022-6/2/2022)    Past medical history: Obesity.  Tobacco abuse.   -02/19/2020: Acute nonocclusive DVT in the right distal femoral vein; acute occlusive DVTs right popliteal and right peroneal veins ((she never followed up on that)  Procedure/surgical history: Cholecystectomy (2001).  Bilateral tubal ligation (according to her).  Social history: .  Lives in [place], [place].  Has 2 children.  Children.  Does not work.  Smoked half a pack of cigarettes daily for 40 years; quit 2 weeks ago.  No alcohol or illicit drugs.  Family history: No family history of cancer.  Health maintenance: According to her, screening colonoscopy 5 years ago at [facility], probably revealing a benign polyp.  Menstrual and OB/GYN history: Menarche, age 13.  Menopause, age 59.  G2, P2.  No abortions or miscarriages.  Age at delivery of first child, 22.  Did not breast-feed her children.  Used birth control pills for 1 year, subsequently, bilateral tubal ligation.  No history of hormone replacement therapy.    History of present illness:   66-year-old lady referred from surgery, with breast cancer.     She initially presented to emergency department 11/15/2021 for evaluation of breast pain and a palpable lump in the right breast.  Subsequent imaging studies and biopsy established diagnosis of invasive ductal carcinoma of right breast, axillary lymph node positive, ER low positive, IL negative, and HER-2 negative.     #IDC right breast, presumed triple negative:   -History of right lower extremity DVT and right breast mass 02/2020; she did not follow-up   -Presentation: 11/2021: Palpable mass   -01/07/2022: Bilateral diagnostic mammogram  with contrast and limited ultrasound right breast   -01/07/2022: Biopsy   -3.1 cm mass on mammogram, overall grade 3, right axillary lymph node biopsy positive   -ER low positive (1%).  WV negative (0%).  HER-2 negative by FISH.  Ki-67 high proliferation (50%)   >>>   For the purpose of neoadjuvant chemotherapy, since ER is low positive, we will treat the patient as if triple negative   >>>  -cT2 cN1 MX, grade 3, presumed triple negative, AJCC anatomic stage IIB, clinical prognostic stage IIIB   -02/22/2022: DEXA scan: Normal BMD   -02/22/2022: CT C/A/P with contrast for staging: Right breast mass with right axillary lymphadenopathy; bilateral adrenal nodules, statistically representing adenomas   -02/22/2022: Whole-body nuclear medicine bone scan: No bone metastasis   -03/02/2022: Mediport placed   -03/11/2022: TTE: LVEF 55-60%  -neoadjuvant DD AC started 04/13/2022; severe neutropenia, ANC 0.03, on 04/26/2022; therefore, cycle 2 held; treated with growth factor and prophylactic ciprofloxacin  -cycle 2 on 05/05/2022; cycle 3 on 05/19/2022; cycle 4 on 06/02/2022   -some progression on restaging mammogram and ultrasound 06/17/2022  -06/21/2022:  She was supposed to start neoadjuvant dose dense Taxol but was sent to emergency department for evaluation because she was feeling extremely weak, sick, unable to walk for 2-3 weeks  -06/21/2022:  WBC, differential unremarkable.  Hemoglobin 9.2, stable.  Platelets 210 K. ANC 5.6.  CMP stable and within acceptable limits.  Magnesium normal.  BNP normal.  TSH 2.2180, normal.    -06/21/2022:  V/Q scan:  Normal/very low probability of pulmonary embolism  -07/15/2022:  CT C/A/P with contrast (comparison:  02/22/2022):   Mass in the right breast with associated skin thickening in the right breast overall slightly less prominent than the prior examination.  The right axillary lymphadenopathy is also less prominent than the prior examination   Interval development of multiple  punctate hypoattenuating areas within the liver concerning for possible developing metastatic foci.  Short-term follow-up is recommended.  These nodules are too small to characterize and are all subcentimeter in size.   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  CA 27.29 level normal.  CA 15-3 level normal.  -neoadjuvant dose dense Taxol started 08/31/2022    Interval history 9/14/2022: Patient presents to clinic alone for scheduled follow up for breast cancer. She is due to receive cycle 2 of Taxol today. She has no complaints, claims to be tolerating treatment well without any side effects. She is aware of upcoming scans. Lab work reviewed with patient, stable for treatment today. Discussed follow up appointments with patient. No further questions needs or concerns voiced.     Review of systems   All systems reviewed, and found to be negative except for the symptoms detailed above.    General: Alert and oriented. NAD  Eye: Pupils are equal, round and reactive to light, Extraocular movements are intact. Normal conjunctiva  HENT: Normocephalic. Oropharynx exam deferred; mask in place due to coronavirus  Neck: Supple, Non-tender  Respiratory: Respirations are non-labored, Symmetrical chest wall expansion. Breath sounds CTA bilaterally  Cardiovascular: Regular rate, rhythm,  Normal peripheral perfusion, No bilateral lower extremity edema  Gastrointestinal: Non-distended, Present bowel sounds   Genitourinary: Exam deferred  Lymphatics: No lymphadenopathy appreciated  Musculoskeletal: Moves all extremities  Integumentary: Intact. Warm, dry. No rashes, or lesions to visible skin  Neurologic: No focal deficits  Psychiatric: Cooperative. Appropriate mood and affect   ECOG Performance Scale: 0 - Capable of all self-care no restrictions    Assessment:   #IDC right breast, practically triple negative:  To summarize:  -IDC right breast, practically triple negative (ER 1%, low +; MT negative; HER2 negative)  -right breast mass noted 02/2020; she did not follow-up; biopsy 01/07/2022  -ER low + (1%).  MT negative (0%).  HER2 negative.  Ki-67 high proliferation (50%).  -3.1 cm mass on mammogram.  Grade 3. Right axillary lymph node biopsy +  -cT2 cN1 MX, grade 3, practically triple negative, AJCC anatomic stage IIB, clinical prognostic stage IIIB  -S/P neoadjuvant ddAC x4 (04/13/2022-06/02/2022), with some progression on restaging mammogram and ultrasound 06/17/2022  -suspicion of multiple punctate liver metastasis and bilateral adrenal metastasis on restaging CTs 07/15/2022  -neoadjuvant dose dense Taxol started 08/31/2022      # molecular markers:  -breast biopsy (01/07/2022):    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 + (DAVID hi 62.1%)      #History of right lower extremity DVT 02/19/2020:   -02/19/2020: (Right leg swelling for 3 days) acute nonocclusive DVT in the right distal femoral vein; acute occlusive DVTs right popliteal and right peroneal veins   -Apparently, at the same time, was also noted to have a breast lump (right breast, 3:00, a small lump, nontender, slightly irregular on palpation, not hard or  fluctuant, no erythema)   -Apparently, [facility] ambulatory clinics tried to contact her but without any response      Plan:  -07/29/2022:  Ordered the following:  PET-CT for staging;   in 2 months, contrast enhanced CT scans of C/A/P to follow-up on multiple liver lesions noted on CT scan 07/15/2022;   whole-body nuclear medicine bone scan;   genetic testing  -Referred to IR for biopsy of suspicious adrenal nodules (08/08/2022: IR declined biopsy of bilateral adrenal nodules; adrenal protocol MRI ordered);       Okay to proceed with cycle 2 of Taxol  Return to infusion on 9/15/2022 Fulphila injection  Follow up with   in 2 weeks with lab work prior to cycle 3 Taxol  Return to infusion on 9/29/2022 for Fulphilia injection     -S/P neoadjuvant ddAC x4 (04/13/2022-06/02/2022), with some progression on restaging mammogram and ultrasound 06/17/2022  -suspicion of multiple punctate liver metastasis and bilateral adrenal metastasis on restaging CTs 07/15/2022  >>>  -CEA, CA 27.29, CA 15-3 levels normal (08/31/2022)  -07/29/2022: Ordered FDG PET-CT for further clarification of metastasis, especially adrenal nodules (liver lesions are too small) (ordered 07/29/2022)  -in a couple of months (mid September), will repeat contrast enhanced CT scans of C/A/P for follow-up of multiple punctate liver lesions noted on CTs 07/15/2022  -will order whole-body nuclear medicine bone scan at this time to rule out bone metastasis (ordered 07/29/2022)  9/9/2022: No scintigraphic suggestion of skeletal metastatic involvement.  -genetic testing-pending  -referred to IR for biopsy of suspicious adrenal nodules; IR declined; ordered adrenal protocol MRI (08/08/2022)  -started neoadjuvant dose dense Taxol 08/31/2022; continue for 4 cycles, then repeat diagnostic mammogram and ultrasound of breast to assess response      -Despite the fact that ER is low positive (1%), regardless, in due course,   will need adjuvant endocrine therapy  -Since  ER positive (low positive), HER-2 negative, at least 1 axillary lymph node positive, grade 3 disease, and Ki-67 score >20%, therefore, in due course, will benefit from   2 years of adjuvant abemaciclib in combination with endocrine therapy   -Since this tumor may behave more like triple negative breast cancer, therefore, we have   ordered genetic testing and counseling     Chemotherapy regimen:   1. Doxorubicin 60 mg/m² IV day 1;   2. Cyclophosphamide 600 mg/m² IV day 1;   Cycled every 14 days for 4 cycles; followed by,   3.  Paclitaxel 175 mg/m² by 3 hour IV infusion on day 1, cycled every 14 days for 4 cycles; or, paclitaxel 80 mg/m² IV 1 hour infusion weekly for 12 weeks.   All dose-dense cycles are with myeloid growth factor support.    If no metastasis, then, after neoadjuvant chemotherapy, will need BCS or mastectomy and surgical axillary staging.    -since right axillary lymph node was biopsy +, therefore, whether she undergoes lumpectomy or mastectomy,  will need adjuvant radiotherapy    If no metastasis, then, adjuvant systemic therapy after preoperative systemic therapy:   1.  Consider adjuvant bisphosphonate therapy for risk reduction of distant metastasis for 3-5 years in postmenopausal patients with high risk node-negative or node positive tumors   >> Will offer her zoledronic acid 4 mg IV every 6 months x3-5 years in the adjuvant setting;   2.  Adjuvant endocrine therapy appropriate for ER positive postmenopausal patient;   3.  Adjuvant olaparib if germline BRCA1/2 mutation positive and residual disease after preoperative therapy and a clinical stage, pathologic stage, estrogen receptor status, and tumor grade (CPS+EG) = 3 or >3 (tumor is ER positive, HER-2 negative)   (If patient qualifies for olaparib, then, olaparib can be used concurrently with endocrine therapy but only after adjuvant radiotherapy)   4.  This patient has at least 1 axillary lymph node positive; has grade 3 disease; Ki-67 score is  50%; therefore,   2 years of adjuvant abemaciclib can be considered in combination with endocrine therapy    Normal BMD on baseline DEXA scan.    Above discussed at length with the patient.  All questions answered.   Discussed labs and scans and gave her copies of relevant records.  She understands and agrees to plan

## 2022-09-14 NOTE — Clinical Note
Infusion today cycle 2 Taxol Infusion 9/15/2022 for injection Follow up with  in 2 weeks 9/28/2022 cycle 3 Taxol+ lab work  Infusion injection 9/29/2022

## 2022-09-15 ENCOUNTER — INFUSION (OUTPATIENT)
Dept: INFUSION THERAPY | Facility: HOSPITAL | Age: 67
End: 2022-09-15
Attending: INTERNAL MEDICINE
Payer: MEDICAID

## 2022-09-15 VITALS
RESPIRATION RATE: 18 BRPM | DIASTOLIC BLOOD PRESSURE: 75 MMHG | HEART RATE: 90 BPM | SYSTOLIC BLOOD PRESSURE: 141 MMHG | TEMPERATURE: 98 F

## 2022-09-15 DIAGNOSIS — C50.011 MALIGNANT NEOPLASM OF NIPPLE OF RIGHT BREAST IN FEMALE, UNSPECIFIED ESTROGEN RECEPTOR STATUS: Primary | ICD-10-CM

## 2022-09-15 PROCEDURE — 63600175 PHARM REV CODE 636 W HCPCS: Mod: TB | Performed by: INTERNAL MEDICINE

## 2022-09-15 PROCEDURE — 96372 THER/PROPH/DIAG INJ SC/IM: CPT

## 2022-09-15 RX ADMIN — PEGFILGRASTIM-CBQV 6 MG: 6 INJECTION, SOLUTION SUBCUTANEOUS at 02:09

## 2022-09-19 ENCOUNTER — HOSPITAL ENCOUNTER (OUTPATIENT)
Dept: RADIOLOGY | Facility: HOSPITAL | Age: 67
Discharge: HOME | End: 2022-09-19
Attending: INTERNAL MEDICINE
Payer: MEDICAID

## 2022-09-19 DIAGNOSIS — Z17.0 MALIGNANT NEOPLASM OF NIPPLE OF RIGHT BREAST IN FEMALE, ESTROGEN RECEPTOR POSITIVE: ICD-10-CM

## 2022-09-19 DIAGNOSIS — C50.011 MALIGNANT NEOPLASM OF NIPPLE OF RIGHT BREAST IN FEMALE, ESTROGEN RECEPTOR POSITIVE: ICD-10-CM

## 2022-09-19 PROCEDURE — 78815 PET IMAGE W/CT SKULL-THIGH: CPT | Mod: TC

## 2022-09-27 PROBLEM — E04.1 RIGHT THYROID NODULE: Status: ACTIVE | Noted: 2022-09-27

## 2022-09-27 PROBLEM — C78.7 LIVER METASTASES: Status: ACTIVE | Noted: 2022-09-27

## 2022-09-27 RX ORDER — FAMOTIDINE 10 MG/ML
20 INJECTION INTRAVENOUS
Status: CANCELLED | OUTPATIENT
Start: 2022-10-12

## 2022-09-27 RX ORDER — EPINEPHRINE 0.3 MG/.3ML
0.3 INJECTION SUBCUTANEOUS ONCE AS NEEDED
Status: CANCELLED | OUTPATIENT
Start: 2022-10-12

## 2022-09-27 RX ORDER — SODIUM CHLORIDE 0.9 % (FLUSH) 0.9 %
10 SYRINGE (ML) INJECTION
Status: CANCELLED | OUTPATIENT
Start: 2022-10-12

## 2022-09-27 RX ORDER — DIPHENHYDRAMINE HYDROCHLORIDE 50 MG/ML
50 INJECTION INTRAMUSCULAR; INTRAVENOUS ONCE AS NEEDED
Status: CANCELLED | OUTPATIENT
Start: 2022-10-12

## 2022-09-27 RX ORDER — HEPARIN 100 UNIT/ML
500 SYRINGE INTRAVENOUS
Status: CANCELLED | OUTPATIENT
Start: 2022-10-12

## 2022-09-28 ENCOUNTER — INFUSION (OUTPATIENT)
Dept: INFUSION THERAPY | Facility: HOSPITAL | Age: 67
End: 2022-09-28
Attending: INTERNAL MEDICINE
Payer: MEDICAID

## 2022-09-28 ENCOUNTER — OFFICE VISIT (OUTPATIENT)
Dept: HEMATOLOGY/ONCOLOGY | Facility: CLINIC | Age: 67
End: 2022-09-28
Payer: MEDICAID

## 2022-09-28 VITALS
WEIGHT: 144.81 LBS | TEMPERATURE: 98 F | OXYGEN SATURATION: 100 % | DIASTOLIC BLOOD PRESSURE: 78 MMHG | SYSTOLIC BLOOD PRESSURE: 152 MMHG | HEIGHT: 64 IN | BODY MASS INDEX: 24.72 KG/M2 | HEART RATE: 66 BPM | RESPIRATION RATE: 20 BRPM

## 2022-09-28 VITALS — DIASTOLIC BLOOD PRESSURE: 77 MMHG | SYSTOLIC BLOOD PRESSURE: 137 MMHG | HEART RATE: 80 BPM

## 2022-09-28 DIAGNOSIS — C50.011 MALIGNANT NEOPLASM OF NIPPLE OF RIGHT BREAST IN FEMALE, UNSPECIFIED ESTROGEN RECEPTOR STATUS: Primary | ICD-10-CM

## 2022-09-28 DIAGNOSIS — Z86.718 HISTORY OF DEEP VENOUS THROMBOSIS (DVT) OF DISTAL VEIN OF RIGHT LOWER EXTREMITY: ICD-10-CM

## 2022-09-28 DIAGNOSIS — K76.9 LESION OF LIVER: ICD-10-CM

## 2022-09-28 DIAGNOSIS — C78.7 LIVER METASTASES: ICD-10-CM

## 2022-09-28 DIAGNOSIS — E27.8 ADRENAL NODULE: ICD-10-CM

## 2022-09-28 DIAGNOSIS — E04.1 RIGHT THYROID NODULE: ICD-10-CM

## 2022-09-28 PROCEDURE — A4216 STERILE WATER/SALINE, 10 ML: HCPCS | Performed by: INTERNAL MEDICINE

## 2022-09-28 PROCEDURE — 1159F MED LIST DOCD IN RCRD: CPT | Mod: CPTII,,, | Performed by: INTERNAL MEDICINE

## 2022-09-28 PROCEDURE — 96375 TX/PRO/DX INJ NEW DRUG ADDON: CPT

## 2022-09-28 PROCEDURE — 99215 PR OFFICE/OUTPT VISIT, EST, LEVL V, 40-54 MIN: ICD-10-PCS | Mod: S$PBB,,, | Performed by: INTERNAL MEDICINE

## 2022-09-28 PROCEDURE — 99213 OFFICE O/P EST LOW 20 MIN: CPT | Mod: PBBFAC,25 | Performed by: INTERNAL MEDICINE

## 2022-09-28 PROCEDURE — 1101F PR PT FALLS ASSESS DOC 0-1 FALLS W/OUT INJ PAST YR: ICD-10-PCS | Mod: CPTII,,, | Performed by: INTERNAL MEDICINE

## 2022-09-28 PROCEDURE — 3008F PR BODY MASS INDEX (BMI) DOCUMENTED: ICD-10-PCS | Mod: CPTII,,, | Performed by: INTERNAL MEDICINE

## 2022-09-28 PROCEDURE — 1159F PR MEDICATION LIST DOCUMENTED IN MEDICAL RECORD: ICD-10-PCS | Mod: CPTII,,, | Performed by: INTERNAL MEDICINE

## 2022-09-28 PROCEDURE — 3077F SYST BP >= 140 MM HG: CPT | Mod: CPTII,,, | Performed by: INTERNAL MEDICINE

## 2022-09-28 PROCEDURE — 1160F RVW MEDS BY RX/DR IN RCRD: CPT | Mod: CPTII,,, | Performed by: INTERNAL MEDICINE

## 2022-09-28 PROCEDURE — 3077F PR MOST RECENT SYSTOLIC BLOOD PRESSURE >= 140 MM HG: ICD-10-PCS | Mod: CPTII,,, | Performed by: INTERNAL MEDICINE

## 2022-09-28 PROCEDURE — 96415 CHEMO IV INFUSION ADDL HR: CPT

## 2022-09-28 PROCEDURE — 3288F PR FALLS RISK ASSESSMENT DOCUMENTED: ICD-10-PCS | Mod: CPTII,,, | Performed by: INTERNAL MEDICINE

## 2022-09-28 PROCEDURE — 25000003 PHARM REV CODE 250: Performed by: INTERNAL MEDICINE

## 2022-09-28 PROCEDURE — 1101F PT FALLS ASSESS-DOCD LE1/YR: CPT | Mod: CPTII,,, | Performed by: INTERNAL MEDICINE

## 2022-09-28 PROCEDURE — 3288F FALL RISK ASSESSMENT DOCD: CPT | Mod: CPTII,,, | Performed by: INTERNAL MEDICINE

## 2022-09-28 PROCEDURE — 63600175 PHARM REV CODE 636 W HCPCS: Performed by: INTERNAL MEDICINE

## 2022-09-28 PROCEDURE — 1160F PR REVIEW ALL MEDS BY PRESCRIBER/CLIN PHARMACIST DOCUMENTED: ICD-10-PCS | Mod: CPTII,,, | Performed by: INTERNAL MEDICINE

## 2022-09-28 PROCEDURE — 3078F PR MOST RECENT DIASTOLIC BLOOD PRESSURE < 80 MM HG: ICD-10-PCS | Mod: CPTII,,, | Performed by: INTERNAL MEDICINE

## 2022-09-28 PROCEDURE — 3078F DIAST BP <80 MM HG: CPT | Mod: CPTII,,, | Performed by: INTERNAL MEDICINE

## 2022-09-28 PROCEDURE — 99215 OFFICE O/P EST HI 40 MIN: CPT | Mod: S$PBB,,, | Performed by: INTERNAL MEDICINE

## 2022-09-28 PROCEDURE — 3008F BODY MASS INDEX DOCD: CPT | Mod: CPTII,,, | Performed by: INTERNAL MEDICINE

## 2022-09-28 PROCEDURE — 96413 CHEMO IV INFUSION 1 HR: CPT

## 2022-09-28 RX ORDER — DIPHENHYDRAMINE HYDROCHLORIDE 50 MG/ML
50 INJECTION INTRAMUSCULAR; INTRAVENOUS
Status: CANCELLED
Start: 2022-10-12

## 2022-09-28 RX ORDER — EPINEPHRINE 0.3 MG/.3ML
0.3 INJECTION SUBCUTANEOUS ONCE AS NEEDED
Status: DISCONTINUED | OUTPATIENT
Start: 2022-09-28 | End: 2022-09-28 | Stop reason: HOSPADM

## 2022-09-28 RX ORDER — DIPHENHYDRAMINE HYDROCHLORIDE 50 MG/ML
50 INJECTION INTRAMUSCULAR; INTRAVENOUS
Status: COMPLETED | OUTPATIENT
Start: 2022-09-28 | End: 2022-09-28

## 2022-09-28 RX ORDER — FAMOTIDINE 10 MG/ML
20 INJECTION INTRAVENOUS
Status: COMPLETED | OUTPATIENT
Start: 2022-09-28 | End: 2022-09-28

## 2022-09-28 RX ORDER — SODIUM CHLORIDE 0.9 % (FLUSH) 0.9 %
10 SYRINGE (ML) INJECTION
Status: DISCONTINUED | OUTPATIENT
Start: 2022-09-28 | End: 2022-09-28 | Stop reason: HOSPADM

## 2022-09-28 RX ORDER — HEPARIN 100 UNIT/ML
500 SYRINGE INTRAVENOUS
Status: DISCONTINUED | OUTPATIENT
Start: 2022-09-28 | End: 2022-09-28 | Stop reason: HOSPADM

## 2022-09-28 RX ORDER — DIPHENHYDRAMINE HYDROCHLORIDE 50 MG/ML
50 INJECTION INTRAMUSCULAR; INTRAVENOUS ONCE AS NEEDED
Status: DISCONTINUED | OUTPATIENT
Start: 2022-09-28 | End: 2022-09-28 | Stop reason: HOSPADM

## 2022-09-28 RX ADMIN — PALONOSETRON 0.25 MG: 0.05 INJECTION, SOLUTION INTRAVENOUS at 10:09

## 2022-09-28 RX ADMIN — SODIUM CHLORIDE, PRESERVATIVE FREE 10 ML: 5 INJECTION INTRAVENOUS at 01:09

## 2022-09-28 RX ADMIN — DIPHENHYDRAMINE HYDROCHLORIDE 50 MG: 50 INJECTION INTRAMUSCULAR; INTRAVENOUS at 09:09

## 2022-09-28 RX ADMIN — Medication 500 UNITS: at 01:09

## 2022-09-28 RX ADMIN — FAMOTIDINE 20 MG: 10 INJECTION, SOLUTION INTRAVENOUS at 09:09

## 2022-09-28 RX ADMIN — PACLITAXEL 312 MG: 6 INJECTION, SOLUTION INTRAVENOUS at 10:09

## 2022-09-28 RX ADMIN — SODIUM CHLORIDE: 9 INJECTION, SOLUTION INTRAVENOUS at 09:09

## 2022-09-28 NOTE — Clinical Note
Orders for today:   Need genetic testing Refer to IR for biopsy of PET + liver metastasis In the meantime, continue neoadjuvant dose dense Taxol (2 more cycles are left)  Please order thyroid ultrasound for evaluation of thyroid nodule noted on PET-CT  Follow-up with NP in 2 weeks.   Follow-up with me in 1 month, after liver biopsy by IR.

## 2022-09-28 NOTE — NURSING
Pt did labwork, saw provider, and is here for C 3 DD taxol.  Pt denies any pain.  Pt does report tingling to both index and middle fingers.

## 2022-09-28 NOTE — PROGRESS NOTES
Past medical history: Obesity.  Tobacco abuse.   -02/19/2020: Acute nonocclusive DVT in the right distal femoral vein; acute occlusive DVTs right popliteal and right peroneal veins ((she never followed up on that)  Procedure/surgical history: Cholecystectomy (2001).  Bilateral tubal ligation (according to her).  Social history: .  Lives in [place], [place].  Has 2 children.  Children.  Does not work.  Smoked half a pack of cigarettes daily for 40 years; quit 2 weeks ago.  No alcohol or illicit drugs.  Family history: No family history of cancer.  Health maintenance: According to her, screening colonoscopy 5 years ago at [facility], probably revealing a benign polyp.  Menstrual and OB/GYN history: Menarche, age 13.  Menopause, age 59.  G2, P2.  No abortions or miscarriages.  Age at delivery of first child, 22.  Did not breast-feed her children.  Used birth control pills for 1 year, subsequently, bilateral tubal ligation.  No history of hormone replacement therapy.      Reason for follow-up:   -IDC right breast, presumed triple negative, AJCC stage IIB, clinical prognostic stage IIIB   -History of right lower extremity DVT      History of present illness:   66-year-old lady referred from surgery, with breast cancer.    She initially presented to emergency department 11/15/2021 for evaluation of breast pain and a palpable lump in the right breast.  Subsequent imaging studies and biopsy established diagnosis of invasive ductal carcinoma of right breast, axillary lymph node positive, ER low positive, [place] negative, and HER-2 negative.    Oncologic history:   #IDC right breast, presumed triple negative:   -History of right lower extremity DVT and right breast mass 02/2020; she did not follow-up   -Presentation: 11/2021: Palpable mass   -01/07/2022: Bilateral diagnostic mammogram with contrast and limited ultrasound right breast   -01/07/2022: Biopsy   -3.1 cm mass on mammogram, overall grade 3, right axillary lymph  node biopsy positive   -ER low positive (1%).  VT negative (0%).  HER-2 negative by FISH.  Ki-67 high proliferation (50%)   >>>   For the purpose of neoadjuvant chemotherapy, since ER is low positive, we will treat the patient as if triple negative   >>>  -cT2 cN1 MX, grade 3, presumed triple negative, AJCC anatomic stage IIB, clinical prognostic stage IIIB   -02/22/2022: DEXA scan: Normal BMD   -02/22/2022: CT C/A/P with contrast for staging: Right breast mass with right axillary lymphadenopathy; bilateral adrenal nodules, statistically representing adenomas   -02/22/2022: Whole-body nuclear medicine bone scan: No bone metastasis   -03/02/2022: Mediport placed   -03/11/2022: TTE: LVEF 55-60%  -neoadjuvant DD AC started 04/13/2022; severe neutropenia, ANC 0.03, on 04/26/2022; therefore, cycle 2 held; treated with growth factor and prophylactic ciprofloxacin  -cycle 2 on 05/05/2022; cycle 3 on 05/19/2022; cycle 4 on 06/02/2022   -some progression on restaging mammogram and ultrasound 06/17/2022  -06/21/2022:  She was supposed to start neoadjuvant dose dense Taxol but was sent to emergency department for evaluation because she was feeling extremely weak, sick, unable to walk for 2-3 weeks  -06/21/2022:  WBC, differential unremarkable.  Hemoglobin 9.2, stable.  Platelets 210 K. ANC 5.6.  CMP stable and within acceptable limits.  Magnesium normal.  BNP normal.  TSH 2.2180, normal.    -06/21/2022:  V/Q scan:  Normal/very low probability of pulmonary embolism  -07/15/2022:  CT C/A/P with contrast (comparison:  02/22/2022):   Mass in the right breast with associated skin thickening in the right breast overall slightly less prominent than the prior examination.  The right axillary lymphadenopathy is also less prominent than the prior examination   Interval development of multiple punctate hypoattenuating areas within the liver concerning for possible developing metastatic foci.  Short-term follow-up is recommended.  These  nodules are too small to characterize and are all subcentimeter in size.   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  CA 27.29 level normal.  CA 15-3 level normal.  -neoadjuvant dose dense Taxol started 08/31/2022  -neoadjuvant dose dense Taxol: Cycle 1 (08/31/2022); cycle 2 (09/14/2022)  -09/09/2022: Whole-body nuclear medicine bone scan (comparison:  Bone scan 02/22/2022; CT C/A/P 0 07/15/2022): No bone metastasis  -09/19/2022: PET-CT to rule out metastatic disease (comparison:  09/09/2022 bone scan; 07/15/2022 CT C/A/P):  1. Slight interval enlargement of hypermetabolic right breast mass and associated right axillary node.  2. Metastatic right hepatic lesion (central aspect of right liver, 1.9 x 1.2 cm), with no other definite FDG-avid or aggressive appearing process through the neck, chest, abdomen, or pelvis.  3. Subcentimeter [place] foci are less conspicuous and again of limited characterization due to size and non-contrast protocol, but statistically favored to represent benign cysts.  Close attention on surveillance imaging is needed in order to ensure ongoing stability.  4. Incidental right adrenal adenoma (1.7 x 4.3 cm)  5. Diffuse osseous uptake is favored to reflect sequela of systemic therapeutic agent.  6. Hypermetabolic low-density nodule posterior right  hemithyroid, 1.6 x 1.4 cm, maximum SUV 4.6, every attenuation 39 HU      02/10/2022:  Presents for initial medical oncology consultation, accompanied by her .     Interval history    09/28/2022:  -neoadjuvant dose dense Taxol: Cycle 1 (08/31/2022); cycle 2 (09/14/2022)  -09/09/2022: Whole-body nuclear medicine bone scan (comparison:  Bone scan 02/22/2022; CT C/A/P 0 07/15/2022): No bone metastasis  -09/19/2022: PET-CT to rule out metastatic disease (comparison:  09/09/2022 bone scan; 07/15/2022 CT C/A/P):  1. Slight interval enlargement of hypermetabolic right breast mass and associated right axillary node.  2. Metastatic right hepatic lesion (central aspect of right liver, 1.9 x 1.2 cm), with no other definite FDG-avid or aggressive appearing process through the neck, chest, abdomen, or pelvis.  3. Subcentimeter [place] foci are less conspicuous and again of limited characterization due to size and non-contrast protocol, but statistically favored to represent benign cysts.  Close attention on surveillance imaging is needed in order to ensure ongoing stability.  4. Incidental right adrenal adenoma (1.7 x 4.3 cm)  5. Diffuse osseous uptake is favored to reflect sequela of systemic therapeutic agent.  6. Hypermetabolic low-density nodule posterior right hemithyroid, 1.6 x 1.4 cm, maximum SUV 4.6, every attenuation 39 HU  Presents for a follow-up visit.  Doing well.  Denies any symptoms whatsoever except for mild numbness in a couple of fingers of both hands.  No weakness.  Good appetite.  No nausea or vomiting.  No unusual headaches, focal neurological symptoms, chest pain, cough, dyspnea, either.  She feels that the tumor in right breast has almost disappeared.  Made her aware of the possibility of liver metastasis on PET-CT.      Review of systems   All systems reviewed, and found to be negative except for the symptoms detailed above.      Physical examination:   VITAL SIGNS:  Reviewed.      GENERAL:  In no  apparent distress.   HEAD:  No signs of head trauma.   EYES:  Pupils are equal.  Extraocular motions intact.   EARS:  Hearing grossly intact.   MOUTH:  Oropharynx is normal.   NECK:  No adenopathy, no JVD.      CHEST:  Chest with clear breath sounds bilaterally.  No wheezes, rales, or rhonchi.   CARDIAC:  Regular rate and rhythm.  S1 and S2, without murmurs, gallops, or rubs.   VASCULAR:  No Edema.  Peripheral pulses normal and equal in all extremities.   ABDOMEN:  Soft, without detectable tenderness.  No sign of distention.  No   rebound or guarding, and no masses palpated.   Bowel Sounds normal.   MUSCULOSKELETAL:  Good range of motion of all major joints. Extremities without clubbing, cyanosis or edema.   NEUROLOGIC EXAM:  Alert and oriented x 3.  No focal sensory or strength deficits.   Speech normal.  Follows commands.   PSYCHIATRIC:  Mood normal.   SKIN:  No rash or lesions.      Assessment:   #IDC right breast, practically triple negative:  To summarize:  -IDC right breast, practically triple negative (ER 1%, low +; MS negative; HER2 negative)  -right breast mass noted 02/2020; she did not follow-up; biopsy 01/07/2022  -ER low + (1%).  MS negative (0%).  HER2 negative.  Ki-67 high proliferation (50%).  -3.1 cm mass on mammogram.  Grade 3. Right axillary lymph node biopsy +  -cT2 cN1 MX, grade 3, practically triple negative, AJCC anatomic stage IIB, clinical prognostic stage IIIB  -S/P neoadjuvant ddAC x4 (04/13/2022-06/02/2022), with some progression on restaging mammogram and ultrasound 06/17/2022  -suspicion of multiple punctate liver metastasis and bilateral adrenal metastasis on restaging CTs 07/15/2022  -neoadjuvant dose dense Taxol started 08/31/2022  >>>  Development of liver metastases:  -PET-CT 09/19/2020 2: 1.9 x 1.2 cm right liver metastasis  -neoadjuvant dose dense Taxol cycle 2 on 09/14/2022      # molecular markers:  -breast biopsy (01/07/2022):    PIK3CA mutation +; PD-L1 expression (TPS < 1%;  CPS 5); BRCA1, BRCA2 negative;   HER2 negative; NTRK1-3 negative; microsatellite stable; TMB-low (TMB score 6.7 Mut/Mb);   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 + (DAVID hi 62.1%)      # right hemithyroid lesion:  -PET-CT 09/19/2020 2: 1.6 x 1.4 cm hypermetabolic low-density nodule posterior right hemithyroid, maximum SUV 4.6, every attenuation 39 HU      #History of right lower extremity DVT 02/19/2020:   -02/19/2020: (Right leg swelling for 3 days) acute nonocclusive DVT in the right distal femoral vein; acute occlusive DVTs right popliteal and right peroneal veins   -Apparently, at the same time, was also noted to have a breast lump (right breast, 3:00, a small lump, nontender, slightly irregular on palpation, not hard or fluctuant, no erythema)   -Apparently, [facility] ambulatory clinics tried to contact her but without any response      Plan:  -S/P neoadjuvant ddAC x4 (04/13/2022-06/02/2022), with some progression on restaging mammogram and ultrasound 06/17/2022   -suspicion of multiple punctate liver metastases and bilateral adrenal metastases on restaging CTs 07/15/2022  -tumor markers normal 08/31/2022  -neoadjuvant dose dense Taxol:  Cycle 1 (08/31/2022); cycle 2 (09/14/2022)  >>>  -PET-CT 09/19/2022 (was ordered 07/29/2022):  1.9 x 1.2 cm right liver metastasis  >>>  -genetic testing has been ordered (ordered 07/29/2022)  -will refer to IR for image guided biopsy of PET + liver metastasis (1.9 x 1.2 cm, maximum SUV 6.4)  -in the interim, continue neoadjuvant dose dense Taxol (2 more cycles are left)  -IR declined biopsy of suspicious adrenal nodules (on PET-CT, low suspicion of adrenal metastasis, anyway)    On PET-CT, right hemithyroid hypermetabolic low-density nodule, 1.6 x 1.4 cm, maximum SUV 4.6, every attenuation 39 HU  >>>  -will order thyroid ultrasound for evaluation    -Despite the fact that ER is low positive (1%), regardless, in due course,   will need adjuvant endocrine  therapy  -Since ER positive (low positive), HER-2 negative, at least 1 axillary lymph node positive, grade 3 disease, and Ki-67 score >20%, therefore, in due course, will benefit from   2 years of adjuvant abemaciclib in combination with endocrine therapy   -Since this tumor may behave more like triple negative breast cancer, therefore, we have   ordered genetic testing and counseling     Chemotherapy regimen:   1. Doxorubicin 60 mg/m² IV day 1;   2. Cyclophosphamide 600 mg/m² IV day 1;   Cycled every 14 days for 4 cycles; followed by,   3.  Paclitaxel 175 mg/m² by 3 hour IV infusion on day 1, cycled every 14 days for 4 cycles; or, paclitaxel 80 mg/m² IV 1 hour infusion weekly for 12 weeks.   All dose-dense cycles are with myeloid growth factor support.    If no metastasis, then, after neoadjuvant chemotherapy, will need BCS or mastectomy and surgical axillary staging.    -since right axillary lymph node was biopsy +, therefore, whether she undergoes lumpectomy or mastectomy,  will need adjuvant radiotherapy    If no metastasis, then, adjuvant systemic therapy after preoperative systemic therapy:   1.  Consider adjuvant bisphosphonate therapy for risk reduction of distant metastasis for 3-5 years in postmenopausal patients with high risk node-negative or node positive tumors   >> Will offer her zoledronic acid 4 mg IV every 6 months x3-5 years in the adjuvant setting;   2.  Adjuvant endocrine therapy appropriate for ER positive postmenopausal patient;   3.  Adjuvant olaparib if germline BRCA1/2 mutation positive and residual disease after preoperative therapy and a clinical stage, pathologic stage, estrogen receptor status, and tumor grade (CPS+EG) = 3 or >3 (tumor is ER positive, HER-2 negative)   (If patient qualifies for olaparib, then, olaparib can be used concurrently with endocrine therapy but only after adjuvant radiotherapy)   4.  This patient has at least 1 axillary lymph node positive; has grade 3 disease;  Ki-67 score is 50%; therefore,   2 years of adjuvant abemaciclib can be considered in combination with endocrine therapy    Normal BMD on baseline DEXA scan.    Follow-up with NP in 2 weeks.    Follow-up with me in 1 month, after liver biopsy by IR.    Above discussed at length with the patient.  All questions answered.   Discussed labs and scans and gave her copies of relevant records.  She understands and agrees this plan.

## 2022-09-29 ENCOUNTER — INFUSION (OUTPATIENT)
Dept: INFUSION THERAPY | Facility: HOSPITAL | Age: 67
End: 2022-09-29
Attending: INTERNAL MEDICINE
Payer: MEDICAID

## 2022-09-29 VITALS
HEIGHT: 64 IN | RESPIRATION RATE: 20 BRPM | TEMPERATURE: 98 F | BODY MASS INDEX: 24.72 KG/M2 | OXYGEN SATURATION: 99 % | HEART RATE: 84 BPM | DIASTOLIC BLOOD PRESSURE: 74 MMHG | WEIGHT: 144.81 LBS | SYSTOLIC BLOOD PRESSURE: 135 MMHG

## 2022-09-29 DIAGNOSIS — C50.011 MALIGNANT NEOPLASM OF NIPPLE OF RIGHT BREAST IN FEMALE, UNSPECIFIED ESTROGEN RECEPTOR STATUS: Primary | ICD-10-CM

## 2022-09-29 PROCEDURE — 96372 THER/PROPH/DIAG INJ SC/IM: CPT

## 2022-09-29 PROCEDURE — 63600175 PHARM REV CODE 636 W HCPCS: Mod: TB | Performed by: INTERNAL MEDICINE

## 2022-09-29 RX ADMIN — PEGFILGRASTIM-CBQV 6 MG: 6 INJECTION, SOLUTION SUBCUTANEOUS at 02:09

## 2022-10-12 ENCOUNTER — INFUSION (OUTPATIENT)
Dept: INFUSION THERAPY | Facility: HOSPITAL | Age: 67
End: 2022-10-12
Attending: INTERNAL MEDICINE
Payer: MEDICAID

## 2022-10-12 ENCOUNTER — OFFICE VISIT (OUTPATIENT)
Dept: HEMATOLOGY/ONCOLOGY | Facility: CLINIC | Age: 67
End: 2022-10-12
Payer: MEDICAID

## 2022-10-12 VITALS
BODY MASS INDEX: 24.07 KG/M2 | OXYGEN SATURATION: 98 % | HEIGHT: 64 IN | TEMPERATURE: 99 F | RESPIRATION RATE: 20 BRPM | DIASTOLIC BLOOD PRESSURE: 73 MMHG | HEART RATE: 72 BPM | SYSTOLIC BLOOD PRESSURE: 127 MMHG | WEIGHT: 141 LBS

## 2022-10-12 DIAGNOSIS — E27.8 ADRENAL NODULE: ICD-10-CM

## 2022-10-12 DIAGNOSIS — Z86.718 HISTORY OF DEEP VENOUS THROMBOSIS (DVT) OF DISTAL VEIN OF RIGHT LOWER EXTREMITY: ICD-10-CM

## 2022-10-12 DIAGNOSIS — C78.7 LIVER METASTASES: ICD-10-CM

## 2022-10-12 DIAGNOSIS — C50.011 MALIGNANT NEOPLASM OF NIPPLE OF RIGHT BREAST IN FEMALE, UNSPECIFIED ESTROGEN RECEPTOR STATUS: Primary | ICD-10-CM

## 2022-10-12 DIAGNOSIS — K76.9 LESION OF LIVER: ICD-10-CM

## 2022-10-12 DIAGNOSIS — E04.1 RIGHT THYROID NODULE: ICD-10-CM

## 2022-10-12 DIAGNOSIS — C50.011 MALIGNANT NEOPLASM OF NIPPLE OF RIGHT BREAST IN FEMALE, UNSPECIFIED ESTROGEN RECEPTOR STATUS: ICD-10-CM

## 2022-10-12 DIAGNOSIS — Z51.11 ENCOUNTER FOR CHEMOTHERAPY MANAGEMENT: ICD-10-CM

## 2022-10-12 LAB
ALBUMIN SERPL-MCNC: 3.6 GM/DL (ref 3.4–4.8)
ALBUMIN/GLOB SERPL: 1.2 RATIO (ref 1.1–2)
ALP SERPL-CCNC: 113 UNIT/L (ref 40–150)
ALT SERPL-CCNC: 9 UNIT/L (ref 0–55)
AST SERPL-CCNC: 11 UNIT/L (ref 5–34)
BASOPHILS # BLD AUTO: 0.03 X10(3)/MCL (ref 0–0.2)
BASOPHILS NFR BLD AUTO: 0.3 %
BILIRUBIN DIRECT+TOT PNL SERPL-MCNC: 0.5 MG/DL
BUN SERPL-MCNC: 14.1 MG/DL (ref 9.8–20.1)
CALCIUM SERPL-MCNC: 9.6 MG/DL (ref 8.4–10.2)
CHLORIDE SERPL-SCNC: 109 MMOL/L (ref 98–107)
CO2 SERPL-SCNC: 25 MMOL/L (ref 23–31)
CREAT SERPL-MCNC: 0.83 MG/DL (ref 0.55–1.02)
EOSINOPHIL # BLD AUTO: 0.05 X10(3)/MCL (ref 0–0.9)
EOSINOPHIL NFR BLD AUTO: 0.5 %
ERYTHROCYTE [DISTWIDTH] IN BLOOD BY AUTOMATED COUNT: 15 % (ref 11.5–17)
GFR SERPLBLD CREATININE-BSD FMLA CKD-EPI: >60 MLS/MIN/1.73/M2
GLOBULIN SER-MCNC: 2.9 GM/DL (ref 2.4–3.5)
GLUCOSE SERPL-MCNC: 99 MG/DL (ref 82–115)
HCT VFR BLD AUTO: 33.2 % (ref 37–47)
HGB BLD-MCNC: 10.5 GM/DL (ref 12–16)
IMM GRANULOCYTES # BLD AUTO: 0.16 X10(3)/MCL (ref 0–0.04)
IMM GRANULOCYTES NFR BLD AUTO: 1.5 %
LYMPHOCYTES # BLD AUTO: 0.75 X10(3)/MCL (ref 0.6–4.6)
LYMPHOCYTES NFR BLD AUTO: 7 %
MAGNESIUM SERPL-MCNC: 1.9 MG/DL (ref 1.6–2.6)
MCH RBC QN AUTO: 33.5 PG (ref 27–31)
MCHC RBC AUTO-ENTMCNC: 31.6 MG/DL (ref 33–36)
MCV RBC AUTO: 106.1 FL (ref 80–94)
MONOCYTES # BLD AUTO: 0.51 X10(3)/MCL (ref 0.1–1.3)
MONOCYTES NFR BLD AUTO: 4.7 %
NEUTROPHILS # BLD AUTO: 9.3 X10(3)/MCL (ref 2.1–9.2)
NEUTROPHILS NFR BLD AUTO: 86 %
NRBC BLD AUTO-RTO: 0.3 %
PLATELET # BLD AUTO: 186 X10(3)/MCL (ref 130–400)
PMV BLD AUTO: 9.8 FL (ref 7.4–10.4)
POTASSIUM SERPL-SCNC: 4.3 MMOL/L (ref 3.5–5.1)
PROT SERPL-MCNC: 6.5 GM/DL (ref 5.8–7.6)
RBC # BLD AUTO: 3.13 X10(6)/MCL (ref 4.2–5.4)
SODIUM SERPL-SCNC: 142 MMOL/L (ref 136–145)
WBC # SPEC AUTO: 10.8 X10(3)/MCL (ref 4.5–11.5)

## 2022-10-12 PROCEDURE — 3288F PR FALLS RISK ASSESSMENT DOCUMENTED: ICD-10-PCS | Mod: CPTII,,, | Performed by: NURSE PRACTITIONER

## 2022-10-12 PROCEDURE — 36415 COLL VENOUS BLD VENIPUNCTURE: CPT

## 2022-10-12 PROCEDURE — 1159F MED LIST DOCD IN RCRD: CPT | Mod: CPTII,,, | Performed by: NURSE PRACTITIONER

## 2022-10-12 PROCEDURE — 96415 CHEMO IV INFUSION ADDL HR: CPT

## 2022-10-12 PROCEDURE — 3074F SYST BP LT 130 MM HG: CPT | Mod: CPTII,,, | Performed by: NURSE PRACTITIONER

## 2022-10-12 PROCEDURE — 99214 OFFICE O/P EST MOD 30 MIN: CPT | Mod: S$PBB,,, | Performed by: NURSE PRACTITIONER

## 2022-10-12 PROCEDURE — 3078F DIAST BP <80 MM HG: CPT | Mod: CPTII,,, | Performed by: NURSE PRACTITIONER

## 2022-10-12 PROCEDURE — 63600175 PHARM REV CODE 636 W HCPCS: Performed by: INTERNAL MEDICINE

## 2022-10-12 PROCEDURE — 25000003 PHARM REV CODE 250: Performed by: INTERNAL MEDICINE

## 2022-10-12 PROCEDURE — 99213 OFFICE O/P EST LOW 20 MIN: CPT | Mod: PBBFAC,25 | Performed by: NURSE PRACTITIONER

## 2022-10-12 PROCEDURE — 3074F PR MOST RECENT SYSTOLIC BLOOD PRESSURE < 130 MM HG: ICD-10-PCS | Mod: CPTII,,, | Performed by: NURSE PRACTITIONER

## 2022-10-12 PROCEDURE — 1101F PR PT FALLS ASSESS DOC 0-1 FALLS W/OUT INJ PAST YR: ICD-10-PCS | Mod: CPTII,,, | Performed by: NURSE PRACTITIONER

## 2022-10-12 PROCEDURE — 96413 CHEMO IV INFUSION 1 HR: CPT

## 2022-10-12 PROCEDURE — 80053 COMPREHEN METABOLIC PANEL: CPT

## 2022-10-12 PROCEDURE — 3078F PR MOST RECENT DIASTOLIC BLOOD PRESSURE < 80 MM HG: ICD-10-PCS | Mod: CPTII,,, | Performed by: NURSE PRACTITIONER

## 2022-10-12 PROCEDURE — 83735 ASSAY OF MAGNESIUM: CPT

## 2022-10-12 PROCEDURE — 1101F PT FALLS ASSESS-DOCD LE1/YR: CPT | Mod: CPTII,,, | Performed by: NURSE PRACTITIONER

## 2022-10-12 PROCEDURE — 1159F PR MEDICATION LIST DOCUMENTED IN MEDICAL RECORD: ICD-10-PCS | Mod: CPTII,,, | Performed by: NURSE PRACTITIONER

## 2022-10-12 PROCEDURE — 99214 PR OFFICE/OUTPT VISIT, EST, LEVL IV, 30-39 MIN: ICD-10-PCS | Mod: S$PBB,,, | Performed by: NURSE PRACTITIONER

## 2022-10-12 PROCEDURE — 3288F FALL RISK ASSESSMENT DOCD: CPT | Mod: CPTII,,, | Performed by: NURSE PRACTITIONER

## 2022-10-12 PROCEDURE — 96375 TX/PRO/DX INJ NEW DRUG ADDON: CPT

## 2022-10-12 RX ORDER — SODIUM CHLORIDE 0.9 % (FLUSH) 0.9 %
10 SYRINGE (ML) INJECTION
Status: DISCONTINUED | OUTPATIENT
Start: 2022-10-12 | End: 2022-10-12 | Stop reason: HOSPADM

## 2022-10-12 RX ORDER — FAMOTIDINE 10 MG/ML
20 INJECTION INTRAVENOUS
Status: COMPLETED | OUTPATIENT
Start: 2022-10-12 | End: 2022-10-12

## 2022-10-12 RX ORDER — DIPHENHYDRAMINE HYDROCHLORIDE 50 MG/ML
50 INJECTION INTRAMUSCULAR; INTRAVENOUS
Status: COMPLETED | OUTPATIENT
Start: 2022-10-12 | End: 2022-10-12

## 2022-10-12 RX ORDER — DIPHENHYDRAMINE HYDROCHLORIDE 50 MG/ML
50 INJECTION INTRAMUSCULAR; INTRAVENOUS ONCE AS NEEDED
Status: DISCONTINUED | OUTPATIENT
Start: 2022-10-12 | End: 2022-10-12 | Stop reason: HOSPADM

## 2022-10-12 RX ORDER — ONDANSETRON HYDROCHLORIDE 8 MG/1
8 TABLET, FILM COATED ORAL EVERY 8 HOURS PRN
Qty: 30 TABLET | Refills: 2 | Status: SHIPPED | OUTPATIENT
Start: 2022-10-12 | End: 2023-10-12

## 2022-10-12 RX ORDER — HEPARIN 100 UNIT/ML
500 SYRINGE INTRAVENOUS
Status: DISCONTINUED | OUTPATIENT
Start: 2022-10-12 | End: 2022-10-12 | Stop reason: HOSPADM

## 2022-10-12 RX ORDER — EPINEPHRINE 0.3 MG/.3ML
0.3 INJECTION SUBCUTANEOUS ONCE AS NEEDED
Status: DISCONTINUED | OUTPATIENT
Start: 2022-10-12 | End: 2022-10-12 | Stop reason: HOSPADM

## 2022-10-12 RX ADMIN — PACLITAXEL 312 MG: 6 INJECTION, SOLUTION INTRAVENOUS at 12:10

## 2022-10-12 RX ADMIN — DIPHENHYDRAMINE HYDROCHLORIDE 50 MG: 50 INJECTION INTRAMUSCULAR; INTRAVENOUS at 12:10

## 2022-10-12 RX ADMIN — FAMOTIDINE 20 MG: 10 INJECTION, SOLUTION INTRAVENOUS at 12:10

## 2022-10-12 RX ADMIN — DEXAMETHASONE SODIUM PHOSPHATE 0.25 MG: 4 INJECTION, SOLUTION INTRA-ARTICULAR; INTRALESIONAL; INTRAMUSCULAR; INTRAVENOUS; SOFT TISSUE at 12:10

## 2022-10-12 NOTE — PROGRESS NOTES
Past medical history: Obesity.  Tobacco abuse.   -02/19/2020: Acute nonocclusive DVT in the right distal femoral vein; acute occlusive DVTs right popliteal and right peroneal veins ((she never followed up on that)  Procedure/surgical history: Cholecystectomy (2001).  Bilateral tubal ligation (according to her).  Social history: .  Lives in [place], [place].  Has 2 children.  Children.  Does not work.  Smoked half a pack of cigarettes daily for 40 years; quit 2 weeks ago.  No alcohol or illicit drugs.  Family history: No family history of cancer.  Health maintenance: According to her, screening colonoscopy 5 years ago at [facility], probably revealing a benign polyp.  Menstrual and OB/GYN history: Menarche, age 13.  Menopause, age 59.  G2, P2.  No abortions or miscarriages.  Age at delivery of first child, 22.  Did not breast-feed her children.  Used birth control pills for 1 year, subsequently, bilateral tubal ligation.  No history of hormone replacement therapy.    Reason for follow-up:  -IDC right breast, presumed triple negative, AJCC stage IIB, clinical prognostic stage IIIB  -History of right lower extremity DVT    Treatment history  -Neoadjuvant ddAC x4 (04/13/2022-06/02/2022)  -Neoadjuvant dose dense Taxol started (08/31/2022 - 10/12/22)    History of present illness:  66-year-old lady referred from surgery, with breast cancer; currently on dose dense Taxol therapy.     She initially presented to emergency department 11/15/2021 for evaluation of breast pain and a palpable lump in the right breast.  Subsequent imaging studies and biopsy established diagnosis of invasive ductal carcinoma of right breast, axillary lymph node positive, ER low positive, [place] negative, and HER-2 negative.    Today, 10/12/22; seen in follow up; unaccompanied. Due for cycle 4 dd Taxol today. Eating ok; she notes her energy level comes and goes as well as her diarrhea. Neuropathy symptoms to the tips of her thumb and two index  fingers, no neuropathy symptoms in her feet. No trouble with motor or sensory function. No CP, palpitations. Breathing stable. No fevers, nausea, vomiting. No pain complaints. Staying active in her home setting. Walking. Denies other significant problems or concerns.     Review of systems  All systems reviewed, and found to be negative except for the symptoms detailed above.    Physical examination:  Vitals:    10/12/22 0939   BP: 127/73   Pulse: 72   Resp: 20   Temp: 98.6 °F (37 °C)     General: Alert and oriented. No acute distress  Eye: Pupils are equal, round and reactive to light, Extraocular movements are intact. Normal conjunctiva  HENT: Normocephalic. Oropharynx exam deferred; mask in place due to coronavirus  Neck: Supple, Non-tender  Respiratory: Respirations are non-labored, Symmetrical chest wall expansion. Breath sounds CTA bilaterally  Cardiovascular: Regular rate, rhythm, Normal peripheral perfusion, No bilateral lower extremity edema  Breast: Exam deferred  Gastrointestinal: Non-distended, Present bowel sounds   GYN: Exam deferred  Genitourinary: Exam deferred  Lymphatics: No lymphadenopathy appreciated  Musculoskeletal: Moves all extremities  Integumentary: Intact. Warm, dry. No rashes, or lesions to visible skin  Neurologic: No focal deficits  Psychiatric: Cooperative. Appropriate mood and affect     Assessment:  1.) IDC right breast, practically triple negative:  -IDC right breast, practically triple negative (ER 1%, low +; CT negative; HER2 negative)  -right breast mass noted 02/2020; she did not follow-up; biopsy 01/07/2022  -ER low + (1%).  CT negative (0%).  HER2 negative.  Ki-67 high proliferation (50%).  -3.1 cm mass on mammogram.  Grade 3. Right axillary lymph node biopsy +  -cT2 cN1 MX, grade 3, practically triple negative, AJCC anatomic stage IIB, clinical prognostic stage IIIB  -s/p neoadjuvant ddAC x4 (04/13/2022-06/02/2022), with some progression on restaging mammogram and ultrasound  06/17/2022  -suspicion of multiple punctate liver metastasis and bilateral adrenal metastasis on restaging CTs 07/15/2022  -neoadjuvant dose dense Taxol started 08/31/2022 - 10/12/2022    Development of liver metastases  -PET-CT 09/19/2020 2: 1.9 x 1.2 cm right liver metastasis    Molecular markers:  -breast biopsy (01/07/2022):    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 + (DAVID hi 62.1%)    Right hemithyroid lesion  -PET-CT 09/19/2020 2: 1.6 x 1.4 cm hypermetabolic low-density nodule posterior right hemithyroid, maximum SUV 4.6, every attenuation 39 HU  -Thyroid ultrasound on 10/17/22 for further evaluation of thyroid nodules     History of right lower extremity DVT 02/19/2020:  -02/19/2020: (Right leg swelling for 3 days) acute nonocclusive DVT in the right distal femoral vein; acute occlusive DVTs right popliteal and right peroneal veins  -Apparently, at the same time, was also noted to have a breast lump (right breast, 3:00, a small lump, nontender, slightly irregular on palpation, not hard or fluctuant, no erythema)  -Apparently, [facility] ambulatory clinics tried to contact her but without any response      -Plan:  -s/p neoadjuvant ddAC x4 (04/13/2022-06/02/2022), with some progression on restaging mammogram and ultrasound 06/17/2022   -suspicion of multiple punctate liver metastases and bilateral adrenal metastases on restaging CTs 07/15/2022  -s/p neoadjuvant dose dense Taxol: 8/31/22 - 10/12/22     -Pending IR for image guided biopsy of PET + liver metastasis (1.9 x 1.2 cm, maximum SUV 6.4)  -IR declined biopsy of suspicious adrenal nodules (on PET-CT, low suspicion of adrenal metastasis)    -Despite the fact that ER is low positive (1%), regardless, in due course,   will need adjuvant endocrine therapy  -Since ER positive (low  positive), HER-2 negative, at least 1 axillary lymph node positive, grade 3 disease, and Ki-67 score >20%, therefore, in due course, will benefit from   2 years of adjuvant abemaciclib in combination with endocrine therapy   -Since this tumor may behave more like triple negative breast cancer, therefore, we have   ordered genetic testing and counseling     -If no metastasis, then, after neoadjuvant chemotherapy, will need BCS or mastectomy and surgical axillary staging.  -Right axillary lymph node was biopsy +, therefore, whether she undergoes lumpectomy or mastectomy, will need adjuvant radiotherapy     If no metastasis, then, adjuvant systemic therapy after preoperative systemic therapy:   1.  Consider adjuvant bisphosphonate therapy for risk reduction of distant metastasis for 3-5 years in postmenopausal patients with high risk node-negative or node positive tumors   >> Will offer her zoledronic acid 4 mg IV every 6 months x3-5 years in the adjuvant setting;   2.  Adjuvant endocrine therapy appropriate for ER positive postmenopausal patient;   3.  Adjuvant olaparib if germline BRCA1/2 mutation positive and residual disease after preoperative therapy and a clinical stage, pathologic stage, estrogen receptor status, and tumor grade (CPS+EG) = 3 or >3 (tumor is ER positive, HER-2 negative)   (If patient qualifies for olaparib, then, olaparib can be used concurrently with endocrine therapy but only after adjuvant radiotherapy)   4.  This patient has at least 1 axillary lymph node positive; has grade 3 disease; Ki-67 score is 50%; therefore, 2 years of adjuvant abemaciclib can be considered in combination with endocrine therapy     -Reviewed / discussed labs; clinically ok to proceed with last treatment of dd Taxol today; growth factor tomorrow   -Pending repeat diagnostic mammogram and ultrasound of breast to assess response, now that she has completed 4 cycles of neoadjuvant Taxol  -10/21/22 MR Abdomen for further  evaluation of liver   -11/21/22 Genetics consult   -Will need MD follow up for discussion re: results of path from liver biopsy when completed; biopsy pending  -Labs, NP visit for reassessment in 2 weeks   -For now plan for follow up, labs, MD visit in 6 weeks        Above discussed at length with the patient.  All questions answered.   She understands and agrees this plan.

## 2022-10-13 ENCOUNTER — INFUSION (OUTPATIENT)
Dept: INFUSION THERAPY | Facility: HOSPITAL | Age: 67
End: 2022-10-13
Attending: INTERNAL MEDICINE
Payer: MEDICAID

## 2022-10-13 VITALS
DIASTOLIC BLOOD PRESSURE: 68 MMHG | TEMPERATURE: 98 F | SYSTOLIC BLOOD PRESSURE: 131 MMHG | HEART RATE: 90 BPM | RESPIRATION RATE: 20 BRPM | OXYGEN SATURATION: 100 %

## 2022-10-13 DIAGNOSIS — C50.011 MALIGNANT NEOPLASM OF NIPPLE OF RIGHT BREAST IN FEMALE, UNSPECIFIED ESTROGEN RECEPTOR STATUS: Primary | ICD-10-CM

## 2022-10-13 PROCEDURE — 63600175 PHARM REV CODE 636 W HCPCS: Mod: TB | Performed by: INTERNAL MEDICINE

## 2022-10-13 PROCEDURE — 96372 THER/PROPH/DIAG INJ SC/IM: CPT

## 2022-10-13 RX ADMIN — PEGFILGRASTIM-CBQV 6 MG: 6 INJECTION, SOLUTION SUBCUTANEOUS at 02:10

## 2022-10-17 ENCOUNTER — HOSPITAL ENCOUNTER (OUTPATIENT)
Dept: RADIOLOGY | Facility: HOSPITAL | Age: 67
Discharge: HOME | End: 2022-10-17
Attending: INTERNAL MEDICINE
Payer: MEDICAID

## 2022-10-17 DIAGNOSIS — C78.7 LIVER METASTASES: ICD-10-CM

## 2022-10-17 DIAGNOSIS — C50.011 MALIGNANT NEOPLASM OF NIPPLE OF RIGHT BREAST IN FEMALE, UNSPECIFIED ESTROGEN RECEPTOR STATUS: ICD-10-CM

## 2022-10-17 PROCEDURE — 76536 US EXAM OF HEAD AND NECK: CPT | Mod: TC

## 2022-10-21 ENCOUNTER — HOSPITAL ENCOUNTER (OUTPATIENT)
Dept: RADIOLOGY | Facility: HOSPITAL | Age: 67
Discharge: HOME | End: 2022-10-21
Attending: INTERNAL MEDICINE
Payer: MEDICAID

## 2022-10-21 DIAGNOSIS — C50.011 MALIGNANT NEOPLASM OF NIPPLE OF RIGHT BREAST IN FEMALE, UNSPECIFIED ESTROGEN RECEPTOR STATUS: ICD-10-CM

## 2022-10-21 DIAGNOSIS — E27.8 ADRENAL NODULE: ICD-10-CM

## 2022-10-21 PROCEDURE — A9577 INJ MULTIHANCE: HCPCS

## 2022-10-21 PROCEDURE — 25500020 PHARM REV CODE 255

## 2022-10-21 PROCEDURE — 74183 MRI ABD W/O CNTR FLWD CNTR: CPT | Mod: TC

## 2022-10-21 RX ADMIN — GADOBENATE DIMEGLUMINE 14 ML: 529 INJECTION, SOLUTION INTRAVENOUS at 10:10

## 2022-10-24 ENCOUNTER — TELEPHONE (OUTPATIENT)
Dept: INTERVENTIONAL RADIOLOGY/VASCULAR | Facility: HOSPITAL | Age: 67
End: 2022-10-24
Payer: MEDICAID

## 2022-10-24 NOTE — TELEPHONE ENCOUNTER
This referral was reviewed by Dr. Anderson and he states that the lung nodules in question are too small to biopsy right now but he is concerned that they are mets. Would advise continued f/u.

## 2022-10-27 ENCOUNTER — OFFICE VISIT (OUTPATIENT)
Dept: HEMATOLOGY/ONCOLOGY | Facility: CLINIC | Age: 67
End: 2022-10-27
Payer: MEDICAID

## 2022-10-27 VITALS
WEIGHT: 139 LBS | SYSTOLIC BLOOD PRESSURE: 124 MMHG | OXYGEN SATURATION: 100 % | TEMPERATURE: 98 F | BODY MASS INDEX: 23.73 KG/M2 | DIASTOLIC BLOOD PRESSURE: 71 MMHG | HEIGHT: 64 IN | RESPIRATION RATE: 20 BRPM | HEART RATE: 66 BPM

## 2022-10-27 DIAGNOSIS — E04.1 RIGHT THYROID NODULE: ICD-10-CM

## 2022-10-27 DIAGNOSIS — C50.911 MALIGNANT NEOPLASM OF RIGHT FEMALE BREAST, UNSPECIFIED ESTROGEN RECEPTOR STATUS, UNSPECIFIED SITE OF BREAST: Primary | ICD-10-CM

## 2022-10-27 DIAGNOSIS — C78.7 LIVER METASTASES: ICD-10-CM

## 2022-10-27 DIAGNOSIS — E27.8 ADRENAL NODULE: ICD-10-CM

## 2022-10-27 DIAGNOSIS — Z51.11 ENCOUNTER FOR CHEMOTHERAPY MANAGEMENT: ICD-10-CM

## 2022-10-27 DIAGNOSIS — Z17.1 MALIGNANT NEOPLASM OF RIGHT BREAST IN FEMALE, ESTROGEN RECEPTOR NEGATIVE, UNSPECIFIED SITE OF BREAST: Primary | ICD-10-CM

## 2022-10-27 DIAGNOSIS — C50.911 MALIGNANT NEOPLASM OF RIGHT BREAST IN FEMALE, ESTROGEN RECEPTOR NEGATIVE, UNSPECIFIED SITE OF BREAST: Primary | ICD-10-CM

## 2022-10-27 PROCEDURE — 3074F PR MOST RECENT SYSTOLIC BLOOD PRESSURE < 130 MM HG: ICD-10-PCS | Mod: CPTII,,, | Performed by: NURSE PRACTITIONER

## 2022-10-27 PROCEDURE — 3288F PR FALLS RISK ASSESSMENT DOCUMENTED: ICD-10-PCS | Mod: CPTII,,, | Performed by: NURSE PRACTITIONER

## 2022-10-27 PROCEDURE — 99214 OFFICE O/P EST MOD 30 MIN: CPT | Mod: S$PBB,,, | Performed by: NURSE PRACTITIONER

## 2022-10-27 PROCEDURE — 1101F PR PT FALLS ASSESS DOC 0-1 FALLS W/OUT INJ PAST YR: ICD-10-PCS | Mod: CPTII,,, | Performed by: NURSE PRACTITIONER

## 2022-10-27 PROCEDURE — 3078F DIAST BP <80 MM HG: CPT | Mod: CPTII,,, | Performed by: NURSE PRACTITIONER

## 2022-10-27 PROCEDURE — 99214 PR OFFICE/OUTPT VISIT, EST, LEVL IV, 30-39 MIN: ICD-10-PCS | Mod: S$PBB,,, | Performed by: NURSE PRACTITIONER

## 2022-10-27 PROCEDURE — 1159F PR MEDICATION LIST DOCUMENTED IN MEDICAL RECORD: ICD-10-PCS | Mod: CPTII,,, | Performed by: NURSE PRACTITIONER

## 2022-10-27 PROCEDURE — 1159F MED LIST DOCD IN RCRD: CPT | Mod: CPTII,,, | Performed by: NURSE PRACTITIONER

## 2022-10-27 PROCEDURE — 99213 OFFICE O/P EST LOW 20 MIN: CPT | Mod: PBBFAC | Performed by: NURSE PRACTITIONER

## 2022-10-27 PROCEDURE — 1101F PT FALLS ASSESS-DOCD LE1/YR: CPT | Mod: CPTII,,, | Performed by: NURSE PRACTITIONER

## 2022-10-27 PROCEDURE — 3074F SYST BP LT 130 MM HG: CPT | Mod: CPTII,,, | Performed by: NURSE PRACTITIONER

## 2022-10-27 PROCEDURE — 3288F FALL RISK ASSESSMENT DOCD: CPT | Mod: CPTII,,, | Performed by: NURSE PRACTITIONER

## 2022-10-27 PROCEDURE — 3078F PR MOST RECENT DIASTOLIC BLOOD PRESSURE < 80 MM HG: ICD-10-PCS | Mod: CPTII,,, | Performed by: NURSE PRACTITIONER

## 2022-10-27 NOTE — PROGRESS NOTES
Past medical history: Obesity.  Tobacco abuse.   -02/19/2020: Acute nonocclusive DVT in the right distal femoral vein; acute occlusive DVTs right popliteal and right peroneal veins ((she never followed up on that)  Procedure/surgical history: Cholecystectomy (2001).  Bilateral tubal ligation (according to her).  Social history: .  Lives in [place], [place].  Has 2 children.  Children.  Does not work.  Smoked half a pack of cigarettes daily for 40 years; quit 2 weeks ago.  No alcohol or illicit drugs.  Family history: No family history of cancer.  Health maintenance: According to her, screening colonoscopy 5 years ago at [facility], probably revealing a benign polyp.  Menstrual and OB/GYN history: Menarche, age 13.  Menopause, age 59.  G2, P2.  No abortions or miscarriages.  Age at delivery of first child, 22.  Did not breast-feed her children.  Used birth control pills for 1 year, subsequently, bilateral tubal ligation.  No history of hormone replacement therapy.    Reason for follow-up:  -IDC right breast, presumed triple negative, AJCC stage IIB, clinical prognostic stage IIIB  -History of right lower extremity DVT    Treatment history  -Neoadjuvant ddAC x4 (04/13/2022-06/02/2022)  -Neoadjuvant dose dense Taxol (08/31/2022 - 10/12/22)    History of present illness:  66-year-old lady referred from surgery, with breast cancer; currently on dose dense Taxol therapy.     She initially presented to emergency department 11/15/2021 for evaluation of breast pain and a palpable lump in the right breast.  Subsequent imaging studies and biopsy established diagnosis of invasive ductal carcinoma of right breast, axillary lymph node positive, ER low positive, DE negative, and HER-2 negative.    Today, 10/27/22; seen in follow up; unaccompanied. Has completed dd Taxol therapy. Stable she notes in terms of how she's feeling with stable neuropathy symptoms. No CP, palpitations. Breathing stable. No fevers, nausea, vomiting.  No pain complaints. Staying active in her home setting. Walking. Denies other significant problems or concerns.     Review of systems  All systems reviewed, and found to be negative except for the symptoms detailed above.    Physical examination:  Vitals:    10/27/22 0903   BP: 124/71   Pulse: 66   Resp: 20   Temp: 98 °F (36.7 °C)     General: Alert and oriented. No acute distress  Eye: Pupils are equal, round and reactive to light, Extraocular movements are intact. Normal conjunctiva  HENT: Normocephalic. Oropharynx exam deferred; mask in place due to coronavirus  Neck: Supple, Non-tender  Respiratory: Respirations are non-labored, Symmetrical chest wall expansion. Breath sounds CTA bilaterally  Cardiovascular: Regular rate, rhythm, Normal peripheral perfusion, No bilateral lower extremity edema  Breast: Exam deferred  Gastrointestinal: Non-distended, Present bowel sounds   GYN: Exam deferred  Genitourinary: Exam deferred  Lymphatics: No lymphadenopathy appreciated  Musculoskeletal: Moves all extremities  Integumentary: Intact. Warm, dry. No rashes, or lesions to visible skin  Neurologic: No focal deficits  Psychiatric: Cooperative. Appropriate mood and affect     Assessment:  1.) IDC right breast, practically triple negative:  -IDC right breast, practically triple negative (ER 1%, low +; NC negative; HER2 negative)  -right breast mass noted 02/2020; she did not follow-up; biopsy 01/07/2022  -ER low + (1%).  NC negative (0%).  HER2 negative.  Ki-67 high proliferation (50%).  -3.1 cm mass on mammogram.  Grade 3. Right axillary lymph node biopsy +  -cT2 cN1 MX, grade 3, practically triple negative, AJCC anatomic stage IIB, clinical prognostic stage IIIB  -s/p neoadjuvant ddAC x4 (04/13/2022-06/02/2022), with some progression on restaging mammogram and ultrasound 06/17/2022  -suspicion of multiple punctate liver metastasis and bilateral adrenal metastasis on restaging CTs 07/15/2022  -neoadjuvant dose dense Taxol  started 08/31/2022 - 10/12/2022    Development of liver metastases  -PET-CT 09/19/2020 2: 1.9 x 1.2 cm right liver metastasis    Molecular markers:  -breast biopsy (01/07/2022):    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 + (DAVID hi 62.1%)    Right hemithyroid lesion  -PET-CT 09/19/2020 2: 1.6 x 1.4 cm hypermetabolic low-density nodule posterior right hemithyroid, maximum SUV 4.6, every attenuation 39 HU  -Thyroid ultrasound on 10/17/22 for further evaluation of thyroid nodules     History of right lower extremity DVT 02/19/2020:  -02/19/2020: (Right leg swelling for 3 days) acute nonocclusive DVT in the right distal femoral vein; acute occlusive DVTs right popliteal and right peroneal veins  -Apparently, at the same time, was also noted to have a breast lump (right breast, 3:00, a small lump, nontender, slightly irregular on palpation, not hard or fluctuant, no erythema)  -Apparently, [facility] ambulatory clinics tried to contact her but without any response      -Plan:  -s/p neoadjuvant ddAC x4 (04/13/2022-06/02/2022), with some progression on restaging mammogram and ultrasound 06/17/2022   -suspicion of multiple punctate liver metastases and bilateral adrenal metastases on restaging CTs 07/15/2022  -s/p neoadjuvant dose dense Taxol: 8/31/22 - 10/12/22     -Pending IR for image guided biopsy of PET + liver metastasis (1.9 x 1.2 cm, maximum SUV 6.4)  -IR declined biopsy of suspicious adrenal nodules (on PET-CT, low suspicion of adrenal metastasis)    -Despite the fact that ER is low positive (1%), regardless, in due course,   will need adjuvant endocrine therapy  -Since ER positive (low positive), HER-2 negative, at least 1 axillary lymph node positive, grade 3 disease, and Ki-67 score >20%, therefore, in due course, will benefit from   2  years of adjuvant abemaciclib in combination with endocrine therapy   -Since this tumor may behave more like triple negative breast cancer, therefore, we have   ordered genetic testing and counseling     -If no metastasis, then, after neoadjuvant chemotherapy, will need BCS or mastectomy and surgical axillary staging.  -Right axillary lymph node was biopsy +, therefore, whether she undergoes lumpectomy or mastectomy, will need adjuvant radiotherapy     If no metastasis, then, adjuvant systemic therapy after preoperative systemic therapy:   1.  Consider adjuvant bisphosphonate therapy for risk reduction of distant metastasis for 3-5 years in postmenopausal patients with high risk node-negative or node positive tumors   >> Will offer her zoledronic acid 4 mg IV every 6 months x3-5 years in the adjuvant setting;   2.  Adjuvant endocrine therapy appropriate for ER positive postmenopausal patient;   3.  Adjuvant olaparib if germline BRCA1/2 mutation positive and residual disease after preoperative therapy and a clinical stage, pathologic stage, estrogen receptor status, and tumor grade (CPS+EG) = 3 or >3 (tumor is ER positive, HER-2 negative)   (If patient qualifies for olaparib, then, olaparib can be used concurrently with endocrine therapy but only after adjuvant radiotherapy)   4.  This patient has at least 1 axillary lymph node positive; has grade 3 disease; Ki-67 score is 50%; therefore, 2 years of adjuvant abemaciclib can be considered in combination with endocrine therapy     -Reviewed / discussed labs; now completed neoadjuvant therapy  -Pending repeat diagnostic mammogram and ultrasound of breast to assess response; scheduled 11/4/22  -10/21/22 MR Abdomen revealing for 2 small right hepatic lobe lesions suspicious for metastases.  One of these was hypermetabolic on recent PET-CT. Bilateral adrenal adenomas relatively stable in appearance    -She is pending IR guided liver biopsy; will contact radiology  scheduling team about an appointment   -For now will plan follow up, labs, MD visit in 4 weeks assuming we can get biopsy done rather quickly   -11/21/22 Genetics consult        Above discussed at length with the patient.  All questions answered.   She understands and agrees this plan.

## 2022-11-04 ENCOUNTER — HOSPITAL ENCOUNTER (OUTPATIENT)
Dept: RADIOLOGY | Facility: HOSPITAL | Age: 67
Discharge: HOME | End: 2022-11-04
Attending: INTERNAL MEDICINE
Payer: MEDICAID

## 2022-11-04 ENCOUNTER — TELEPHONE (OUTPATIENT)
Dept: INTERVENTIONAL RADIOLOGY/VASCULAR | Facility: HOSPITAL | Age: 67
End: 2022-11-04

## 2022-11-04 ENCOUNTER — DOCUMENTATION ONLY (OUTPATIENT)
Dept: HEMATOLOGY/ONCOLOGY | Facility: CLINIC | Age: 67
End: 2022-11-04
Payer: MEDICAID

## 2022-11-04 DIAGNOSIS — E27.8 ADRENAL NODULE: ICD-10-CM

## 2022-11-04 DIAGNOSIS — C50.011 MALIGNANT NEOPLASM OF NIPPLE OF RIGHT BREAST IN FEMALE, UNSPECIFIED ESTROGEN RECEPTOR STATUS: ICD-10-CM

## 2022-11-04 PROCEDURE — 25500020 PHARM REV CODE 255

## 2022-11-04 PROCEDURE — 76642 ULTRASOUND BREAST LIMITED: CPT | Mod: 26,RT,, | Performed by: RADIOLOGY

## 2022-11-04 PROCEDURE — 76642 US BREAST RIGHT LIMITED: ICD-10-PCS | Mod: 26,RT,, | Performed by: RADIOLOGY

## 2022-11-04 PROCEDURE — 77066 DX MAMMO INCL CAD BI: CPT | Mod: 26,,, | Performed by: RADIOLOGY

## 2022-11-04 PROCEDURE — 77066 MAMMO DIGITAL DIAGNOSTIC WITH CONTRAST, BILATERAL: ICD-10-PCS | Mod: 26,,, | Performed by: RADIOLOGY

## 2022-11-04 PROCEDURE — 76642 ULTRASOUND BREAST LIMITED: CPT | Mod: TC,RT

## 2022-11-04 PROCEDURE — 77066 DX MAMMO INCL CAD BI: CPT | Mod: TC

## 2022-11-04 RX ADMIN — IOHEXOL 100 ML: 350 INJECTION, SOLUTION INTRAVENOUS at 12:11

## 2022-11-04 NOTE — TELEPHONE ENCOUNTER
Dr. Anderson reviewed this referral that we received from Dr. Hatfield for a liver biopsy and he states lesion too small to biopsy.

## 2022-11-04 NOTE — PROGRESS NOTES
Message from IR:    Dr. Anderson reviewed this referral for liver biopsy; according to him, lesion is too small for biopsy.

## 2022-11-10 ENCOUNTER — OFFICE VISIT (OUTPATIENT)
Dept: HEMATOLOGY/ONCOLOGY | Facility: CLINIC | Age: 67
End: 2022-11-10
Payer: MEDICAID

## 2022-11-10 VITALS
HEART RATE: 74 BPM | OXYGEN SATURATION: 100 % | WEIGHT: 137 LBS | RESPIRATION RATE: 20 BRPM | HEIGHT: 64 IN | DIASTOLIC BLOOD PRESSURE: 84 MMHG | TEMPERATURE: 98 F | SYSTOLIC BLOOD PRESSURE: 143 MMHG | BODY MASS INDEX: 23.39 KG/M2

## 2022-11-10 DIAGNOSIS — C50.911 MALIGNANT NEOPLASM OF RIGHT FEMALE BREAST, UNSPECIFIED ESTROGEN RECEPTOR STATUS, UNSPECIFIED SITE OF BREAST: Primary | ICD-10-CM

## 2022-11-10 DIAGNOSIS — Z51.11 ENCOUNTER FOR CHEMOTHERAPY MANAGEMENT: ICD-10-CM

## 2022-11-10 DIAGNOSIS — E27.8 ADRENAL NODULE: ICD-10-CM

## 2022-11-10 DIAGNOSIS — C78.7 LIVER METASTASES: ICD-10-CM

## 2022-11-10 DIAGNOSIS — E04.2 MULTIPLE THYROID NODULES: ICD-10-CM

## 2022-11-10 PROCEDURE — 3288F FALL RISK ASSESSMENT DOCD: CPT | Mod: CPTII,,, | Performed by: NURSE PRACTITIONER

## 2022-11-10 PROCEDURE — 3288F PR FALLS RISK ASSESSMENT DOCUMENTED: ICD-10-PCS | Mod: CPTII,,, | Performed by: NURSE PRACTITIONER

## 2022-11-10 PROCEDURE — 3008F PR BODY MASS INDEX (BMI) DOCUMENTED: ICD-10-PCS | Mod: CPTII,,, | Performed by: NURSE PRACTITIONER

## 2022-11-10 PROCEDURE — 3079F PR MOST RECENT DIASTOLIC BLOOD PRESSURE 80-89 MM HG: ICD-10-PCS | Mod: CPTII,,, | Performed by: NURSE PRACTITIONER

## 2022-11-10 PROCEDURE — 3077F SYST BP >= 140 MM HG: CPT | Mod: CPTII,,, | Performed by: NURSE PRACTITIONER

## 2022-11-10 PROCEDURE — 99213 OFFICE O/P EST LOW 20 MIN: CPT | Mod: PBBFAC | Performed by: NURSE PRACTITIONER

## 2022-11-10 PROCEDURE — 1159F PR MEDICATION LIST DOCUMENTED IN MEDICAL RECORD: ICD-10-PCS | Mod: CPTII,,, | Performed by: NURSE PRACTITIONER

## 2022-11-10 PROCEDURE — 3079F DIAST BP 80-89 MM HG: CPT | Mod: CPTII,,, | Performed by: NURSE PRACTITIONER

## 2022-11-10 PROCEDURE — 99214 PR OFFICE/OUTPT VISIT, EST, LEVL IV, 30-39 MIN: ICD-10-PCS | Mod: S$PBB,,, | Performed by: NURSE PRACTITIONER

## 2022-11-10 PROCEDURE — 1101F PT FALLS ASSESS-DOCD LE1/YR: CPT | Mod: CPTII,,, | Performed by: NURSE PRACTITIONER

## 2022-11-10 PROCEDURE — 1101F PR PT FALLS ASSESS DOC 0-1 FALLS W/OUT INJ PAST YR: ICD-10-PCS | Mod: CPTII,,, | Performed by: NURSE PRACTITIONER

## 2022-11-10 PROCEDURE — 1159F MED LIST DOCD IN RCRD: CPT | Mod: CPTII,,, | Performed by: NURSE PRACTITIONER

## 2022-11-10 PROCEDURE — 99214 OFFICE O/P EST MOD 30 MIN: CPT | Mod: S$PBB,,, | Performed by: NURSE PRACTITIONER

## 2022-11-10 PROCEDURE — 3077F PR MOST RECENT SYSTOLIC BLOOD PRESSURE >= 140 MM HG: ICD-10-PCS | Mod: CPTII,,, | Performed by: NURSE PRACTITIONER

## 2022-11-10 PROCEDURE — 3008F BODY MASS INDEX DOCD: CPT | Mod: CPTII,,, | Performed by: NURSE PRACTITIONER

## 2022-11-10 NOTE — PROGRESS NOTES
Past medical history: Obesity.  Tobacco abuse.   -02/19/2020: Acute nonocclusive DVT in the right distal femoral vein; acute occlusive DVTs right popliteal and right peroneal veins ((she never followed up on that)  Procedure/surgical history: Cholecystectomy (2001).  Bilateral tubal ligation (according to her).  Social history: .  Lives in [place], [place].  Has 2 children.  Children.  Does not work.  Smoked half a pack of cigarettes daily for 40 years; quit 2 weeks ago.  No alcohol or illicit drugs.  Family history: No family history of cancer.  Health maintenance: According to her, screening colonoscopy 5 years ago at [facility], probably revealing a benign polyp.  Menstrual and OB/GYN history: Menarche, age 13.  Menopause, age 59.  G2, P2.  No abortions or miscarriages.  Age at delivery of first child, 22.  Did not breast-feed her children.  Used birth control pills for 1 year, subsequently, bilateral tubal ligation.  No history of hormone replacement therapy.    Reason for follow-up:  -IDC right breast, presumed triple negative, AJCC stage IIB, clinical prognostic stage IIIB  -History of right lower extremity DVT    Treatment history  -Neoadjuvant ddAC x 4 (04/13/2022-06/02/2022)  -Neoadjuvant dose dense Taxol (08/31/2022 - 10/12/22)    History of present illness:  67 year-old lady referred from surgery, with breast cancer; currently on dose dense Taxol therapy.     She initially presented to emergency department 11/15/2021 for evaluation of breast pain and a palpable lump in the right breast.  Subsequent imaging studies and biopsy established diagnosis of invasive ductal carcinoma of right breast, axillary lymph node positive, ER low positive, MO negative, and HER-2 negative.    Today, 11/10/22; seen in follow up; unaccompanied. Has completed dd Taxol therapy. Stable in terms of how she's feeling. No CP, palpitations. Breathing stable. No fevers, nausea, vomiting. No pain complaints. Denies significant  problems or concerns.     Review of systems  All systems reviewed, and found to be negative except for the symptoms detailed above.    Physical examination:  Vitals:    11/10/22 1200   BP: (!) 143/84   Pulse: 74   Resp: 20   Temp: 98 °F (36.7 °C)     General: Alert and oriented. No acute distress  Eye: Pupils are equal, round and reactive to light, Extraocular movements are intact. Normal conjunctiva  HENT: Normocephalic. Oropharynx exam deferred; mask in place due to coronavirus  Neck: Supple, Non-tender  Respiratory: Respirations are non-labored, Symmetrical chest wall expansion. Breath sounds CTA bilaterally  Cardiovascular: Regular rate, rhythm, Normal peripheral perfusion, No bilateral lower extremity edema  Breast: Exam deferred  Gastrointestinal: Non-distended, Present bowel sounds   GYN: Exam deferred  Genitourinary: Exam deferred  Lymphatics: No lymphadenopathy appreciated  Musculoskeletal: Moves all extremities  Integumentary: Intact. Warm, dry. No rashes, or lesions to visible skin  Neurologic: No focal deficits  Psychiatric: Cooperative. Appropriate mood and affect     Assessment:  1.) IDC right breast, practically triple negative:  -IDC right breast, practically triple negative (ER 1%, low +; VA negative; HER2 negative)  -right breast mass noted 02/2020; she did not follow-up; biopsy 01/07/2022  -ER low + (1%).  VA negative (0%).  HER2 negative.  Ki-67 high proliferation (50%).  -3.1 cm mass on mammogram.  Grade 3. Right axillary lymph node biopsy +  -cT2 cN1 MX, grade 3, practically triple negative, AJCC anatomic stage IIB, clinical prognostic stage IIIB  -s/p neoadjuvant ddAC x4 (04/13/2022-06/02/2022), with some progression on restaging mammogram and ultrasound 06/17/2022  -suspicion of multiple punctate liver metastasis and bilateral adrenal metastasis on restaging CTs 07/15/2022  -neoadjuvant dose dense Taxol started 08/31/2022 - 10/12/2022    Development of liver metastases  -PET-CT 09/19/2020 2:  1.9 x 1.2 cm right liver metastasis; not amenable for biopsy per IR     Molecular markers:  -breast biopsy (01/07/2022):    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  Additional nodules do not meet biopsy criteria secondary to insufficient size/sonographic characteristics; recommendations for ultrasound surveillance provided above    History of right lower extremity DVT 02/19/2020:  -02/19/2020: (Right leg swelling for 3 days) acute nonocclusive DVT in the right distal femoral vein; acute occlusive DVTs right popliteal and right peroneal veins  -Apparently, at the same time, was also noted to have a breast lump (right breast, 3:00, a small lump, nontender, slightly irregular on palpation, not hard or fluctuant, no erythema)  -Apparently, [facility] ambulatory clinics tried to contact her but without any response      -Plan:  -s/p neoadjuvant ddAC x4 (04/13/2022-06/02/2022), with some progression on restaging mammogram and ultrasound 06/17/2022   -suspicion of multiple punctate liver metastases and bilateral adrenal metastases on restaging CTs 07/15/2022  -s/p neoadjuvant dose dense Taxol: 8/31/22 - 10/12/22     -Requested IR for image guided biopsy of PET + liver metastasis (1.9 x 1.2 cm, maximum SUV 6.4);   -IR declined biopsy of suspicious adrenal nodules (on PET-CT, low suspicion of adrenal metastasis)  -Per IR liver lesion too small for  biopsy     -Despite the fact that ER is low positive (1%), regardless, in due course,   will need adjuvant endocrine therapy  -Since ER positive (low positive), HER-2 negative, at least 1 axillary lymph node positive, grade 3 disease, and Ki-67 score >20%, therefore, in due course, will benefit from   2 years of adjuvant abemaciclib in combination with endocrine therapy   -Since this tumor may behave more like triple negative breast cancer, therefore, we have   ordered genetic testing and counseling     -If no metastasis, then, after neoadjuvant chemotherapy, will need BCS or mastectomy and surgical axillary staging.  -Right axillary lymph node was biopsy +, therefore, whether she undergoes lumpectomy or mastectomy, will need adjuvant radiotherapy     If no metastasis, then, adjuvant systemic therapy after preoperative systemic therapy:   1.  Consider adjuvant bisphosphonate therapy for risk reduction of distant metastasis for 3-5 years in postmenopausal patients with high risk node-negative or node positive tumors   >> Will offer her zoledronic acid 4 mg IV every 6 months x3-5 years in the adjuvant setting;   2.  Adjuvant endocrine therapy appropriate for ER positive postmenopausal patient;   3.  Adjuvant olaparib if germline BRCA1/2 mutation positive and residual disease after preoperative therapy and a clinical stage, pathologic stage, estrogen receptor status, and tumor grade (CPS+EG) = 3 or >3 (tumor is ER positive, HER-2 negative)   (If patient qualifies for olaparib, then, olaparib can be used concurrently with endocrine therapy but only after adjuvant radiotherapy)   4.  This patient has at least 1 axillary lymph node positive; has grade 3 disease; Ki-67 score is 50%; therefore, 2 years of adjuvant abemaciclib can be considered in combination with endocrine therapy     -Reviewed / discussed labs; s/p completion of neoadjuvant chemotherapy  -Repeat diagnostic mammogram / ultrasound of breast to assess  response on 11/4/22 with interval development of at least two adjacent satellite lesions in the upper outer right breast, and interval progression in associated calcifications  -11/21/22 Genetics consult   -ENT Referral for further evaluation of thyroid nodules     -Defer to Dr. Hatfield for further discussion of MMG/US findings as noted above and treatment plan; follow up in 2 wks         Above discussed at length with the patient.  All questions answered.   She understands and agrees this plan.

## 2022-11-25 PROBLEM — I82.5Z1 CHRONIC DEEP VEIN THROMBOSIS (DVT) OF DISTAL VEIN OF RIGHT LOWER EXTREMITY: Status: ACTIVE | Noted: 2022-11-25

## 2022-11-25 PROBLEM — E04.2 MULTINODULAR GOITER: Status: ACTIVE | Noted: 2022-11-25

## 2022-11-26 NOTE — PROGRESS NOTES
History:  Past Medical History:   Diagnosis Date    Breast cancer    Past medical history: Obesity.  Tobacco abuse.   -2020: Acute nonocclusive DVT in the right distal femoral vein; acute occlusive DVTs right popliteal and right peroneal veins ((she never followed up on that)  Procedure/surgical history: Cholecystectomy ().  Bilateral tubal ligation (according to her).  Social history: .  Lives in [place], [place].  Has 2 children.  Children.  Does not work.  Smoked half a pack of cigarettes daily for 40 years; quit 2 weeks ago.  No alcohol or illicit drugs.  Family history: No family history of cancer.  Health maintenance: According to her, screening colonoscopy 5 years ago at [facility], probably revealing a benign polyp.  Menstrual and OB/GYN history: Menarche, age 13.  Menopause, age 59.  G2, P2.  No abortions or miscarriages.  Age at delivery of first child, 22.  Did not breast-feed her children.  Used birth control pills for 1 year, subsequently, bilateral tubal ligation.  No history of hormone replacement therapy.  Past Surgical History:   Procedure Laterality Date    BREAST BIOPSY      CHOLECYSTECTOMY        Social History     Socioeconomic History    Marital status:    Tobacco Use    Smoking status: Former     Types: Cigarettes     Quit date: 3/5/2022     Years since quittin.7    Smokeless tobacco: Never   Substance and Sexual Activity    Alcohol use: Not Currently    Drug use: Never    Sexual activity: Yes      History reviewed. No pertinent family history.     Reason for Follow-up:   -IDC right breast, axillary lymph node biopsy +, presumed triple negative, AJCC stage IIB, clinical prognostic stage IIIB  -liver lesions; adrenal lesions   -History of right lower extremity DVT    -right thyroid lesion       History of Present Illness:   Breast Cancer        Oncologic/Hematologic History:  Oncology History   Breast cancer, right   2022 Cancer Staged    Staging form: Breast, AJCC  8th Edition  - Clinical stage from 1/7/2022: Stage IIIA (cT2, cN1, cM0, G3, ER+, HI-, HER2-)       4/7/2022 Initial Diagnosis    Breast cancer     4/13/2022 - 6/3/2022 Chemotherapy    Treatment Summary   Plan Name: OP BREAST DOSE-DENSE AC - DOXORUBICIN CYCLOPHOSPHAMIDE Q2W  Treatment Goal: Control  Status: Inactive  Start Date: 4/13/2022  End Date: 6/3/2022  Provider: Gurmeet Hatfield MD  Chemotherapy: DOXOrubicin chemo injection 112 mg, 60 mg/m2 = 112 mg, Intravenous, Clinic/HOD 1 time, 4 of 4 cycles  Administration: 112 mg (5/5/2022), 112 mg (5/19/2022), 112 mg (6/2/2022)  cyclophosphamide 600 mg/m2 = 1,120 mg in sodium chloride 0.9% 250 mL chemo infusion, 600 mg/m2 = 1,120 mg, Intravenous, Clinic/HOD 1 time, 4 of 4 cycles  Administration: 1,100 mg (5/5/2022), 1,120 mg (5/19/2022), 1,100 mg (6/2/2022)       8/31/2022 -  Chemotherapy    Treatment Summary   Plan Name: OP BREAST PACLITAXEL Q2W  Treatment Goal: Control  Status: Active  Start Date: 8/31/2022  End Date: 10/13/2022  Provider: Gurmeet Hatfield MD  Chemotherapy: PACLitaxeL (TAXOL) 175 mg/m2 = 312 mg in sodium chloride 0.9% 500 mL chemo infusion, 175 mg/m2 = 312 mg, Intravenous, Clinic/HOD 1 time, 4 of 4 cycles  Administration: 312 mg (8/31/2022), 312 mg (9/14/2022), 312 mg (9/28/2022), 312 mg (10/12/2022)        66-year-old lady referred from surgery, with breast cancer.     She initially presented to emergency department 11/15/2021 for evaluation of breast pain and a palpable lump in the right breast.  Subsequent imaging studies and biopsy established diagnosis of invasive ductal carcinoma of right breast, axillary lymph node positive, ER low positive, HI negative, and HER-2 negative.    Oncologic history:   #IDC right breast, presumed triple negative:   -History of right lower extremity DVT and right breast mass 02/2020; she did not follow-up   -Presentation: 11/2021: Palpable mass   -01/07/2022: Bilateral diagnostic mammogram with contrast and  limited ultrasound right breast   -01/07/2022: Biopsy   -3.1 cm mass on mammogram, overall grade 3, right axillary lymph node biopsy positive   -ER low positive (1%).  [place] negative (0%).  HER-2 negative by FISH.  Ki-67 high proliferation (50%)   >>>   For the purpose of neoadjuvant chemotherapy, since ER is low positive, we will treat the patient as if triple negative   >>>  -cT2 cN1 MX, grade 3, presumed triple negative, AJCC anatomic stage IIB, clinical prognostic stage IIIB   -02/22/2022: DEXA scan: Normal BMD   -02/22/2022: CT C/A/P with contrast for staging: Right breast mass with right axillary lymphadenopathy; bilateral adrenal nodules, statistically representing adenomas   -02/22/2022: Whole-body nuclear medicine bone scan: No bone metastasis   -03/02/2022: Mediport placed   -03/11/2022: TTE: LVEF 55-60%  -neoadjuvant DD AC started 04/13/2022; severe neutropenia, ANC 0.03, on 04/26/2022; therefore, cycle 2 held; treated with growth factor and prophylactic ciprofloxacin  -cycle 2 on 05/05/2022; cycle 3 on 05/19/2022; cycle 4 on 06/02/2022   -some progression on restaging mammogram and ultrasound 06/17/2022  -06/21/2022:  She was supposed to start neoadjuvant dose dense Taxol but was sent to emergency department for evaluation because she was feeling extremely weak, sick, unable to walk for 2-3 weeks  -06/21/2022:  WBC, differential unremarkable.  Hemoglobin 9.2, stable.  Platelets 210 K. ANC 5.6.  CMP stable and within acceptable limits.  Magnesium normal.  BNP normal.  TSH 2.2180, normal.    -06/21/2022:  V/Q scan:  Normal/very low probability of pulmonary embolism  -07/15/2022:  CT C/A/P with contrast (comparison:  02/22/2022):   Mass in the right breast with associated skin thickening in the right breast overall slightly less prominent than the prior examination.  The right axillary lymphadenopathy is also less prominent than the prior examination   Interval development of multiple punctate hypoattenuating  areas within the liver concerning for possible developing metastatic foci.  Short-term follow-up is recommended.  These nodules are too small to characterize and are all subcentimeter in size.   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  CA 27.29 level normal.  CA 15-3 level normal.  -neoadjuvant dose dense Taxol started 08/31/2022  -neoadjuvant dose dense Taxol: Cycle 1 (08/31/2022); cycle 2 (09/14/2022)  -09/09/2022: Whole-body nuclear medicine bone scan (comparison:  Bone scan 02/22/2022; CT C/A/P 0 07/15/2022): No bone metastasis  -09/19/2022: PET-CT to rule out metastatic disease (comparison:  09/09/2022 bone scan; 07/15/2022 CT C/A/P):  1. Slight interval enlargement of hypermetabolic right breast mass and associated right axillary node.  2. Metastatic right hepatic lesion (central aspect of right liver, 1.9 x 1.2 cm), with no other definite FDG-avid or aggressive appearing process through the neck, chest, abdomen, or pelvis.  3. Subcentimeter [place] foci are less conspicuous and again of limited characterization due to size and non-contrast protocol, but statistically favored to represent benign cysts.  Close attention on surveillance imaging is needed in order to ensure ongoing stability.  4. Incidental right adrenal adenoma (1.7 x 4.3 cm)  5. Diffuse osseous uptake is  favored to reflect sequela of systemic therapeutic agent.  6. Hypermetabolic low-density nodule posterior right hemithyroid, 1.6 x 1.4 cm, maximum SUV 4.6, every -neoadjuvant dose dense Taxol:  Cycle 3 (09/28/2022); cycle 4 (10/12/2022)  -10/17/2022:  Thyroid ultrasound:  1. Multinodular thyroid.  2. The dominant right upper thyroid pole nodule previously demonstrated hypermetabolic activity, warranting biopsy.  3. Dominant central left thyroid nodule meets biopsy criteria.  4. Additional nodules do not meet biopsy criteria secondary to insufficient size/sonographic characteristics; recommendations for ultrasound surveillance provided above.  -10/21/2022:  MRI abdomen with and without contrast (comparison:  09/19/2022):  1. There are 2 small right hepatic lobe lesions suspicious for metastases.  One of these was hypermetabolic on recent PET-CT.  Right lobe lesion 9 mm, corresponding to hypermetabolic lesion on PET-CT.  More inferiorly in right hepatic lobe 6 mm, this was not evident on PET  2. Bilateral adrenal adenomas show no significant change going back to February 2022.  -11/04/2022: IR:  Liver lesion too small for biopsy  -11/04/2022:  Restaging bilateral diagnostic mammogram with contrast and limited ultrasound right breast (comparison:  06/17/2022):  Right breast mass 3.8 x 2.8 x 3.4 cm, previously 3.2 x 3.1 x 3.6 cm (06/17/2022) and 3 x 3.1 x 2.1 cm) 12/21/2021); right axillary lymph node 2.6 x 1 x 1.4 cm, previously 3.6 x 1.3 x 1.3 cm (06/17/2022) and 3.5 x 0.9 x 1.4 cm (12/21/2021) (right breast malignancy with nipple and skin involvement; compared to 06/17/2022, there has been interval development of at least 2 adjacent satellite lesions upper outer right breast and interval progression in the associated calcifications; total satellite lesions measure 3.6 x 5.0 x 5.3 cm, previously 3.8 x 3.5 x 3 cm on 06/17/2022 and 3.5 by 2.6 x 2.8 cm on 01/07/2022)        02/10/2022:  Presents for initial medical  oncology consultation, accompanied by her .     Interval History:  [No matching plan found]   OP BREAST PACLITAXEL Q2W   11/28/2022:  -neoadjuvant dose dense Taxol:  Cycle 3 (09/28/2022); cycle 4 (10/12/2022)  -10/17/2022:  Thyroid ultrasound:  1. Multinodular thyroid.  2. The dominant right upper thyroid pole nodule previously demonstrated hypermetabolic activity, warranting biopsy.  3. Dominant central left thyroid nodule meets biopsy criteria.  4. Additional nodules do not meet biopsy criteria secondary to insufficient size/sonographic characteristics; recommendations for ultrasound surveillance provided above.  -10/21/2022:  MRI abdomen with and without contrast (comparison:  09/19/2022):  1. There are 2 small right hepatic lobe lesions suspicious for metastases.  One of these was hypermetabolic on recent PET-CT.  Right lobe lesion 9 mm, corresponding to hypermetabolic lesion on PET-CT.  More inferiorly in right hepatic lobe 6 mm, this was not evident on PET  2. Bilateral adrenal adenomas show no significant change going back to February 2022.  -11/04/2022: IR:  Liver lesion too small for biopsy  -11/04/2022:  Restaging bilateral diagnostic mammogram with contrast and limited ultrasound right breast (comparison:  06/17/2022):  Right breast mass 3.8 x 2.8 x 3.4 cm, previously 3.2 x 3.1 x 3.6 cm (06/17/2022) and 3 x 3.1 x 2.1 cm) 12/21/2021); right axillary lymph node 2.6 x 1 x 1.4 cm, previously 3.6 x 1.3 x 1.3 cm (06/17/2022) and 3.5 x 0.9 x 1.4 cm (12/21/2021) (right breast malignancy with nipple and skin involvement; compared to 06/17/2022, there has been interval development of at least 2 adjacent satellite lesions upper outer right breast and interval progression in the associated calcifications; total satellite lesions measure 3.6 x 5.0 x 5.3 cm, previously 3.8 x 3.5 x 3 cm on 06/17/2022 and 3.5 by 2.6 x 2.8 cm on 01/07/2022)  Labs reviewed.  Presents for follow-up visit.  Overall, doing well.  Very  mild numbness and tingling in fingertips and toes; no functional impairment or distress.  Good appetite.  Good energy.  No breast pain.  Has not noticed any enlarging breast lump.  No unusual headaches, focal neurological symptoms, chest pain, cough, dyspnea, weakness, fatigue, anorexia, any new lumps or lymphadenopathy, etc..  ECOG 1.  No throat pain.  Says that she has appointment to see ENT doctors in respect of thyroid nodules, probably tomorrow.  No dysphagia or odynophagia.  No change in voice.      Medications:  Current Outpatient Medications on File Prior to Visit   Medication Sig Dispense Refill    dexAMETHasone (DECADRON) 4 MG Tab Take 5 tablets (20 mg total) by mouth As instructed (for chemotherapy). Take 5 tablets (20 mg total) po 12 hours before treatment and then repeat 6 hours before chemotherapy treatment 30 tablet 0    ondansetron (ZOFRAN) 8 MG tablet Take 1 tablet (8 mg total) by mouth every 8 (eight) hours as needed for Nausea. 30 tablet 2    ciprofloxacin HCl (CIPRO) 500 MG tablet SMARTSI Tablet(s) By Mouth Every 12 Hours       No current facility-administered medications on file prior to visit.       Review of Systems:   All systems reviewed and found to be negative except for the symptoms detailed above    Physical Examination:   VITAL SIGNS:   Vitals:    22 1259   BP: 112/71   Pulse: 74   Resp: 20   Temp: 97.9 °F (36.6 °C)     GENERAL:  In no apparent distress.    HEAD:  No signs of head trauma.  EYES:  Pupils are equal.  Extraocular motions intact.    EARS:  Hearing grossly intact.  MOUTH:  Oropharynx is normal.   NECK:  No adenopathy, no JVD.     CHEST:  Chest with clear breath sounds bilaterally.  No wheezes, rales, rhonchi.    CARDIAC:  Regular rate and rhythm.  S1 and S2, without murmurs, gallops, rubs.  VASCULAR:  No Edema.  Peripheral pulses normal and equal in all extremities.  ABDOMEN:  Soft, without detectable tenderness.  No sign of distention.  No   rebound or guarding,  and no masses palpated.   Bowel Sounds normal.  MUSCULOSKELETAL:  Good range of motion of all major joints. Extremities without clubbing, cyanosis or edema.    NEUROLOGIC EXAM:  Alert and oriented x 3.  No focal sensory or strength deficits.   Speech normal.  Follows commands.  PSYCHIATRIC:  Mood normal.    No results for input(s): CBC in the last 72 hours.   No results for input(s): CMP in the last 72 hours.     Assessment:  Problem List Items Addressed This Visit          Hematology    Chronic deep vein thrombosis (DVT) of distal vein of right lower extremity       Oncology    Breast cancer, right - Primary    Liver metastases       Endocrine    Adrenal nodule    Multinodular goiter    #IDC right breast, practically triple negative:  To summarize:  -IDC right breast, practically triple negative (ER 1%, low +; TN negative; HER2 negative)  -right breast mass noted 02/2020; she did not follow-up;   -biopsy 01/07/2022  -ER low + (1%).  TN negative (0%).  HER2 negative.  Ki-67 high proliferation (50%).  -3.1 cm mass on mammogram.  Grade 3. Right axillary lymph node biopsy +  -cT2 cN1 MX, grade 3, practically triple negative, AJCC anatomic stage IIB, clinical prognostic stage IIIB  -S/P neoadjuvant ddAC x4 (04/13/2022-06/02/2022), with some progression on restaging mammogram and ultrasound 06/17/2022  -S/P neoadjuvant dose dense Taxol x4 (08/31/2022-10/12/2022)  >> progression of right breast lesion but right axillary lymph node smaller on restaging bilateral diagnostic mammogram with contrast and limited ultrasound right breast 11/04/2022  -suspicion of small liver metastases on PET-CT 09/19/2022 and MRI abdomen 10/21/2022 (1.9 x 1.2 cm on PET-CT but 9 mm on MRI); per IR, too small for biopsy      # molecular markers:  -breast biopsy (01/07/2022):    PIK3CA mutation +; PD-L1 expression (TPS < 1%; CPS 5); BRCA1, BRCA2 negative;   HER2 negative; NTRK1-3 negative; microsatellite stable; TMB-low (TMB score 6.7 Mut/Mb);    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 Multinodular thyroid.  2. The dominant right upper thyroid pole nodule previously demonstrated hypermetabolic activity, warranting biopsy.  3. Dominant central left thyroid nodule meets biopsy criteria.  4. Additional nodules do not meet biopsy criteria secondary to insufficient size/sonographic characteristics; recommendations for ultrasound surveillance provided above.        #History of right lower extremity DVT 02/19/2020:   -02/19/2020: (Right leg swelling for 3 days) acute nonocclusive DVT in the right distal femoral vein; acute occlusive DVTs right popliteal and right peroneal veins   -Apparently, at the same time, was also noted to have a breast lump (right breast, 3:00, a small lump, nontender, slightly irregular on palpation, not hard or fluctuant, no erythema)   -Apparently, [facility] ambulatory clinics tried to contact her but without any response        Plan:  -cT2 cN1 MX, grade 3, practically triple negative, AJCC anatomic stage IIB, clinical prognostic stage IIIB  -S/P neoadjuvant ddAC x4 (04/13/2022-06/02/2022), with some progression on restaging mammogram and ultrasound 06/17/2022  -S/P neoadjuvant dose dense Taxol x4 (08/31/2022-10/12/2022)  >> progression of right breast lesion but right axillary lymph node smaller on restaging bilateral diagnostic mammogram with contrast and limited ultrasound right breast 11/04/2022  -suspicion of small liver metastases on PET-CT 09/19/2022 and MRI abdomen 10/21/2022 (1.9 x 1.2 cm on PET-CT but 9 mm on MRI); per IR, too small for biopsy  >>>  Suspicious liver lesion too small for biopsy  Will repeat contrast enhanced CT scans of C/A/P in 3 months (mid January) to rule out more florid  evolution of metastatic disease  Pending: Genetic testing (ordered 07/29/2022)  Has completed neoadjuvant chemotherapy  Progression of right breast lesion, but right axillary lymph node smaller on restaging mammogram and ultrasound  Refer to surgery for resection at this time  Since right axillary lymph node biopsy was +, therefore, will need adjuvant radiotherapy  In adjuvant setting, will offer her zoledronic acid x3-5 years (see below)  Will need adjuvant endocrine therapy appropriate for ER + postmenopausal patient  May need adjuvant olaparib (see below)  Will need 2 years of adjuvant Abemaciclib in combination with endocrine therapy (see below)    On PET-CT, right hemithyroid hypermetabolic low-density nodule, 1.6 x 1.4 cm, maximum SUV 4.6, every attenuation 39 HU  10/17/2022: Thyroid ultrasound:  Suspicious findings  >>>  Will refer to ENT ASAP for management    -Despite the fact that ER is low positive (1%), regardless, in due course,   will need adjuvant endocrine therapy  -Since ER positive (low positive), HER-2 negative, at least 1 axillary lymph node positive, grade 3 disease, and Ki-67 score >20%, therefore, in due course, will benefit from   2 years of adjuvant abemaciclib in combination with endocrine therapy   -Since this tumor may behave more like triple negative breast cancer, therefore, we have   ordered genetic testing and counseling      If no metastasis, then, after neoadjuvant chemotherapy, will need BCS or mastectomy and surgical axillary staging.     -since right axillary lymph node was biopsy +, therefore, whether she undergoes lumpectomy or mastectomy,  will need adjuvant radiotherapy     If no metastasis, then, adjuvant systemic therapy after preoperative systemic therapy:   1.  Consider adjuvant bisphosphonate therapy for risk reduction of distant metastasis for 3-5 years in postmenopausal patients with high risk node-negative or node positive tumors   >> Will offer her zoledronic acid 4  mg IV every 6 months x3-5 years in the adjuvant setting;   2.  Adjuvant endocrine therapy appropriate for ER positive postmenopausal patient;   3.  Adjuvant olaparib if germline BRCA1/2 mutation positive and residual disease after preoperative therapy and a clinical stage, pathologic stage, estrogen receptor status, and tumor grade (CPS+EG) = 3 or >3 (tumor is ER positive, HER-2 negative)   (If patient qualifies for olaparib, then, olaparib can be used concurrently with endocrine therapy but only after adjuvant radiotherapy)   4.  This patient has at least 1 axillary lymph node positive; has grade 3 disease; Ki-67 score is 50%; therefore,   2 years of adjuvant abemaciclib can be considered in combination with endocrine therapy     Normal BMD on baseline DEXA scan.    Follow-up visit with me in 1 month, after breast surgery.     Above discussed at length with the patient.  All questions answered.   Discussed labs and scans and gave her copies of relevant records.  She understands and agrees this plan.       Follow-up:  No follow-ups on file.

## 2022-11-28 ENCOUNTER — OFFICE VISIT (OUTPATIENT)
Dept: HEMATOLOGY/ONCOLOGY | Facility: CLINIC | Age: 67
End: 2022-11-28
Payer: MEDICAID

## 2022-11-28 VITALS
HEIGHT: 64 IN | WEIGHT: 137.81 LBS | BODY MASS INDEX: 23.53 KG/M2 | SYSTOLIC BLOOD PRESSURE: 112 MMHG | OXYGEN SATURATION: 99 % | HEART RATE: 74 BPM | RESPIRATION RATE: 20 BRPM | TEMPERATURE: 98 F | DIASTOLIC BLOOD PRESSURE: 71 MMHG

## 2022-11-28 DIAGNOSIS — E27.8 ADRENAL NODULE: ICD-10-CM

## 2022-11-28 DIAGNOSIS — I82.5Z1 CHRONIC DEEP VEIN THROMBOSIS (DVT) OF DISTAL VEIN OF RIGHT LOWER EXTREMITY: ICD-10-CM

## 2022-11-28 DIAGNOSIS — C78.7 LIVER METASTASES: ICD-10-CM

## 2022-11-28 DIAGNOSIS — C50.911 MALIGNANT NEOPLASM OF RIGHT FEMALE BREAST, UNSPECIFIED ESTROGEN RECEPTOR STATUS, UNSPECIFIED SITE OF BREAST: Primary | ICD-10-CM

## 2022-11-28 DIAGNOSIS — E04.2 MULTINODULAR GOITER: ICD-10-CM

## 2022-11-28 PROCEDURE — 1159F PR MEDICATION LIST DOCUMENTED IN MEDICAL RECORD: ICD-10-PCS | Mod: CPTII,,, | Performed by: INTERNAL MEDICINE

## 2022-11-28 PROCEDURE — 3288F PR FALLS RISK ASSESSMENT DOCUMENTED: ICD-10-PCS | Mod: CPTII,,, | Performed by: INTERNAL MEDICINE

## 2022-11-28 PROCEDURE — 3008F BODY MASS INDEX DOCD: CPT | Mod: CPTII,,, | Performed by: INTERNAL MEDICINE

## 2022-11-28 PROCEDURE — 99213 OFFICE O/P EST LOW 20 MIN: CPT | Mod: PBBFAC | Performed by: INTERNAL MEDICINE

## 2022-11-28 PROCEDURE — 3288F FALL RISK ASSESSMENT DOCD: CPT | Mod: CPTII,,, | Performed by: INTERNAL MEDICINE

## 2022-11-28 PROCEDURE — 99215 OFFICE O/P EST HI 40 MIN: CPT | Mod: S$PBB,,, | Performed by: INTERNAL MEDICINE

## 2022-11-28 PROCEDURE — 3078F DIAST BP <80 MM HG: CPT | Mod: CPTII,,, | Performed by: INTERNAL MEDICINE

## 2022-11-28 PROCEDURE — 1101F PT FALLS ASSESS-DOCD LE1/YR: CPT | Mod: CPTII,,, | Performed by: INTERNAL MEDICINE

## 2022-11-28 PROCEDURE — 3074F SYST BP LT 130 MM HG: CPT | Mod: CPTII,,, | Performed by: INTERNAL MEDICINE

## 2022-11-28 PROCEDURE — 1101F PR PT FALLS ASSESS DOC 0-1 FALLS W/OUT INJ PAST YR: ICD-10-PCS | Mod: CPTII,,, | Performed by: INTERNAL MEDICINE

## 2022-11-28 PROCEDURE — 3078F PR MOST RECENT DIASTOLIC BLOOD PRESSURE < 80 MM HG: ICD-10-PCS | Mod: CPTII,,, | Performed by: INTERNAL MEDICINE

## 2022-11-28 PROCEDURE — 3008F PR BODY MASS INDEX (BMI) DOCUMENTED: ICD-10-PCS | Mod: CPTII,,, | Performed by: INTERNAL MEDICINE

## 2022-11-28 PROCEDURE — 3074F PR MOST RECENT SYSTOLIC BLOOD PRESSURE < 130 MM HG: ICD-10-PCS | Mod: CPTII,,, | Performed by: INTERNAL MEDICINE

## 2022-11-28 PROCEDURE — 99215 PR OFFICE/OUTPT VISIT, EST, LEVL V, 40-54 MIN: ICD-10-PCS | Mod: S$PBB,,, | Performed by: INTERNAL MEDICINE

## 2022-11-28 PROCEDURE — 1159F MED LIST DOCD IN RCRD: CPT | Mod: CPTII,,, | Performed by: INTERNAL MEDICINE

## 2022-11-28 NOTE — Clinical Note
Orders for today:  Repeat contrast enhanced CT scans of C/A/P middle of January to follow-up on liver lesions  Pending:  Genetic testing (ordered 07/29/2022) At this time, refer to surgery for resection of breast cancer  Refer to ENT ASAP for management of thyroid nodules  Follow-up visit with me in 1 month, after surgery.

## 2022-11-29 ENCOUNTER — OFFICE VISIT (OUTPATIENT)
Dept: OTOLARYNGOLOGY | Facility: CLINIC | Age: 67
End: 2022-11-29
Payer: MEDICAID

## 2022-11-29 VITALS
HEIGHT: 63 IN | DIASTOLIC BLOOD PRESSURE: 76 MMHG | RESPIRATION RATE: 16 BRPM | WEIGHT: 137 LBS | BODY MASS INDEX: 24.27 KG/M2 | HEART RATE: 83 BPM | TEMPERATURE: 98 F | SYSTOLIC BLOOD PRESSURE: 124 MMHG

## 2022-11-29 DIAGNOSIS — E04.2 MULTINODULAR GOITER: ICD-10-CM

## 2022-11-29 DIAGNOSIS — E04.2 MULTIPLE THYROID NODULES: Primary | ICD-10-CM

## 2022-11-29 PROCEDURE — 99214 OFFICE O/P EST MOD 30 MIN: CPT | Mod: PBBFAC | Performed by: STUDENT IN AN ORGANIZED HEALTH CARE EDUCATION/TRAINING PROGRAM

## 2022-11-29 NOTE — PROGRESS NOTES
[facility]  Otolaryngology Clinic Note    Asia Gallagher  Encounter Date: 2022  YOB: 1955  Physician: Tino Gonzalez MD    Chief Complaint: thyroid nodules    HPI: Asia Gallagher is a 67 y.o. female PMH IDC breast cancer s/p  Chemo, right LE DVT, referred for multinodular goiter.  Had ultrasound obtained on 10/18/22 with several nodules with biopsy recommended. She denies any dysphgia, odynophagia, hoarseness, sore throat, or palpable lumps or bumps of the neck.  No history of radiation, family history of breast or thyroid cancers.  She is no longer on blood thinners for her DVT    ROS:   General: Negative except per HPI  Skin: Denies rash, ulcer, or lesion.  Eyes: Denies vision changes or diplopia.  Ears: Negative except per HPI  Nose: Negative except per HPI  Throat/mouth: Negative except per HPI  Cardiovascular: Negative except per HPI  Respiratory: Negative except per HPI  Neck: Negative except per HPI  Endocrine: Negative except per HPI  Neurologic: Negative except per HPI    Other 10-point review of systems negative except per HPI      Review of patient's allergies indicates:  No Known Allergies    Past Medical History:   Diagnosis Date    Breast cancer        Past Surgical History:   Procedure Laterality Date    BREAST BIOPSY      CHOLECYSTECTOMY         Social History     Socioeconomic History    Marital status:    Tobacco Use    Smoking status: Former     Types: Cigarettes     Quit date: 3/5/2022     Years since quittin.7    Smokeless tobacco: Never   Substance and Sexual Activity    Alcohol use: Not Currently    Drug use: Never    Sexual activity: Yes       History reviewed. No pertinent family history.    Outpatient Encounter Medications as of 2022   Medication Sig Dispense Refill    ciprofloxacin HCl (CIPRO) 500 MG tablet SMARTSI Tablet(s) By Mouth Every 12 Hours      dexAMETHasone (DECADRON) 4 MG Tab Take 5 tablets (20 mg total) by mouth As  "instructed (for chemotherapy). Take 5 tablets (20 mg total) po 12 hours before treatment and then repeat 6 hours before chemotherapy treatment (Patient not taking: Reported on 11/29/2022) 30 tablet 0    ondansetron (ZOFRAN) 8 MG tablet Take 1 tablet (8 mg total) by mouth every 8 (eight) hours as needed for Nausea. (Patient not taking: Reported on 11/29/2022) 30 tablet 2     No facility-administered encounter medications on file as of 11/29/2022.       Physical Exam:  Vitals:    11/29/22 0954   BP: 124/76   BP Location: Right arm   Patient Position: Sitting   BP Method: Medium (Automatic)   Pulse: 83   Resp: 16   Temp: 97.8 °F (36.6 °C)   TempSrc: Oral   Weight: 62.1 kg (137 lb)   Height: 5' 3" (1.6 m)       Constitutional  General Appearance: well nourished, well-developed, AAO x3, NAD  HEENT  Eyes: PEERLA, EOMI, normal conjunctivae  Ears: Hearing well at conversation level;  Nose: septum midline, no inferior turbinate hypertrophy, no polyps, moist mucosa without erythema or blue hue  OC/OP: dentition moderate, no oral lesions, tongue/FOM/BOT- soft, no leukoplakia/ulcerations/ tenderness  Nasopharynx, Hypopharynx, and Larynx:    Indirect: attempted, limited view due to patient intolerance  Neck: soft, non-tender, no palpable lymph nodes   Thyroid region- indiscrete nodularity of the left thyroid greater than right thyroid, no discrete nodule palpated  Neuro: CN II - XII intact bilaterally  Cardiovascular: peripheral pulses palpable  Respiratory: non-labored respirations  Psychiatric: oriented to time, place and person, no depression, anxiety or agitation    Pertinent Data:    Shimon Devi MD  [phone] 10/18/2022 STAT     Narrative & Impression  EXAMINATION  US THYROID     CLINICAL HISTORY  eval of thyroid nodule; Malignant neoplasm of nipple and areola, right female breast     TECHNIQUE  Multiplanar grayscale sonographic images of the thyroid were obtained.     COMPARISON  None available at the time of " initial interpretation.     THYROID FINDINGS  Overall thyroid appearance: Background echogenicity is overall homogeneous and unremarkable throughout the thyroid tissue. Doppler interrogation reveals grossly normal and symmetric blood flow within each hemithyroid.     *Right lobe: 4.6 cm x 1.4 cm x 1.5 cm  *Left lobe: 4.9 cm x 1.4 cm x 1.9 cm  *Isthmus: 0.4 cm thickness     NODULE #1  *Location: Posterior upper right thyroid.  Of note, this corresponds to the area of FDG-avid hypodense nodule identified on PET CT dated 19 September 2022  *Max dimension: 1.5 cm  *Composition: solid / almost completely solid (2 pts)  *Echogenicity: very hypoechoic (3 pts)  *Shape: wider than tall (0 pts)  *Margin: smooth (0 pts)  *Echogenic foci: none or large comet-tail artifacts (0 pts)  Total score: 5 point(s)     TI-RADS: TR-4 (moderately suspicious)     Recommendation(s): FNA     NODULE #2  *Location: Central right hemithyroid.  *Max dimension: 1.5 cm  *Composition: solid / almost completely solid (2 pts)  *Echogenicity: hyperechoic or isoechoic (1 pt)  *Shape: wider than tall (0 pts)  *Margin: smooth (0 pts)  *Echogenic foci: none or large comet-tail artifacts (0 pts)  Total score: 3 point(s)     TI-RADS: TR-3 (mildly suspicious)     Recommendation(s): ULTRASOUND SURVEILLANCE at 1, 3, and 5 years.     NODULE #3  *Location: Isthmus, right of midline.  *Max dimension: 1.2 cm  *Composition: solid / almost completely solid (2 pts)  *Echogenicity: hyperechoic or isoechoic (1 pt)  *Shape: wider than tall (0 pts)  *Margin: smooth (0 pts)  *Echogenic foci: macrocalcifications (1 pt);  peripheral (rim) calcifications (2 pts)  Total score: 6 point(s)     TI-RADS: TR-4 (moderately suspicious)     Recommendation(s): ULTRASOUND SURVEILLANCE at 1, 2, 3, and 5 years.     NODULE #4  *Location: Central left hemithyroid  *Max dimension: 2.7 cm  *Composition: mixed cystic and solid (1 pt)  *Echogenicity: hypoechoic (2 pts)  *Shape: wider than tall  (0 pts)  *Margin: ill-defined (0 pts)  *Echogenic foci: punctate echogenic foci (3 pts)  Total score: 6 point(s)     TI-RADS: TR-4 (moderately suspicious)     Recommendation(s): FNA        ==========     Lymph nodes: No abnormally enlarged or otherwise suspicious appearing nodes are appreciated through the scanned region.     Other findings: Limited views of the extrathyroidal soft tissues and vascular structures are grossly unremarkable.     IMPRESSION  1. Multinodular thyroid.  2. The dominant right upper thyroid pole nodule previously demonstrated hypermetabolic activity, warranting biopsy.  3. Dominant central left thyroid nodule meets biopsy criteria.  4. Additional nodules do not meet biopsy criteria secondary to insufficient size/sonographic characteristics; recommendations for ultrasound surveillance provided above.      Assessment/Plan:  Asia Gallagher is a 67 y.o. female with breast cancer currently undergoing chemotherapy, referred for multinodular goiter meeting FNA criteria    - IR biopsy of nodules  - RTC 6 weeks  - Patient to undergo mastectomy in 1 month    Tino Gonzalez MD  [facility] Department of Otolaryngology  [facility]

## 2022-12-01 ENCOUNTER — TELEPHONE (OUTPATIENT)
Dept: INTERVENTIONAL RADIOLOGY/VASCULAR | Facility: HOSPITAL | Age: 67
End: 2022-12-01
Payer: MEDICAID

## 2022-12-08 ENCOUNTER — HOSPITAL ENCOUNTER (OUTPATIENT)
Dept: INTERVENTIONAL RADIOLOGY/VASCULAR | Facility: HOSPITAL | Age: 67
Discharge: HOME | End: 2022-12-08
Attending: STUDENT IN AN ORGANIZED HEALTH CARE EDUCATION/TRAINING PROGRAM
Payer: MEDICAID

## 2022-12-08 DIAGNOSIS — E04.1 THYROID NODULE: ICD-10-CM

## 2022-12-08 PROCEDURE — 10005 FNA BX W/US GDN 1ST LES: CPT

## 2022-12-08 PROCEDURE — 88173 CYTOPATH EVAL FNA REPORT: CPT | Performed by: STUDENT IN AN ORGANIZED HEALTH CARE EDUCATION/TRAINING PROGRAM

## 2022-12-12 LAB
ESTROGEN SERPL-MCNC: NORMAL PG/ML
INSULIN SERPL-ACNC: NORMAL U[IU]/ML
LAB AP CLINICAL INFORMATION: NORMAL
LAB AP EBUS/EUS SPECIMEN: NORMAL
LAB AP GROSS DESCRIPTION: NORMAL

## 2023-01-03 ENCOUNTER — TELEPHONE (OUTPATIENT)
Dept: HEMATOLOGY/ONCOLOGY | Facility: CLINIC | Age: 68
End: 2023-01-03
Payer: MEDICAID

## 2023-01-04 ENCOUNTER — OFFICE VISIT (OUTPATIENT)
Dept: HEMATOLOGY/ONCOLOGY | Facility: CLINIC | Age: 68
End: 2023-01-04
Payer: MEDICAID

## 2023-01-04 ENCOUNTER — INFUSION (OUTPATIENT)
Dept: INFUSION THERAPY | Facility: HOSPITAL | Age: 68
End: 2023-01-04
Attending: INTERNAL MEDICINE
Payer: MEDICAID

## 2023-01-04 VITALS
OXYGEN SATURATION: 100 % | HEIGHT: 63 IN | RESPIRATION RATE: 20 BRPM | SYSTOLIC BLOOD PRESSURE: 125 MMHG | BODY MASS INDEX: 23.32 KG/M2 | TEMPERATURE: 97 F | HEART RATE: 84 BPM | WEIGHT: 131.63 LBS | DIASTOLIC BLOOD PRESSURE: 77 MMHG

## 2023-01-04 DIAGNOSIS — E04.1 RIGHT THYROID NODULE: ICD-10-CM

## 2023-01-04 DIAGNOSIS — C50.911 RECURRENT BREAST CANCER, RIGHT: ICD-10-CM

## 2023-01-04 DIAGNOSIS — C50.911 MALIGNANT NEOPLASM OF RIGHT FEMALE BREAST, UNSPECIFIED ESTROGEN RECEPTOR STATUS, UNSPECIFIED SITE OF BREAST: Primary | ICD-10-CM

## 2023-01-04 DIAGNOSIS — T45.1X5A CHEMOTHERAPY-INDUCED NEUROPATHY: ICD-10-CM

## 2023-01-04 DIAGNOSIS — I82.5Z1 CHRONIC DEEP VEIN THROMBOSIS (DVT) OF DISTAL VEIN OF RIGHT LOWER EXTREMITY: ICD-10-CM

## 2023-01-04 DIAGNOSIS — E27.8 ADRENAL NODULE: ICD-10-CM

## 2023-01-04 DIAGNOSIS — E04.2 MULTINODULAR GOITER: ICD-10-CM

## 2023-01-04 DIAGNOSIS — K76.9 LESION OF LIVER: ICD-10-CM

## 2023-01-04 DIAGNOSIS — C78.7 LIVER METASTASES: ICD-10-CM

## 2023-01-04 DIAGNOSIS — C50.911 RECURRENT BREAST CANCER, RIGHT: Primary | ICD-10-CM

## 2023-01-04 DIAGNOSIS — Z86.718 HISTORY OF DEEP VENOUS THROMBOSIS (DVT) OF DISTAL VEIN OF RIGHT LOWER EXTREMITY: ICD-10-CM

## 2023-01-04 DIAGNOSIS — G62.0 CHEMOTHERAPY-INDUCED NEUROPATHY: ICD-10-CM

## 2023-01-04 PROCEDURE — 3288F PR FALLS RISK ASSESSMENT DOCUMENTED: ICD-10-PCS | Mod: CPTII,,, | Performed by: INTERNAL MEDICINE

## 2023-01-04 PROCEDURE — 1160F RVW MEDS BY RX/DR IN RCRD: CPT | Mod: CPTII,,, | Performed by: INTERNAL MEDICINE

## 2023-01-04 PROCEDURE — 1160F PR REVIEW ALL MEDS BY PRESCRIBER/CLIN PHARMACIST DOCUMENTED: ICD-10-PCS | Mod: CPTII,,, | Performed by: INTERNAL MEDICINE

## 2023-01-04 PROCEDURE — 1159F PR MEDICATION LIST DOCUMENTED IN MEDICAL RECORD: ICD-10-PCS | Mod: CPTII,,, | Performed by: INTERNAL MEDICINE

## 2023-01-04 PROCEDURE — 63600175 PHARM REV CODE 636 W HCPCS: Performed by: INTERNAL MEDICINE

## 2023-01-04 PROCEDURE — 3078F PR MOST RECENT DIASTOLIC BLOOD PRESSURE < 80 MM HG: ICD-10-PCS | Mod: CPTII,,, | Performed by: INTERNAL MEDICINE

## 2023-01-04 PROCEDURE — 96523 IRRIG DRUG DELIVERY DEVICE: CPT

## 2023-01-04 PROCEDURE — 1125F AMNT PAIN NOTED PAIN PRSNT: CPT | Mod: CPTII,,, | Performed by: INTERNAL MEDICINE

## 2023-01-04 PROCEDURE — 1125F PR PAIN SEVERITY QUANTIFIED, PAIN PRESENT: ICD-10-PCS | Mod: CPTII,,, | Performed by: INTERNAL MEDICINE

## 2023-01-04 PROCEDURE — 25000003 PHARM REV CODE 250: Performed by: INTERNAL MEDICINE

## 2023-01-04 PROCEDURE — 3074F PR MOST RECENT SYSTOLIC BLOOD PRESSURE < 130 MM HG: ICD-10-PCS | Mod: CPTII,,, | Performed by: INTERNAL MEDICINE

## 2023-01-04 PROCEDURE — 1159F MED LIST DOCD IN RCRD: CPT | Mod: CPTII,,, | Performed by: INTERNAL MEDICINE

## 2023-01-04 PROCEDURE — 1101F PR PT FALLS ASSESS DOC 0-1 FALLS W/OUT INJ PAST YR: ICD-10-PCS | Mod: CPTII,,, | Performed by: INTERNAL MEDICINE

## 2023-01-04 PROCEDURE — 3078F DIAST BP <80 MM HG: CPT | Mod: CPTII,,, | Performed by: INTERNAL MEDICINE

## 2023-01-04 PROCEDURE — 1101F PT FALLS ASSESS-DOCD LE1/YR: CPT | Mod: CPTII,,, | Performed by: INTERNAL MEDICINE

## 2023-01-04 PROCEDURE — 3074F SYST BP LT 130 MM HG: CPT | Mod: CPTII,,, | Performed by: INTERNAL MEDICINE

## 2023-01-04 PROCEDURE — 3008F PR BODY MASS INDEX (BMI) DOCUMENTED: ICD-10-PCS | Mod: CPTII,,, | Performed by: INTERNAL MEDICINE

## 2023-01-04 PROCEDURE — 99215 PR OFFICE/OUTPT VISIT, EST, LEVL V, 40-54 MIN: ICD-10-PCS | Mod: S$PBB,,, | Performed by: INTERNAL MEDICINE

## 2023-01-04 PROCEDURE — 99215 OFFICE O/P EST HI 40 MIN: CPT | Mod: S$PBB,,, | Performed by: INTERNAL MEDICINE

## 2023-01-04 PROCEDURE — 3008F BODY MASS INDEX DOCD: CPT | Mod: CPTII,,, | Performed by: INTERNAL MEDICINE

## 2023-01-04 PROCEDURE — A4216 STERILE WATER/SALINE, 10 ML: HCPCS | Performed by: INTERNAL MEDICINE

## 2023-01-04 PROCEDURE — 99214 OFFICE O/P EST MOD 30 MIN: CPT | Mod: PBBFAC | Performed by: INTERNAL MEDICINE

## 2023-01-04 PROCEDURE — 3288F FALL RISK ASSESSMENT DOCD: CPT | Mod: CPTII,,, | Performed by: INTERNAL MEDICINE

## 2023-01-04 RX ORDER — SODIUM CHLORIDE 0.9 % (FLUSH) 0.9 %
10 SYRINGE (ML) INJECTION
Status: DISCONTINUED | OUTPATIENT
Start: 2023-01-04 | End: 2023-01-04 | Stop reason: HOSPADM

## 2023-01-04 RX ORDER — HEPARIN 100 UNIT/ML
500 SYRINGE INTRAVENOUS
Status: DISCONTINUED | OUTPATIENT
Start: 2023-01-04 | End: 2023-01-04 | Stop reason: HOSPADM

## 2023-01-04 RX ORDER — SODIUM CHLORIDE 0.9 % (FLUSH) 0.9 %
10 SYRINGE (ML) INJECTION
Status: CANCELLED | OUTPATIENT
Start: 2023-01-04

## 2023-01-04 RX ORDER — HEPARIN 100 UNIT/ML
500 SYRINGE INTRAVENOUS
Status: CANCELLED | OUTPATIENT
Start: 2023-01-04

## 2023-01-04 RX ADMIN — SODIUM CHLORIDE, PRESERVATIVE FREE 10 ML: 5 INJECTION INTRAVENOUS at 01:01

## 2023-01-04 RX ADMIN — HEPARIN 500 UNITS: 100 SYRINGE at 01:01

## 2023-01-04 NOTE — Clinical Note
Orders for today:   Pending:  Contrast enhanced CT scans of C/A/P (to be performed mid January)  Genetic testing Referral to surgery for resection of breast cancer  Follow-up with ENT for thyroid nodules  Follow-up in 1 month, after breast surgery

## 2023-01-04 NOTE — PROGRESS NOTES
History:  Past Medical History:   Diagnosis Date    Breast cancer    Past medical history: Obesity.  Tobacco abuse.   -2020: Acute nonocclusive DVT in the right distal femoral vein; acute occlusive DVTs right popliteal and right peroneal veins ((she never followed up on that)  Procedure/surgical history: Cholecystectomy ().  Bilateral tubal ligation (according to her).  Social history: .  Lives in [place], [place].  Has 2 children.  Children.  Does not work.  Smoked half a pack of cigarettes daily for 40 years; quit 2 weeks ago.  No alcohol or illicit drugs.  Family history: No family history of cancer.  Health maintenance: According to her, screening colonoscopy 5 years ago at [facility], probably revealing a benign polyp.  Menstrual and OB/GYN history: Menarche, age 13.  Menopause, age 59.  G2, P2.  No abortions or miscarriages.  Age at delivery of first child, 22.  Did not breast-feed her children.  Used birth control pills for 1 year, subsequently, bilateral tubal ligation.  No history of hormone replacement therapy.  Past Surgical History:   Procedure Laterality Date    BREAST BIOPSY      CHOLECYSTECTOMY        Social History     Socioeconomic History    Marital status:    Tobacco Use    Smoking status: Former     Types: Cigarettes     Quit date: 3/5/2022     Years since quittin.8    Smokeless tobacco: Never   Substance and Sexual Activity    Alcohol use: Not Currently    Drug use: Never    Sexual activity: Yes      History reviewed. No pertinent family history.     Reason for Follow-up:   -IDC right breast, axillary lymph node biopsy +, presumed triple negative, AJCC stage IIB, clinical prognostic stage IIIB  -liver lesions; adrenal lesions   -History of right lower extremity DVT    -right thyroid lesion       History of Present Illness:   Breast Cancer        Oncologic/Hematologic History:  Oncology History   Breast cancer, right   2022 Cancer Staged    Staging form: Breast, AJCC  8th Edition  - Clinical stage from 1/7/2022: Stage IIIA (cT2, cN1, cM0, G3, ER+, TN-, HER2-)       4/7/2022 Initial Diagnosis    Breast cancer     4/13/2022 - 6/3/2022 Chemotherapy    Treatment Summary   Plan Name: OP BREAST DOSE-DENSE AC - DOXORUBICIN CYCLOPHOSPHAMIDE Q2W  Treatment Goal: Control  Status: Inactive  Start Date: 4/13/2022  End Date: 6/3/2022  Provider: Gurmeet Hatfield MD  Chemotherapy: DOXOrubicin chemo injection 112 mg, 60 mg/m2 = 112 mg, Intravenous, Clinic/HOD 1 time, 4 of 4 cycles  Administration: 112 mg (5/5/2022), 112 mg (5/19/2022), 112 mg (6/2/2022)  cyclophosphamide 600 mg/m2 = 1,120 mg in sodium chloride 0.9% 250 mL chemo infusion, 600 mg/m2 = 1,120 mg, Intravenous, Clinic/HOD 1 time, 4 of 4 cycles  Administration: 1,100 mg (5/5/2022), 1,120 mg (5/19/2022), 1,100 mg (6/2/2022)       8/31/2022 -  Chemotherapy    Treatment Summary   Plan Name: OP BREAST PACLITAXEL Q2W  Treatment Goal: Control  Status: Active  Start Date: 8/31/2022  End Date: 10/13/2022  Provider: Gurmeet Hatfield MD  Chemotherapy: PACLitaxeL (TAXOL) 175 mg/m2 = 312 mg in sodium chloride 0.9% 500 mL chemo infusion, 175 mg/m2 = 312 mg, Intravenous, Clinic/HOD 1 time, 4 of 4 cycles  Administration: 312 mg (8/31/2022), 312 mg (9/14/2022), 312 mg (9/28/2022), 312 mg (10/12/2022)        66-year-old lady referred from surgery, with breast cancer.     She initially presented to emergency department 11/15/2021 for evaluation of breast pain and a palpable lump in the right breast.  Subsequent imaging studies and biopsy established diagnosis of invasive ductal carcinoma of right breast, axillary lymph node positive, ER low positive, TN negative, and HER-2 negative.    Oncologic history:   #IDC right breast, presumed triple negative:   -History of right lower extremity DVT and right breast mass 02/2020; she did not follow-up   -Presentation: 11/2021: Palpable mass   -01/07/2022: Bilateral diagnostic mammogram with contrast and  limited ultrasound right breast   -01/07/2022: Biopsy   -3.1 cm mass on mammogram, overall grade 3, right axillary lymph node biopsy positive   -ER low positive (1%).  IA negative (0%).  HER-2 negative by FISH.  Ki-67 high proliferation (50%)   >>>   For the purpose of neoadjuvant chemotherapy, since ER is low positive, we will treat the patient as if triple negative   >>>  -cT2 cN1 MX, grade 3, presumed triple negative, AJCC anatomic stage IIB, clinical prognostic stage IIIB   -02/22/2022: DEXA scan: Normal BMD   -02/22/2022: CT C/A/P with contrast for staging: Right breast mass with right axillary lymphadenopathy; bilateral adrenal nodules, statistically representing adenomas   -02/22/2022: Whole-body nuclear medicine bone scan: No bone metastasis   -03/02/2022: Mediport placed   -03/11/2022: TTE: LVEF 55-60%  -neoadjuvant DD AC started 04/13/2022; severe neutropenia, ANC 0.03, on 04/26/2022; therefore, cycle 2 held; treated with growth factor and prophylactic ciprofloxacin  -cycle 2 on 05/05/2022; cycle 3 on 05/19/2022; cycle 4 on 06/02/2022   -some progression on restaging mammogram and ultrasound 06/17/2022  -06/21/2022:  She was supposed to start neoadjuvant dose dense Taxol but was sent to emergency department for evaluation because she was feeling extremely weak, sick, unable to walk for 2-3 weeks  -06/21/2022:  WBC, differential unremarkable.  Hemoglobin 9.2, stable.  Platelets 210 K. ANC 5.6.  CMP stable and within acceptable limits.  Magnesium normal.  BNP normal.  TSH 2.2180, normal.    -06/21/2022:  V/Q scan:  Normal/very low probability of pulmonary embolism  -07/15/2022:  CT C/A/P with contrast (comparison:  02/22/2022):   Mass in the right breast with associated skin thickening in the right breast overall slightly less prominent than the prior examination.  The right axillary lymphadenopathy is also less prominent than the prior examination   Interval development of multiple punctate hypoattenuating  areas within the liver concerning for possible developing metastatic foci.  Short-term follow-up is recommended.  These nodules are too small to characterize and are all subcentimeter in size.   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  CA 27.29 level normal.  CA 15-3 level normal.  -neoadjuvant dose dense Taxol started 08/31/2022  -neoadjuvant dose dense Taxol: Cycle 1 (08/31/2022); cycle 2 (09/14/2022)  -09/09/2022: Whole-body nuclear medicine bone scan (comparison:  Bone scan 02/22/2022; CT C/A/P 0 07/15/2022): No bone metastasis  -09/19/2022: PET-CT to rule out metastatic disease (comparison:  09/09/2022 bone scan; 07/15/2022 CT C/A/P):  1. Slight interval enlargement of hypermetabolic right breast mass and associated right axillary node.  2. Metastatic right hepatic lesion (central aspect of right liver, 1.9 x 1.2 cm), with no other definite FDG-avid or aggressive appearing process through the neck, chest, abdomen, or pelvis.  3. Subcentimeter [place] foci are less conspicuous and again of limited characterization due to size and non-contrast protocol, but statistically favored to represent benign cysts.  Close attention on surveillance imaging is needed in order to ensure ongoing stability.  4. Incidental right adrenal adenoma (1.7 x 4.3 cm)  5. Diffuse osseous uptake is  favored to reflect sequela of systemic therapeutic agent.  6. Hypermetabolic low-density nodule posterior right hemithyroid, 1.6 x 1.4 cm, maximum SUV 4.6, every -neoadjuvant dose dense Taxol:  Cycle 3 (09/28/2022); cycle 4 (10/12/2022)  -10/17/2022:  Thyroid ultrasound:  1. Multinodular thyroid.  2. The dominant right upper thyroid pole nodule previously demonstrated hypermetabolic activity, warranting biopsy.  3. Dominant central left thyroid nodule meets biopsy criteria.  4. Additional nodules do not meet biopsy criteria secondary to insufficient size/sonographic characteristics; recommendations for ultrasound surveillance provided above.  -10/21/2022:  MRI abdomen with and without contrast (comparison:  09/19/2022):  1. There are 2 small right hepatic lobe lesions suspicious for metastases.  One of these was hypermetabolic on recent PET-CT.  Right lobe lesion 9 mm, corresponding to hypermetabolic lesion on PET-CT.  More inferiorly in right hepatic lobe 6 mm, this was not evident on PET  2. Bilateral adrenal adenomas show no significant change going back to February 2022.  -11/04/2022: IR:  Liver lesion too small for biopsy  -11/04/2022:  Restaging bilateral diagnostic mammogram with contrast and limited ultrasound right breast (comparison:  06/17/2022):  Right breast mass 3.8 x 2.8 x 3.4 cm, previously 3.2 x 3.1 x 3.6 cm (06/17/2022) and 3 x 3.1 x 2.1 cm) 12/21/2021); right axillary lymph node 2.6 x 1 x 1.4 cm, previously 3.6 x 1.3 x 1.3 cm (06/17/2022) and 3.5 x 0.9 x 1.4 cm (12/21/2021) (right breast malignancy with nipple and skin involvement; compared to 06/17/2022, there has been interval development of at least 2 adjacent satellite lesions upper outer right breast and interval progression in the associated calcifications; total satellite lesions measure 3.6 x 5.0 x 5.3 cm, previously 3.8 x 3.5 x 3 cm on 06/17/2022 and 3.5 by 2.6 x 2.8 cm on 01/07/2022)  -12/08/2022:  Ultrasound-guided biopsy, bilateral  (FNA): Pathology:  1. Fine needle aspiration of left thyroid nodule: Unsatisfactory. Insufficient cells for diagnostic evaluation.   2. Fine needle aspiration of right thyroid nodule: Cytomorphology  of a benign thyroid nodule.        02/10/2022:  Presents for initial medical oncology consultation, accompanied by her .     Interval History:  [No matching plan found]   OP BREAST PACLITAXEL Q2W   2023:  -2022:  Ultrasound-guided biopsy, bilateral (FNA): Pathology:  1. Fine needle aspiration of left thyroid nodule: Unsatisfactory. Insufficient cells for diagnostic evaluation.   2. Fine needle aspiration of right thyroid nodule: Cytomorphology  of a benign thyroid nodule.  Presents for a follow-up visit.  Says that the lump in right breast his back.  Lumpy somewhat painful.  No bleeding or nipple discharge.  Fair appetite.  Some shortness of breath but not severe.  Reports some numbness in the right upper abdomen/right lower anterior chest.  Toes and fingers are numb (neuropathic side effect of Taxol); no functional impairment.  No unusual headaches or focal neurological symptoms.  ECOG 1.  No bone pains.        Medications:  Current Outpatient Medications on File Prior to Visit   Medication Sig Dispense Refill    ciprofloxacin HCl (CIPRO) 500 MG tablet SMARTSI Tablet(s) By Mouth Every 12 Hours      dexAMETHasone (DECADRON) 4 MG Tab Take 5 tablets (20 mg total) by mouth As instructed (for chemotherapy). Take 5 tablets (20 mg total) po 12 hours before treatment and then repeat 6 hours before chemotherapy treatment (Patient not taking: Reported on 2022) 30 tablet 0    ondansetron (ZOFRAN) 8 MG tablet Take 1 tablet (8 mg total) by mouth every 8 (eight) hours as needed for Nausea. (Patient not taking: Reported on 2022) 30 tablet 2     No current facility-administered medications on file prior to visit.       Review of Systems:   All systems reviewed and found to be negative except for the  symptoms detailed above    Physical Examination:   VITAL SIGNS:   Vitals:    01/04/23 1134   BP: 125/77   Pulse: 84   Resp: 20   Temp: 97.3 °F (36.3 °C)       GENERAL:  In no apparent distress.    HEAD:  No signs of head trauma.  EYES:  Pupils are equal.  Extraocular motions intact.    EARS:  Hearing grossly intact.  MOUTH:  Oropharynx is normal.   NECK:  No adenopathy, no JVD.     CHEST:  Chest with clear breath sounds bilaterally.  No wheezes, rales, rhonchi.    CARDIAC:  Regular rate and rhythm.  S1 and S2, without murmurs, gallops, rubs.  VASCULAR:  No Edema.  Peripheral pulses normal and equal in all extremities.  ABDOMEN:  Soft, without detectable tenderness.  No sign of distention.  No   rebound or guarding, and no masses palpated.   Bowel Sounds normal.  MUSCULOSKELETAL:  Good range of motion of all major joints. Extremities without clubbing, cyanosis or edema.    NEUROLOGIC EXAM:  Alert and oriented x 3.  No focal sensory or strength deficits.   Speech normal.  Follows commands.  PSYCHIATRIC:  Mood normal.    No results for input(s): CBC in the last 72 hours.   No results for input(s): CMP in the last 72 hours.     Assessment:  Problem List Items Addressed This Visit          Hematology    History of deep venous thrombosis (DVT) of distal vein of right lower extremity    Chronic deep vein thrombosis (DVT) of distal vein of right lower extremity       Oncology    Breast cancer, right - Primary    Liver metastases       Endocrine    Adrenal nodule    Right thyroid nodule    Multinodular goiter       GI    Lesion of liver      #IDC right breast, practically triple negative:  To summarize:  -IDC right breast, practically triple negative (ER 1%, low +; HI negative; HER2 negative)  -right breast mass initially noted 02/2020; she did not follow-up;   -biopsy 01/07/2022  -ER low + (1%).  HI negative (0%).  HER2 negative.  Ki-67 high proliferation (50%).  -3.1 cm mass on mammogram.  Grade 3. Right axillary lymph  node biopsy +  -cT2 cN1 MX, grade 3, practically triple negative, AJCC anatomic stage IIB, clinical prognostic stage IIIB  -S/P neoadjuvant ddAC x4 (04/13/2022-06/02/2022), with some progression on restaging mammogram and ultrasound 06/17/2022  -S/P neoadjuvant dose dense Taxol x4 (08/31/2022-10/12/2022)  >> progression of right breast lesion but right axillary lymph node smaller on restaging bilateral diagnostic mammogram with contrast and limited ultrasound right breast 11/04/2022  -suspicion of small liver metastases on PET-CT 09/19/2022 and MRI abdomen 10/21/2022 (1.9 x 1.2 cm on PET-CT but 9 mm on MRI); per IR, too small for biopsy  -01/04/2023: Says that the lump in right breast is back      # molecular markers:  -breast biopsy (01/07/2022):    ER low + (1%).  LA negative (0%).  HER2 negative.  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 Multinodular thyroid.  2. The dominant right upper thyroid pole nodule previously demonstrated hypermetabolic activity, warranting biopsy.  3. Dominant central left thyroid nodule meets biopsy criteria.  4. Additional nodules do not meet biopsy criteria secondary to insufficient size/sonographic characteristics; recommendations for ultrasound surveillance provided above.  -12/08/2022:  Ultrasound-guided biopsy, bilateral (FNA): Pathology:  1. Fine needle aspiration of left thyroid nodule: Unsatisfactory. Insufficient cells for diagnostic evaluation.   2. Fine needle aspiration of right thyroid nodule: Cytomorphology  of a benign thyroid nodule.        #History of right  lower extremity DVT 02/19/2020:   -02/19/2020: (Right leg swelling for 3 days) acute nonocclusive DVT in the right distal femoral vein; acute occlusive DVTs right popliteal and right peroneal veins   -Apparently, at the same time, was also noted to have a breast lump (right breast, 3:00, a small lump, nontender, slightly irregular on palpation, not hard or fluctuant, no erythema)   -Apparently, [facility] ambulatory clinics tried to contact her but without any response        Plan:  Pending:  Contrast-enhanced CT scans of C/A/P (mid January)   Genetic testing  Refer to surgery for resection      -cT2 cN1 MX, grade 3, practically triple negative, AJCC anatomic stage IIB, clinical prognostic stage IIIB  -S/P neoadjuvant ddAC x4 (04/13/2022-06/02/2022), with some progression on restaging mammogram and ultrasound 06/17/2022  -S/P neoadjuvant dose dense Taxol x4 (08/31/2022-10/12/2022)  >> progression of right breast lesion but right axillary lymph node smaller on restaging bilateral diagnostic mammogram with contrast and limited ultrasound right breast 11/04/2022  -suspicion of small liver metastases on PET-CT 09/19/2022 and MRI abdomen 10/21/2022 (1.9 x 1.2 cm on PET-CT but 9 mm on MRI); per IR, too small for biopsy  >>>  Suspicious liver lesion too small for biopsy  Will repeat contrast enhanced CT scans of C/A/P in 3 months (mid January) to rule out more florid evolution of metastatic disease  Pending: Genetic testing (ordered 07/29/2022)  Has completed neoadjuvant chemotherapy  Progression of right breast lesion, but right axillary lymph node smaller on restaging mammogram and ultrasound  Refer to surgery for resection at this time  Since right axillary lymph node biopsy was +, therefore, will need adjuvant radiotherapy  In adjuvant setting, will offer her zoledronic acid x3-5 years (see below)  Will need adjuvant endocrine therapy appropriate for ER + postmenopausal patient  May need adjuvant olaparib (see below)  Will  need 2 years of adjuvant Abemaciclib in combination with endocrine therapy (see below)    01/04/2023: Says that lump in right breast his back; has not been contacted by surgery as yet; we will send another urgent referral to surgery for evaluation    In view of recurrence of right breast lump, will stage with contrast enhanced CT scans of C/A/P ASAP, then follow-up in 2 weeks, with scans, CBC, CMP.    On PET-CT, right hemithyroid hypermetabolic low-density nodule, 1.6 x 1.4 cm, maximum SUV 4.6, every attenuation 39 HU  10/17/2022: Thyroid ultrasound:  Suspicious findings  -12/08/2022:  Ultrasound-guided biopsy, bilateral (FNA): Pathology:  1. Fine needle aspiration of left thyroid nodule: Unsatisfactory. Insufficient cells for diagnostic evaluation.   2. Fine needle aspiration of right thyroid nodule: Cytomorphology  of a benign thyroid nodule.  >>>  Follow-up with ENT    -Despite the fact that ER is low positive (1%), regardless, in due course,   will need adjuvant endocrine therapy  -Since ER positive (low positive), HER-2 negative, at least 1 axillary lymph node positive, grade 3 disease, and Ki-67 score >20%, therefore, in due course, will benefit from   2 years of adjuvant abemaciclib in combination with endocrine therapy   -Since this tumor may behave more like triple negative breast cancer, therefore, we have   ordered genetic testing and counseling      If no metastasis, then, after neoadjuvant chemotherapy, will need BCS or mastectomy and surgical axillary staging.     -since right axillary lymph node was biopsy +, therefore, whether she undergoes lumpectomy or mastectomy,  will need adjuvant radiotherapy     If no metastasis, then, adjuvant systemic therapy after preoperative systemic therapy:   1.  Consider adjuvant bisphosphonate therapy for risk reduction of distant metastasis for 3-5 years in postmenopausal patients with high risk node-negative or node positive tumors   >> Will offer her zoledronic  acid 4 mg IV every 6 months x3-5 years in the adjuvant setting;   2.  Adjuvant endocrine therapy appropriate for ER positive postmenopausal patient;   3.  Adjuvant olaparib if germline BRCA1/2 mutation positive and residual disease after preoperative therapy and a clinical stage, pathologic stage, estrogen receptor status, and tumor grade (CPS+EG) = 3 or >3 (tumor is ER positive, HER-2 negative)   (If patient qualifies for olaparib, then, olaparib can be used concurrently with endocrine therapy but only after adjuvant radiotherapy)   4.  This patient has at least 1 axillary lymph node positive; has grade 3 disease; Ki-67 score is 50%; therefore,   2 years of adjuvant abemaciclib can be considered in combination with endocrine therapy     Normal BMD on baseline DEXA scan.    Follow-up within 2 weeks with contrast enhanced CT scans of C/A/P for staging, CBC, and CMP.    01/04/2023:  Today, we are going to send another urgent referral to surgery for evaluation; she reports recurrent tumor in right breast.     Above discussed at length with the patient.  All questions answered.   Discussed labs and scans and gave her copies of relevant records.  She understands and agrees this plan.       Follow-up:  No follow-ups on file.

## 2023-01-04 NOTE — Clinical Note
Addendum: Please get contrast-enhanced CT scans of C/A/P ASAP, then follow-up with me in 2 weeks, with CBC, CMP, and scans.

## 2023-01-09 ENCOUNTER — HOSPITAL ENCOUNTER (OUTPATIENT)
Dept: RADIOLOGY | Facility: HOSPITAL | Age: 68
Discharge: HOME | End: 2023-01-09
Attending: INTERNAL MEDICINE
Payer: MEDICAID

## 2023-01-09 DIAGNOSIS — C78.7 LIVER METASTASES: ICD-10-CM

## 2023-01-09 DIAGNOSIS — C50.911 MALIGNANT NEOPLASM OF RIGHT FEMALE BREAST, UNSPECIFIED ESTROGEN RECEPTOR STATUS, UNSPECIFIED SITE OF BREAST: ICD-10-CM

## 2023-01-09 DIAGNOSIS — C50.911 BREAST CANCER, RIGHT: Primary | ICD-10-CM

## 2023-01-09 LAB
CREAT SERPL-MCNC: 1.32 MG/DL (ref 0.55–1.02)
GFR SERPLBLD CREATININE-BSD FMLA CKD-EPI: 45 MLS/MIN/1.73/M2

## 2023-01-09 PROCEDURE — 82565 ASSAY OF CREATININE: CPT | Performed by: INTERNAL MEDICINE

## 2023-01-09 PROCEDURE — A9698 NON-RAD CONTRAST MATERIALNOC: HCPCS

## 2023-01-09 PROCEDURE — 25500020 PHARM REV CODE 255

## 2023-01-09 PROCEDURE — 71270 CT THORAX DX C-/C+: CPT | Mod: TC

## 2023-01-09 RX ADMIN — BARIUM SULFATE 450 ML: 20 SUSPENSION ORAL at 10:01

## 2023-01-09 RX ADMIN — IOHEXOL 100 ML: 350 INJECTION, SOLUTION INTRAVENOUS at 10:01

## 2023-01-12 ENCOUNTER — OFFICE VISIT (OUTPATIENT)
Dept: OTOLARYNGOLOGY | Facility: CLINIC | Age: 68
End: 2023-01-12
Payer: MEDICAID

## 2023-01-12 VITALS
HEART RATE: 103 BPM | SYSTOLIC BLOOD PRESSURE: 122 MMHG | DIASTOLIC BLOOD PRESSURE: 79 MMHG | WEIGHT: 131 LBS | BODY MASS INDEX: 23.21 KG/M2

## 2023-01-12 DIAGNOSIS — C50.911 RECURRENT BREAST CANCER, RIGHT: ICD-10-CM

## 2023-01-12 DIAGNOSIS — E04.1 RIGHT THYROID NODULE: ICD-10-CM

## 2023-01-12 DIAGNOSIS — E04.2 MULTINODULAR GOITER: Primary | ICD-10-CM

## 2023-01-12 DIAGNOSIS — C78.7 LIVER METASTASES: ICD-10-CM

## 2023-01-12 PROCEDURE — 99213 OFFICE O/P EST LOW 20 MIN: CPT | Mod: PBBFAC | Performed by: STUDENT IN AN ORGANIZED HEALTH CARE EDUCATION/TRAINING PROGRAM

## 2023-01-12 NOTE — PROGRESS NOTES
[facility]  Otolaryngology Clinic Note    Asia Gallagher  Encounter Date: 2023  YOB: 1955  Physician: iTno Gonzalez MD    Chief Complaint: thyroid nodules    HPI: Asia Gallagher is a 67 y.o. female PMH IDC breast cancer s/p  Chemo, right LE DVT, referred for multinodular goiter.  Had ultrasound obtained on 10/18/22 with several nodules with biopsy recommended. She denies any dysphgia, odynophagia, hoarseness, sore throat, or palpable lumps or bumps of the neck.  No history of radiation, family history of breast or thyroid cancers.  She is no longer on blood thinners for her DVT    29: Here today for follow up after IR bx of nodules. Patient states she has a new lump in her breast, meeting with Dr Hatfield next week, also supposed to have breast resection. Patient denies any issues with thyroid, swallowing well, breathing well. No changes to hearing, vision, nasal obstruction, mouth.     ROS:   General: Negative except per HPI  Skin: Denies rash, ulcer, or lesion.  Eyes: Denies vision changes or diplopia.  Ears: Negative except per HPI  Nose: Negative except per HPI  Throat/mouth: Negative except per HPI  Cardiovascular: Negative except per HPI  Respiratory: Negative except per HPI  Neck: Negative except per HPI  Endocrine: Negative except per HPI  Neurologic: Negative except per HPI    Other 10-point review of systems negative except per HPI      Review of patient's allergies indicates:  No Known Allergies    Past Medical History:   Diagnosis Date    Breast cancer        Past Surgical History:   Procedure Laterality Date    BREAST BIOPSY      CHOLECYSTECTOMY         Social History     Socioeconomic History    Marital status:    Tobacco Use    Smoking status: Former     Types: Cigarettes     Quit date: 3/5/2022     Years since quittin.8    Smokeless tobacco: Never   Substance and Sexual Activity    Alcohol use: Not Currently    Drug use: Never    Sexual  activity: Yes       History reviewed. No pertinent family history.    Outpatient Encounter Medications as of 2023   Medication Sig Dispense Refill    ciprofloxacin HCl (CIPRO) 500 MG tablet SMARTSI Tablet(s) By Mouth Every 12 Hours      dexAMETHasone (DECADRON) 4 MG Tab Take 5 tablets (20 mg total) by mouth As instructed (for chemotherapy). Take 5 tablets (20 mg total) po 12 hours before treatment and then repeat 6 hours before chemotherapy treatment (Patient not taking: Reported on 2022) 30 tablet 0    ondansetron (ZOFRAN) 8 MG tablet Take 1 tablet (8 mg total) by mouth every 8 (eight) hours as needed for Nausea. (Patient not taking: Reported on 2022) 30 tablet 2     No facility-administered encounter medications on file as of 2023.       Physical Exam:  Vitals:    23 1038   BP: 122/79   Pulse: 103   Weight: 59.4 kg (131 lb)       Constitutional  General Appearance: well nourished, well-developed, AAO x3, NAD  HEENT  Eyes: PEERLA, EOMI, normal conjunctivae  Ears: Hearing well at conversation level;  Nose: septum midline, no inferior turbinate hypertrophy, no polyps, moist mucosa without erythema or blue hue  OC/OP: dentition moderate, no oral lesions, tongue/FOM/BOT- soft, no leukoplakia/ulcerations/ tenderness  Nasopharynx, Hypopharynx, and Larynx:    Indirect: attempted, limited view due to patient intolerance  Neck: soft, non-tender, no palpable lymph nodes   Thyroid region- indiscrete nodularity of the left thyroid greater than right thyroid, no discrete nodule palpated  Neuro: CN II - XII intact bilaterally  Cardiovascular: peripheral pulses palpable  Respiratory: non-labored respirations  Psychiatric: oriented to time, place and person, no depression, anxiety or agitation    Pertinent Data:    Shimon Devi MD  [phone] 10/18/2022 STAT     Narrative & Impression  EXAMINATION  US THYROID     CLINICAL HISTORY  eval of thyroid nodule; Malignant neoplasm of nipple and areola,  right female breast     TECHNIQUE  Multiplanar grayscale sonographic images of the thyroid were obtained.     COMPARISON  None available at the time of initial interpretation.     THYROID FINDINGS  Overall thyroid appearance: Background echogenicity is overall homogeneous and unremarkable throughout the thyroid tissue. Doppler interrogation reveals grossly normal and symmetric blood flow within each hemithyroid.     *Right lobe: 4.6 cm x 1.4 cm x 1.5 cm  *Left lobe: 4.9 cm x 1.4 cm x 1.9 cm  *Isthmus: 0.4 cm thickness     NODULE #1  *Location: Posterior upper right thyroid.  Of note, this corresponds to the area of FDG-avid hypodense nodule identified on PET CT dated 19 September 2022  *Max dimension: 1.5 cm  *Composition: solid / almost completely solid (2 pts)  *Echogenicity: very hypoechoic (3 pts)  *Shape: wider than tall (0 pts)  *Margin: smooth (0 pts)  *Echogenic foci: none or large comet-tail artifacts (0 pts)  Total score: 5 point(s)     TI-RADS: TR-4 (moderately suspicious)     Recommendation(s): FNA     NODULE #2  *Location: Central right hemithyroid.  *Max dimension: 1.5 cm  *Composition: solid / almost completely solid (2 pts)  *Echogenicity: hyperechoic or isoechoic (1 pt)  *Shape: wider than tall (0 pts)  *Margin: smooth (0 pts)  *Echogenic foci: none or large comet-tail artifacts (0 pts)  Total score: 3 point(s)     TI-RADS: TR-3 (mildly suspicious)     Recommendation(s): ULTRASOUND SURVEILLANCE at 1, 3, and 5 years.     NODULE #3  *Location: Isthmus, right of midline.  *Max dimension: 1.2 cm  *Composition: solid / almost completely solid (2 pts)  *Echogenicity: hyperechoic or isoechoic (1 pt)  *Shape: wider than tall (0 pts)  *Margin: smooth (0 pts)  *Echogenic foci: macrocalcifications (1 pt);  peripheral (rim) calcifications (2 pts)  Total score: 6 point(s)     TI-RADS: TR-4 (moderately suspicious)     Recommendation(s): ULTRASOUND SURVEILLANCE at 1, 2, 3, and 5 years.     NODULE #4  *Location:  Central left hemithyroid  *Max dimension: 2.7 cm  *Composition: mixed cystic and solid (1 pt)  *Echogenicity: hypoechoic (2 pts)  *Shape: wider than tall (0 pts)  *Margin: ill-defined (0 pts)  *Echogenic foci: punctate echogenic foci (3 pts)  Total score: 6 point(s)     TI-RADS: TR-4 (moderately suspicious)     Recommendation(s): FNA        ==========     Lymph nodes: No abnormally enlarged or otherwise suspicious appearing nodes are appreciated through the scanned region.     Other findings: Limited views of the extrathyroidal soft tissues and vascular structures are grossly unremarkable.     IMPRESSION  1. Multinodular thyroid.  2. The dominant right upper thyroid pole nodule previously demonstrated hypermetabolic activity, warranting biopsy.  3. Dominant central left thyroid nodule meets biopsy criteria.  4. Additional nodules do not meet biopsy criteria secondary to insufficient size/sonographic characteristics; recommendations for ultrasound surveillance provided above.    FNA:  Final Diagnosis   1. Fine needle aspiration of left thyroid nodule:   -  Unsatisfactory. Insufficient cells for diagnostic evaluation.        2. Fine needle aspiration of right thyroid nodule:   - Cytomorphology  of a benign thyroid nodule.         Assessment/Plan:  Asia Gallagher is a 67 y.o. female with triple negative breast cancer s/p chemotherapy. With new breast lump, plans to see Dr Hatfield and Breast Surgeon in the next week. Discussed with patient nondiagnostic finding of L thyroid nodule and benign of R nodule. Hard to ascertain if nodule bx was the posterior nodule that was seen in PET. Discussed option of observation vs repeat US vs repeat bx vs thyroidectomy. Given patient current state with metastatic breast cancer and new lump will defer to repeat US in 3 months given she has more time sensitive treatments she needs including mastectomy.    - Thyroid/neck US in 3 months   - If any changes of nodules at that time, repeat  FNA   - RTC 3 months     Tino Gonzalez MD  [employer] Department of Otolaryngology  [facility] [place]

## 2023-01-17 NOTE — PROGRESS NOTES
I reviewed the history and physical exam with the resident.   I agree with findings and plan.    Cristopher Scherer M.D.

## 2023-01-20 ENCOUNTER — TELEPHONE (OUTPATIENT)
Dept: HEMATOLOGY/ONCOLOGY | Facility: CLINIC | Age: 68
End: 2023-01-20
Payer: MEDICAID

## 2023-01-22 NOTE — PROGRESS NOTES
History:  Past Medical History:   Diagnosis Date    Breast cancer    Past medical history: Obesity.  Tobacco abuse.   -2020: Acute nonocclusive DVT in the right distal femoral vein; acute occlusive DVTs right popliteal and right peroneal veins ((she never followed up on that)  Procedure/surgical history: Cholecystectomy ().  Bilateral tubal ligation (according to her).  Social history: .  Lives in [place], [place].  Has 2 children.  Children.  Does not work.  Smoked half a pack of cigarettes daily for 40 years; quit 2 weeks ago.  No alcohol or illicit drugs.  Family history: No family history of cancer.  Health maintenance: According to her, screening colonoscopy 5 years ago at [facility], probably revealing a benign polyp.  Menstrual and OB/GYN history: Menarche, age 13.  Menopause, age 59.  G2, P2.  No abortions or miscarriages.  Age at delivery of first child, 22.  Did not breast-feed her children.  Used birth control pills for 1 year, subsequently, bilateral tubal ligation.  No history of hormone replacement therapy.  Past Surgical History:   Procedure Laterality Date    BREAST BIOPSY      CHOLECYSTECTOMY        Social History     Socioeconomic History    Marital status:    Tobacco Use    Smoking status: Former     Types: Cigarettes     Quit date: 3/5/2022     Years since quittin.8    Smokeless tobacco: Never   Substance and Sexual Activity    Alcohol use: Not Currently    Drug use: Never    Sexual activity: Yes      History reviewed. No pertinent family history.     Reason for Follow-up:  -IDC right breast, axillary lymph node biopsy +, presumed triple negative, AJCC stage IIB, clinical prognostic stage IIIB  -liver lesions; adrenal lesions  -History of right lower extremity DVT    -right thyroid lesion       History of Present Illness:   Breast Cancer        Oncologic/Hematologic History:  Oncology History   Breast cancer, right   2022 Cancer Staged    Staging form: Breast, AJCC  8th Edition  - Clinical stage from 1/7/2022: Stage IIIA (cT2, cN1, cM0, G3, ER+, RI-, HER2-)       4/7/2022 Initial Diagnosis    Breast cancer     4/13/2022 - 6/3/2022 Chemotherapy    Treatment Summary   Plan Name: OP BREAST DOSE-DENSE AC - DOXORUBICIN CYCLOPHOSPHAMIDE Q2W  Treatment Goal: Control  Status: Inactive  Start Date: 4/13/2022  End Date: 6/3/2022  Provider: Gurmeet Hatfield MD  Chemotherapy: DOXOrubicin chemo injection 112 mg, 60 mg/m2 = 112 mg, Intravenous, Clinic/HOD 1 time, 4 of 4 cycles  Administration: 112 mg (5/5/2022), 112 mg (5/19/2022), 112 mg (6/2/2022)  cyclophosphamide 600 mg/m2 = 1,120 mg in sodium chloride 0.9% 250 mL chemo infusion, 600 mg/m2 = 1,120 mg, Intravenous, Clinic/HOD 1 time, 4 of 4 cycles  Administration: 1,100 mg (5/5/2022), 1,120 mg (5/19/2022), 1,100 mg (6/2/2022)       8/31/2022 - 10/13/2022 Chemotherapy    Treatment Summary   Plan Name: OP BREAST PACLITAXEL Q2W  Treatment Goal: Control  Status: Inactive  Start Date: 8/31/2022  End Date: 10/13/2022  Provider: Gurmeet Hatfield MD  Chemotherapy: PACLitaxeL (TAXOL) 175 mg/m2 = 312 mg in sodium chloride 0.9% 500 mL chemo infusion, 175 mg/m2 = 312 mg, Intravenous, Clinic/HOD 1 time, 4 of 4 cycles  Administration: 312 mg (8/31/2022), 312 mg (9/14/2022), 312 mg (9/28/2022), 312 mg (10/12/2022)        66-year-old lady referred from surgery, with breast cancer.     She initially presented to emergency department 11/15/2021 for evaluation of breast pain and a palpable lump in the right breast.  Subsequent imaging studies and biopsy established diagnosis of invasive ductal carcinoma of right breast, axillary lymph node positive, ER low positive, RI negative, and HER-2 negative.    Oncologic history:   #IDC right breast, presumed triple negative:   -History of right lower extremity DVT and right breast mass 02/2020; she did not follow-up   -Presentation: 11/2021: Palpable mass   -01/07/2022: Bilateral diagnostic mammogram with  contrast and limited ultrasound right breast   -01/07/2022: Biopsy   -3.1 cm mass on mammogram, overall grade 3, right axillary lymph node biopsy positive   -ER low positive (1%).  AZ negative (0%).  HER2 equivocal (Score 2+).  HER-2 negative by FISH.  Ki-67 high proliferation (50%)   >>>   For the purpose of neoadjuvant chemotherapy, since ER is low positive, we will treat the patient as if triple negative   >>>  -cT2 cN1 MX, grade 3, presumed triple negative, AJCC anatomic stage IIB, clinical prognostic stage IIIB   -02/22/2022: DEXA scan: Normal BMD   -02/22/2022: CT C/A/P with contrast for staging: Right breast mass with right axillary lymphadenopathy; bilateral adrenal nodules, statistically representing adenomas   -02/22/2022: Whole-body nuclear medicine bone scan: No bone metastasis   -03/02/2022: Mediport placed   -03/11/2022: TTE: LVEF 55-60%  -neoadjuvant DD AC started 04/13/2022; severe neutropenia, ANC 0.03, on 04/26/2022; therefore, cycle 2 held; treated with growth factor and prophylactic ciprofloxacin  -cycle 2 on 05/05/2022; cycle 3 on 05/19/2022; cycle 4 on 06/02/2022   -some progression on restaging mammogram and ultrasound 06/17/2022  -06/21/2022:  She was supposed to start neoadjuvant dose dense Taxol but was sent to emergency department for evaluation because she was feeling extremely weak, sick, unable to walk for 2-3 weeks  -06/21/2022:  WBC, differential unremarkable.  Hemoglobin 9.2, stable.  Platelets 210 K. ANC 5.6.  CMP stable and within acceptable limits.  Magnesium normal.  BNP normal.  TSH 2.2180, normal.    -06/21/2022:  V/Q scan:  Normal/very low probability of pulmonary embolism  -07/15/2022:  CT C/A/P with contrast (comparison:  02/22/2022):   Mass in the right breast with associated skin thickening in the right breast overall slightly less prominent than the prior examination.  The right axillary lymphadenopathy is also less prominent than the prior examination   Interval  development of multiple punctate hypoattenuating areas within the liver concerning for possible developing metastatic foci.  Short-term follow-up is recommended.  These nodules are too small to characterize and are all subcentimeter in size.   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  CA 27.29 level normal.  CA 15-3 level normal.  -neoadjuvant dose dense Taxol started 08/31/2022  -neoadjuvant dose dense Taxol: Cycle 1 (08/31/2022); cycle 2 (09/14/2022)  -09/09/2022: Whole-body nuclear medicine bone scan (comparison:  Bone scan 02/22/2022; CT C/A/P 0 07/15/2022): No bone metastasis  -09/19/2022: PET-CT to rule out metastatic disease (comparison:  09/09/2022 bone scan; 07/15/2022 CT C/A/P):  1. Slight interval enlargement of hypermetabolic right breast mass and associated right axillary node.  2. Metastatic right hepatic lesion (central aspect of right liver, 1.9 x 1.2 cm), with no other definite FDG-avid or aggressive appearing process through the neck, chest, abdomen, or pelvis.  3. Subcentimeter [place] foci are less conspicuous and again of limited characterization due to size and non-contrast protocol, but statistically favored to represent benign cysts.  Close attention on surveillance imaging is needed in order to ensure ongoing stability.  4. Incidental right adrenal  adenoma (1.7 x 4.3 cm)  5. Diffuse osseous uptake is favored to reflect sequela of systemic therapeutic agent.  6. Hypermetabolic low-density nodule posterior right hemithyroid, 1.6 x 1.4 cm, maximum SUV 4.6, every -neoadjuvant dose dense Taxol:  Cycle 3 (09/28/2022); cycle 4 (10/12/2022)  -10/17/2022:  Thyroid ultrasound:  1. Multinodular thyroid.  2. The dominant right upper thyroid pole nodule previously demonstrated hypermetabolic activity, warranting biopsy.  3. Dominant central left thyroid nodule meets biopsy criteria.  4. Additional nodules do not meet biopsy criteria secondary to insufficient size/sonographic characteristics; recommendations for ultrasound surveillance provided above.  -10/21/2022:  MRI abdomen with and without contrast (comparison:  09/19/2022):  1. There are 2 small right hepatic lobe lesions suspicious for metastases.  One of these was hypermetabolic on recent PET-CT.  Right lobe lesion 9 mm, corresponding to hypermetabolic lesion on PET-CT.  More inferiorly in right hepatic lobe 6 mm, this was not evident on PET  2. Bilateral adrenal adenomas show no significant change going back to February 2022.  -11/04/2022: IR:  Liver lesion too small for biopsy  -11/04/2022:  Restaging bilateral diagnostic mammogram with contrast and limited ultrasound right breast (comparison:  06/17/2022):  Right breast mass 3.8 x 2.8 x 3.4 cm, previously 3.2 x 3.1 x 3.6 cm (06/17/2022) and 3 x 3.1 x 2.1 cm) 12/21/2021); right axillary lymph node 2.6 x 1 x 1.4 cm, previously 3.6 x 1.3 x 1.3 cm (06/17/2022) and 3.5 x 0.9 x 1.4 cm (12/21/2021) (right breast malignancy with nipple and skin involvement; compared to 06/17/2022, there has been interval development of at least 2 adjacent satellite lesions upper outer right breast and interval progression in the associated calcifications; total satellite lesions measure 3.6 x 5.0 x 5.3 cm, previously 3.8 x 3.5 x 3 cm on 06/17/2022 and 3.5 by 2.6 x 2.8 cm on  01/07/2022)  -12/08/2022:  Ultrasound-guided biopsy, bilateral (FNA): Pathology:  1. Fine needle aspiration of left thyroid nodule: Unsatisfactory. Insufficient cells for diagnostic evaluation.   2. Fine needle aspiration of right thyroid nodule: Cytomorphology  of a benign thyroid nodule.  -01/09/2023: Restaging CTs C/A/P with contrast (comparison:  PET-CT 09/19/2022, MRI abdomen 09/21/2022):   1. Interval progression of disease  2. Slight interval increase in size of right breast mass (4.6 x 3 cm, previously 4 x 3 cm)  3. Interval increase in size of right axillary lymph node (1.9 cm, previously 1.1 cm)  4. Interval increase in size and number of hepatic metastases (largest mass with satellite nodularity right lobe 7.3 x 7.1 cm, previously 2 cm)        02/10/2022:  Presents for initial medical oncology consultation, accompanied by her .     Interval History:  PORT FLUSH   [No matching plan found]   01/23/2023:   -01/09/2023: Restaging CTs C/A/P with contrast (comparison:  PET-CT 09/19/2022, MRI abdomen 09/21/2022):   1. Interval progression of disease  2. Slight interval increase in size of right breast mass (4.6 x 3 cm, previously 4 x 3 cm)  3. Interval increase in size of right axillary lymph node (1.9 cm, previously 1.1 cm)  4. Interval increase in size and number of hepatic metastases (largest mass with satellite nodularity right lobe 7.3 x 7.1 cm, previously 2 cm)  Labs reviewed.    Presents for follow-up visit.  In no acute discomfort.  Apart from some numbness and tingling in fingers and toes, no symptoms whatsoever.  Also, some pain in the right breast, not severe.  No abdominal pain, nausea, vomiting, jaundice, weakness, fatigue, anorexia, unintentional weight loss, etc..  No unusual headaches or focal neurological symptoms.  No bone pains.  No chest pain, cough, or dyspnea.  ECOG 1.      Medications:  Current Outpatient Medications on File Prior to Visit   Medication Sig Dispense Refill     ciprofloxacin HCl (CIPRO) 500 MG tablet SMARTSI Tablet(s) By Mouth Every 12 Hours      dexAMETHasone (DECADRON) 4 MG Tab Take 5 tablets (20 mg total) by mouth As instructed (for chemotherapy). Take 5 tablets (20 mg total) po 12 hours before treatment and then repeat 6 hours before chemotherapy treatment (Patient not taking: Reported on 2022) 30 tablet 0    ondansetron (ZOFRAN) 8 MG tablet Take 1 tablet (8 mg total) by mouth every 8 (eight) hours as needed for Nausea. (Patient not taking: Reported on 2022) 30 tablet 2     No current facility-administered medications on file prior to visit.       Review of Systems:   All systems reviewed and found to be negative except for the symptoms detailed above    Physical Examination:   VITAL SIGNS:   Vitals:    23 1046   BP: 129/78   Pulse: 86   Resp: 20   Temp: 97.7 °F (36.5 °C)     GENERAL:  In no apparent distress.    HEAD:  No signs of head trauma.  EYES:  Pupils are equal.  Extraocular motions intact.    EARS:  Hearing grossly intact.  MOUTH:  Oropharynx is normal.   NECK:  No adenopathy, no JVD.     CHEST:  Chest with clear breath sounds bilaterally.  No wheezes, rales, rhonchi.    CARDIAC:  Regular rate and rhythm.  S1 and S2, without murmurs, gallops, rubs.  VASCULAR:  No Edema.  Peripheral pulses normal and equal in all extremities.  ABDOMEN:  Soft, without detectable tenderness.  No sign of distention.  No   rebound or guarding, and no masses palpated.   Bowel Sounds normal.  MUSCULOSKELETAL:  Good range of motion of all major joints. Extremities without clubbing, cyanosis or edema.    NEUROLOGIC EXAM:  Alert and oriented x 3.  No focal sensory or strength deficits.   Speech normal.  Follows commands.  PSYCHIATRIC:  Mood normal.    No results for input(s): CBC in the last 72 hours.   No results for input(s): CMP in the last 72 hours.     Assessment:  Problem List Items Addressed This Visit          Neuro    Chemotherapy-induced neuropathy        Hematology    History of deep venous thrombosis (DVT) of distal vein of right lower extremity       Oncology    Breast cancer, right - Primary    Liver metastases    Recurrent breast cancer, right       Endocrine    Adrenal nodule    Right thyroid nodule   #IDC right breast, practically triple negative:  To summarize:  -IDC right breast, high-grade, practically triple negative (ER 1%, low +; LA negative; HER2 2+, equivocal; HER2 negative by FISH)  -right breast mass initially noted 02/2020; she did not follow-up;   -biopsy 01/07/2022  -ER low + (1%).  LA negative (0%).  HER2 negative.  Ki-67 high proliferation (50%).  -3.1 cm mass on mammogram.  Grade 3. Right axillary lymph node biopsy +  -cT2 cN1 MX, grade 3, practically triple negative, AJCC anatomic stage IIB, clinical prognostic stage IIIB  -S/P neoadjuvant ddAC x4 (04/13/2022-06/02/2022), with some progression on restaging mammogram and ultrasound 06/17/2022  -S/P neoadjuvant dose dense Taxol x4 (08/31/2022-10/12/2022)  >> progression of right breast lesion but right axillary lymph node smaller on restaging bilateral diagnostic mammogram with contrast and limited ultrasound right breast 11/04/2022  >>>  Radiologic progression, with worsening liver metastasis:  -suspicion of small liver metastases on PET-CT 09/19/2022 and MRI abdomen 10/21/2022 (1.9 x 1.2 cm on PET-CT but 9 mm on MRI); per IR, too small for biopsy  -01/04/2023: Says that the lump in right breast is back  -disease progression on restaging CTs C/A/P 01/09/2023 (enlargement of right breast mass and right axillary lymph node; increase in size and # of liver metastasis, largest mass 7.3 x 7.1 cm, previously 2 cm)  -01/23/2023: No significant symptoms; ECOG 1      # molecular markers:  -breast biopsy (01/07/2022):    ER low + (1%).  LA negative (0%).  HER2 negative.  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 Multinodular thyroid.  2. The dominant right upper thyroid pole nodule previously demonstrated hypermetabolic activity, warranting biopsy.  3. Dominant central left thyroid nodule meets biopsy criteria.  4. Additional nodules do not meet biopsy criteria secondary to insufficient size/sonographic characteristics; recommendations for ultrasound surveillance provided above.  -12/08/2022:  Ultrasound-guided biopsy, bilateral (FNA): Pathology:  1. Fine needle aspiration of left thyroid nodule: Unsatisfactory. Insufficient cells for diagnostic evaluation.   2. Fine needle aspiration of right thyroid nodule: Cytomorphology  of a benign thyroid nodule.        #History of right lower extremity DVT 02/19/2020:  -02/19/2020: (Right leg swelling for 3 days) acute nonocclusive DVT in the right distal femoral vein; acute occlusive DVTs right popliteal and right peroneal veins  -Apparently, at the same time, was also noted to have a breast lump  Which could only be biopsied on 01/07/2022, 2 years later, because of for noncompliance with follow-up (right breast, 3:00, a small lump, nontender, slightly irregular on palpation, not hard or fluctuant, no erythema) (apparently, [facility] ambulatory clinics tried to contact her but without any response )        Plan:  Now, pending biopsy prove, almost definitive evidence of disease progression and worsening liver metastases on restaging CTs 01/09/2023 post completion of neoadjuvant chemotherapy  >>>  Discontinue plans for surgical resection of right breast mass   Rule out bone metastases with whole-body nuclear medicine bone scan  Urgent referral to IR once again for liver biopsy  for histopathologic confirmation   Check baseline tumor markers including CEA, CA 27.29, and CA 15-3 levels  Pending: Genetic testing (ordered 07/29/2022)  Almost triple negative disease; therefore, will avoid palliative endocrine therapy   After histopathology confirmation, will treat with palliative systemic chemotherapy    Follow-up with ENT for thyroid nodules    Follow-up in 2 weeks, after liver biopsy.     Above discussed at length with the patient.  All questions answered.   Discussed labs and scans and gave her copies of relevant records.  Discussed strong possibility of metastatic disease, pending pathologic confirmation.  Discussed that prognosis guarded.  She understands and agrees this plan.       Follow-up:  No follow-ups on file.

## 2023-01-23 ENCOUNTER — OFFICE VISIT (OUTPATIENT)
Dept: HEMATOLOGY/ONCOLOGY | Facility: CLINIC | Age: 68
End: 2023-01-23
Payer: MEDICAID

## 2023-01-23 VITALS
HEART RATE: 86 BPM | WEIGHT: 129 LBS | SYSTOLIC BLOOD PRESSURE: 129 MMHG | HEIGHT: 63 IN | DIASTOLIC BLOOD PRESSURE: 78 MMHG | TEMPERATURE: 98 F | BODY MASS INDEX: 22.86 KG/M2 | RESPIRATION RATE: 20 BRPM | OXYGEN SATURATION: 97 %

## 2023-01-23 DIAGNOSIS — C50.911 MALIGNANT NEOPLASM OF RIGHT FEMALE BREAST, UNSPECIFIED ESTROGEN RECEPTOR STATUS, UNSPECIFIED SITE OF BREAST: Primary | ICD-10-CM

## 2023-01-23 DIAGNOSIS — T45.1X5A CHEMOTHERAPY-INDUCED NEUROPATHY: ICD-10-CM

## 2023-01-23 DIAGNOSIS — E27.8 ADRENAL NODULE: ICD-10-CM

## 2023-01-23 DIAGNOSIS — C50.911 RECURRENT BREAST CANCER, RIGHT: ICD-10-CM

## 2023-01-23 DIAGNOSIS — G62.0 CHEMOTHERAPY-INDUCED NEUROPATHY: ICD-10-CM

## 2023-01-23 DIAGNOSIS — E04.1 RIGHT THYROID NODULE: ICD-10-CM

## 2023-01-23 DIAGNOSIS — C78.7 LIVER METASTASES: ICD-10-CM

## 2023-01-23 DIAGNOSIS — Z86.718 HISTORY OF DEEP VENOUS THROMBOSIS (DVT) OF DISTAL VEIN OF RIGHT LOWER EXTREMITY: ICD-10-CM

## 2023-01-23 PROCEDURE — 99213 OFFICE O/P EST LOW 20 MIN: CPT | Mod: PBBFAC | Performed by: INTERNAL MEDICINE

## 2023-01-23 PROCEDURE — 3008F BODY MASS INDEX DOCD: CPT | Mod: CPTII,,, | Performed by: INTERNAL MEDICINE

## 2023-01-23 PROCEDURE — 1125F AMNT PAIN NOTED PAIN PRSNT: CPT | Mod: CPTII,,, | Performed by: INTERNAL MEDICINE

## 2023-01-23 PROCEDURE — 99214 PR OFFICE/OUTPT VISIT, EST, LEVL IV, 30-39 MIN: ICD-10-PCS | Mod: S$PBB,,, | Performed by: INTERNAL MEDICINE

## 2023-01-23 PROCEDURE — 3078F PR MOST RECENT DIASTOLIC BLOOD PRESSURE < 80 MM HG: ICD-10-PCS | Mod: CPTII,,, | Performed by: INTERNAL MEDICINE

## 2023-01-23 PROCEDURE — 1159F PR MEDICATION LIST DOCUMENTED IN MEDICAL RECORD: ICD-10-PCS | Mod: CPTII,,, | Performed by: INTERNAL MEDICINE

## 2023-01-23 PROCEDURE — 3288F PR FALLS RISK ASSESSMENT DOCUMENTED: ICD-10-PCS | Mod: CPTII,,, | Performed by: INTERNAL MEDICINE

## 2023-01-23 PROCEDURE — 3078F DIAST BP <80 MM HG: CPT | Mod: CPTII,,, | Performed by: INTERNAL MEDICINE

## 2023-01-23 PROCEDURE — 1159F MED LIST DOCD IN RCRD: CPT | Mod: CPTII,,, | Performed by: INTERNAL MEDICINE

## 2023-01-23 PROCEDURE — 99214 OFFICE O/P EST MOD 30 MIN: CPT | Mod: S$PBB,,, | Performed by: INTERNAL MEDICINE

## 2023-01-23 PROCEDURE — 1160F PR REVIEW ALL MEDS BY PRESCRIBER/CLIN PHARMACIST DOCUMENTED: ICD-10-PCS | Mod: CPTII,,, | Performed by: INTERNAL MEDICINE

## 2023-01-23 PROCEDURE — 3074F SYST BP LT 130 MM HG: CPT | Mod: CPTII,,, | Performed by: INTERNAL MEDICINE

## 2023-01-23 PROCEDURE — 1101F PT FALLS ASSESS-DOCD LE1/YR: CPT | Mod: CPTII,,, | Performed by: INTERNAL MEDICINE

## 2023-01-23 PROCEDURE — 3288F FALL RISK ASSESSMENT DOCD: CPT | Mod: CPTII,,, | Performed by: INTERNAL MEDICINE

## 2023-01-23 PROCEDURE — 1101F PR PT FALLS ASSESS DOC 0-1 FALLS W/OUT INJ PAST YR: ICD-10-PCS | Mod: CPTII,,, | Performed by: INTERNAL MEDICINE

## 2023-01-23 PROCEDURE — 1125F PR PAIN SEVERITY QUANTIFIED, PAIN PRESENT: ICD-10-PCS | Mod: CPTII,,, | Performed by: INTERNAL MEDICINE

## 2023-01-23 PROCEDURE — 3074F PR MOST RECENT SYSTOLIC BLOOD PRESSURE < 130 MM HG: ICD-10-PCS | Mod: CPTII,,, | Performed by: INTERNAL MEDICINE

## 2023-01-23 PROCEDURE — 3008F PR BODY MASS INDEX (BMI) DOCUMENTED: ICD-10-PCS | Mod: CPTII,,, | Performed by: INTERNAL MEDICINE

## 2023-01-23 PROCEDURE — 1160F RVW MEDS BY RX/DR IN RCRD: CPT | Mod: CPTII,,, | Performed by: INTERNAL MEDICINE

## 2023-01-23 NOTE — Clinical Note
Orders for today:   Discontinue plans of surgical referral Whole-body nuclear medicine bone scan to rule out bone metastasis ASAP  Urgent referral to IR for liver biopsy for confirmation of liver metastasis  Check CEA, CA 27.29, and CA 15-3 levels  Follow-up visit with me in 2 weeks, after biopsy.

## 2023-01-26 ENCOUNTER — TELEPHONE (OUTPATIENT)
Dept: INTERVENTIONAL RADIOLOGY/VASCULAR | Facility: HOSPITAL | Age: 68
End: 2023-01-26

## 2023-01-30 ENCOUNTER — TELEPHONE (OUTPATIENT)
Dept: INTERVENTIONAL RADIOLOGY/VASCULAR | Facility: HOSPITAL | Age: 68
End: 2023-01-30
Payer: MEDICAID

## 2023-01-30 ENCOUNTER — OFFICE VISIT (OUTPATIENT)
Dept: HEMATOLOGY/ONCOLOGY | Facility: CLINIC | Age: 68
End: 2023-01-30
Payer: MEDICAID

## 2023-01-30 DIAGNOSIS — C50.911 MALIGNANT NEOPLASM OF RIGHT FEMALE BREAST, UNSPECIFIED ESTROGEN RECEPTOR STATUS, UNSPECIFIED SITE OF BREAST: ICD-10-CM

## 2023-01-30 DIAGNOSIS — C78.7 LIVER METASTASES: Primary | ICD-10-CM

## 2023-01-30 DIAGNOSIS — C78.7 LIVER METASTASES: ICD-10-CM

## 2023-01-30 DIAGNOSIS — C50.911 MALIGNANT NEOPLASM OF RIGHT FEMALE BREAST, UNSPECIFIED ESTROGEN RECEPTOR STATUS, UNSPECIFIED SITE OF BREAST: Primary | ICD-10-CM

## 2023-01-30 PROCEDURE — 99214 PR OFFICE/OUTPT VISIT, EST, LEVL IV, 30-39 MIN: ICD-10-PCS | Mod: 95,,, | Performed by: NURSE PRACTITIONER

## 2023-01-30 PROCEDURE — 99214 OFFICE O/P EST MOD 30 MIN: CPT | Mod: 95,,, | Performed by: NURSE PRACTITIONER

## 2023-01-30 NOTE — PROGRESS NOTES
REFERRING PHYSICIAN: Dr. Gurmeet Hatfield        Patient ID: sAia Gallagher is a 67 y.o. female.    Chief Complaint: Personal history of breast cancer (right IDC triple negative) originally diagnosed 66y, with metastatic disease diagnosed 67y. Dr. Mckenzie has requested genetic testing to assist with treatment decision-making. Patient presented via Telemedicine Visit (audio and video) today for risk assessment, genetic counseling, and consideration for genetic testing.    HPI  Past Medical History:   Diagnosis Date    Breast cancer     Liver metastases         Past Surgical History:   Procedure Laterality Date    BREAST BIOPSY      CHOLECYSTECTOMY          ALLERGIES:  Patient has no known allergies.     REVIEW OF SYSTEMS:  [facility]         Oncology History   Oncology History   Breast cancer, right         -IDC right breast, high-grade, practically triple negative (ER 1%, low +; NE negative; HER2 2+, equivocal; HER2 negative by FISH)  -right breast mass initially noted 02/2020; she did not follow-up;   -biopsy 01/07/2022  -ER low + (1%).  NE negative (0%).  HER2 negative.  Ki-67 high proliferation (50%).  -3.1 cm mass on mammogram.  Grade 3. Right axillary lymph node biopsy +  -cT2 cN1 MX, grade 3, practically triple negative, AJCC anatomic stage IIB, clinical prognostic stage IIIB  -S/P neoadjuvant ddAC x4 (04/13/2022-06/02/2022), with some progression on restaging mammogram and ultrasound 06/17/2022  -S/P neoadjuvant dose dense Taxol x4 (08/31/2022-10/12/2022)  >> progression of right breast lesion but right axillary lymph node smaller on restaging bilateral diagnostic mammogram with contrast and limited ultrasound right breast 11/04/2022  -disease progression on restaging CTs C/A/P 01/09/2023 (enlargement of right breast mass and right axillary lymph node; increase in size and # of liver metastasis, largest mass 7.3 x 7.1 cm, previously 2 cm)    Family History   Problem Relation Age of Onset    Cancer Neg Hx          Assessment:     Risk Assessment:  This patient is at increased risk of having an inherited genetic mutation that increases the risk for cancer. She meets criteria for genetic testing based on the National Comprehensive Cancer Network (NCCN) criteria due to a personal history of metastatic breast cancer whereas genetic test result could influence treatment. Based on her likelihood of having a mutation, [facility] cancer panel testing was described.    Education and Counseling:  [facility] evaluates a broad number of hereditary cancer syndromes to help define patients' cancer risk. This cancer panel tests 36 genes with known association to increased cancer risk: APC, EPIFANIO, AXIN2, BARD1, BRCA1, BRCA2, BMPR1A, BRIP1, CDH1, CDK4, CDKN2A, CHEK2, DICER1, HOXB13, EPCAM, GREM1, MLH1, MSH2, MSH6, MUTYH, NBN, NF1, PALB2, PMS2, POLD1, POLE, PTEN, RAD50, RECQL, RAD51C, RAD51D, SMAD4, SMARCA4, STK11, TP53.    If a mutation were found, this patient would have a significantly increased risk for cancer.  Inherited cancer syndromes included in this test, may have different, but still significant risk for cancer.  Risk of cancer with any particular gene mutation will be discussed at the time of results disclosure and based on the results.    The availability of clinical management options for inherited cancer predisposition mutation carriers was discussed. Details of the testing process, including benefits and limitations of genetic analysis as well as the implications of possible test results, were discussed.  Because this patient is the first member of her family to be tested comprehensive panel testing was presented.  Related insurance issues were discussed.      Summary:  This patient was evaluated for hereditary risk of cancer and was found to be at an increased risk of having an inherited cancer predisposition gene mutation.  The option of genetic testing was explained in detail, including the possible  impact of this information on family members.  Since this patient wishes to proceed with testing an order will be placed online with [facility]. Informed consent will be obtained, lab drawn and sent to [facility].  Results will be expected 4 weeks from this time.  A follow-up appointment will be scheduled for results disclosure.       The patient location is: home.     Visit type: audio only    Time with patient: 20 minutes  30 minutes of total time spent on the encounter, which includes face to face time and non-face to face time preparing to see the patient (eg, review of tests), Obtaining and/or reviewing separately obtained history, Documenting clinical information in the electronic or other health record, Independently interpreting results (not separately reported) and communicating results to the patient/family/caregiver, or Care coordination (not separately reported).       Each patient to whom he or she provides medical services by telemedicine is:  (1) informed of the relationship between the physician and patient and the respective role of any other health care provider with respect to management of the patient; and (2) notified that he or she may decline to receive medical services by telemedicine and may withdraw from such care at any time.    SONALI VALDERRAMA, PhD

## 2023-01-31 ENCOUNTER — HOSPITAL ENCOUNTER (OUTPATIENT)
Dept: RADIOLOGY | Facility: HOSPITAL | Age: 68
Discharge: HOME | End: 2023-01-31
Attending: INTERNAL MEDICINE
Payer: MEDICAID

## 2023-01-31 DIAGNOSIS — C50.911 MALIGNANT NEOPLASM OF RIGHT FEMALE BREAST, UNSPECIFIED ESTROGEN RECEPTOR STATUS, UNSPECIFIED SITE OF BREAST: ICD-10-CM

## 2023-01-31 DIAGNOSIS — C78.7 LIVER METASTASES: ICD-10-CM

## 2023-01-31 PROCEDURE — A9503 TC99M MEDRONATE: HCPCS

## 2023-01-31 PROCEDURE — 78306 BONE IMAGING WHOLE BODY: CPT | Mod: TC

## 2023-02-01 ENCOUNTER — TELEPHONE (OUTPATIENT)
Dept: SURGERY | Facility: HOSPITAL | Age: 68
End: 2023-02-01

## 2023-02-02 ENCOUNTER — HOSPITAL ENCOUNTER (OUTPATIENT)
Dept: INTERVENTIONAL RADIOLOGY/VASCULAR | Facility: HOSPITAL | Age: 68
Discharge: HOME | End: 2023-02-02
Attending: INTERNAL MEDICINE
Payer: MEDICAID

## 2023-02-02 VITALS
OXYGEN SATURATION: 100 % | WEIGHT: 127.19 LBS | BODY MASS INDEX: 22.54 KG/M2 | DIASTOLIC BLOOD PRESSURE: 69 MMHG | RESPIRATION RATE: 20 BRPM | HEART RATE: 76 BPM | SYSTOLIC BLOOD PRESSURE: 143 MMHG | TEMPERATURE: 98 F

## 2023-02-02 DIAGNOSIS — C50.911 MALIGNANT NEOPLASM OF RIGHT FEMALE BREAST, UNSPECIFIED ESTROGEN RECEPTOR STATUS, UNSPECIFIED SITE OF BREAST: ICD-10-CM

## 2023-02-02 DIAGNOSIS — C78.7 LIVER METASTASES: ICD-10-CM

## 2023-02-02 DIAGNOSIS — C78.7 SECONDARY MALIGNANT NEOPLASM OF LIVER: ICD-10-CM

## 2023-02-02 DIAGNOSIS — C50.911 RECURRENT BREAST CANCER, RIGHT: ICD-10-CM

## 2023-02-02 LAB
ALBUMIN SERPL-MCNC: 3.1 G/DL (ref 3.4–4.8)
ALBUMIN/GLOB SERPL: 0.9 RATIO (ref 1.1–2)
ALP SERPL-CCNC: 514 UNIT/L (ref 40–150)
ALT SERPL-CCNC: 38 UNIT/L (ref 0–55)
AST SERPL-CCNC: 81 UNIT/L (ref 5–34)
BASOPHILS # BLD AUTO: 0.02 X10(3)/MCL (ref 0–0.2)
BASOPHILS NFR BLD AUTO: 0.3 %
BILIRUBIN DIRECT+TOT PNL SERPL-MCNC: 1.7 MG/DL
BUN SERPL-MCNC: 13.2 MG/DL (ref 9.8–20.1)
CALCIUM SERPL-MCNC: 10.3 MG/DL (ref 8.4–10.2)
CHLORIDE SERPL-SCNC: 108 MMOL/L (ref 98–107)
CO2 SERPL-SCNC: 24 MMOL/L (ref 23–31)
CREAT SERPL-MCNC: 1.08 MG/DL (ref 0.55–1.02)
EOSINOPHIL # BLD AUTO: 0.17 X10(3)/MCL (ref 0–0.9)
EOSINOPHIL NFR BLD AUTO: 2.5 %
ERYTHROCYTE [DISTWIDTH] IN BLOOD BY AUTOMATED COUNT: 14.3 % (ref 11.5–17)
GFR SERPLBLD CREATININE-BSD FMLA CKD-EPI: 57 MLS/MIN/1.73/M2
GLOBULIN SER-MCNC: 3.5 GM/DL (ref 2.4–3.5)
GLUCOSE SERPL-MCNC: 98 MG/DL (ref 82–115)
HCT VFR BLD AUTO: 35.7 % (ref 37–47)
HGB BLD-MCNC: 11.5 GM/DL (ref 12–16)
IMM GRANULOCYTES # BLD AUTO: 0.03 X10(3)/MCL (ref 0–0.04)
IMM GRANULOCYTES NFR BLD AUTO: 0.4 %
LYMPHOCYTES # BLD AUTO: 0.38 X10(3)/MCL (ref 0.6–4.6)
LYMPHOCYTES NFR BLD AUTO: 5.7 %
MCH RBC QN AUTO: 32.2 PG
MCHC RBC AUTO-ENTMCNC: 32.2 MG/DL (ref 33–36)
MCV RBC AUTO: 100 FL (ref 80–94)
MONOCYTES # BLD AUTO: 0.53 X10(3)/MCL (ref 0.1–1.3)
MONOCYTES NFR BLD AUTO: 7.9 %
NEUTROPHILS # BLD AUTO: 5.57 X10(3)/MCL (ref 2.1–9.2)
NEUTROPHILS NFR BLD AUTO: 83.2 %
NRBC BLD AUTO-RTO: 0 %
PLATELET # BLD AUTO: 205 X10(3)/MCL (ref 130–400)
PMV BLD AUTO: 9.8 FL (ref 7.4–10.4)
POTASSIUM SERPL-SCNC: 3.8 MMOL/L (ref 3.5–5.1)
PROT SERPL-MCNC: 6.6 GM/DL (ref 5.8–7.6)
RBC # BLD AUTO: 3.57 X10(6)/MCL (ref 4.2–5.4)
SODIUM SERPL-SCNC: 140 MMOL/L (ref 136–145)
WBC # SPEC AUTO: 6.7 X10(3)/MCL (ref 4.5–11.5)

## 2023-02-02 PROCEDURE — 80053 COMPREHEN METABOLIC PANEL: CPT | Performed by: INTERNAL MEDICINE

## 2023-02-02 PROCEDURE — 47000 NEEDLE BIOPSY OF LIVER PERQ: CPT

## 2023-02-02 PROCEDURE — 85025 COMPLETE CBC W/AUTO DIFF WBC: CPT | Performed by: INTERNAL MEDICINE

## 2023-02-02 PROCEDURE — 63600175 PHARM REV CODE 636 W HCPCS

## 2023-02-02 PROCEDURE — 63600175 PHARM REV CODE 636 W HCPCS: Performed by: RADIOLOGY

## 2023-02-02 PROCEDURE — 30000890 GENERAL MISCELLANEOUS TEST (BEAKER): Mod: 90 | Performed by: PATHOLOGY

## 2023-02-02 PROCEDURE — 88360 TUMOR IMMUNOHISTOCHEM/MANUAL: CPT | Mod: 90 | Performed by: PATHOLOGY

## 2023-02-02 PROCEDURE — 85610 PROTHROMBIN TIME: CPT | Performed by: INTERNAL MEDICINE

## 2023-02-02 PROCEDURE — 88307 TISSUE EXAM BY PATHOLOGIST: CPT | Performed by: INTERNAL MEDICINE

## 2023-02-02 PROCEDURE — 25000003 PHARM REV CODE 250: Performed by: RADIOLOGY

## 2023-02-02 PROCEDURE — 99152 MOD SED SAME PHYS/QHP 5/>YRS: CPT | Performed by: RADIOLOGY

## 2023-02-02 RX ORDER — LIDOCAINE HYDROCHLORIDE 10 MG/ML
INJECTION INFILTRATION; PERINEURAL
Status: COMPLETED | OUTPATIENT
Start: 2023-02-02 | End: 2023-02-02

## 2023-02-02 RX ORDER — SODIUM CHLORIDE 0.9 % (FLUSH) 0.9 %
10 SYRINGE (ML) INJECTION
Status: DISCONTINUED | OUTPATIENT
Start: 2023-02-02 | End: 2023-02-03 | Stop reason: HOSPADM

## 2023-02-02 RX ORDER — MIDAZOLAM HYDROCHLORIDE 1 MG/ML
INJECTION INTRAMUSCULAR; INTRAVENOUS
Status: COMPLETED | OUTPATIENT
Start: 2023-02-02 | End: 2023-02-02

## 2023-02-02 RX ORDER — FENTANYL CITRATE 50 UG/ML
INJECTION, SOLUTION INTRAMUSCULAR; INTRAVENOUS
Status: COMPLETED | OUTPATIENT
Start: 2023-02-02 | End: 2023-02-02

## 2023-02-02 RX ADMIN — LIDOCAINE HYDROCHLORIDE 5 ML: 10 INJECTION, SOLUTION INFILTRATION; PERINEURAL at 08:02

## 2023-02-02 RX ADMIN — FENTANYL CITRATE 25 MCG: 50 INJECTION, SOLUTION INTRAMUSCULAR; INTRAVENOUS at 08:02

## 2023-02-02 RX ADMIN — MIDAZOLAM 0.5 MG: 1 INJECTION INTRAMUSCULAR; INTRAVENOUS at 08:02

## 2023-02-02 NOTE — PATIENT INSTRUCTIONS
`Take it easy for the next 24 hours since you have had anesthesia sedation.  Resume home medications unless otherwise instructed by your doctor.     ` Be sure to stay hydrated.      `No bending, straining or or heavy lifting over 15 pounds for 1 week.      `You may remove your bandaid in 24 hours and shower then.      `Notify MD if you are running fever over 100.4, see any reddness or foul smelling discharge from biopsy area.      `Go to your nearest ER or call 911 if you have any difficulty breathing or notice swelling or bleeding in your side. Thank you for choosing [facility] for your care and it was my pleasure taking care of you.  [phone]

## 2023-02-02 NOTE — DISCHARGE SUMMARY
Radiology Post-Procedure Note    Pre Op Diagnosis: right liver mass    Post Op Diagnosis: right liver mass    Procedure: right liver biopsy    Procedure performed by: Monica    Written Informed Consent Obtained: Yes    Specimen Removed: YES     Estimated Blood Loss: Minimal    Findings:   Standard CT Biopsy    Patient tolerated procedure well.    @SIG@

## 2023-02-02 NOTE — H&P
Radiology History & Physical      SUBJECTIVE:     Chief Complaint: Liver Lesions    History of Present Illness:  Asia Gallagher is a 67 y.o. female who presents for Biopsy  Past Medical History:   Diagnosis Date    Breast cancer     Liver metastases      Past Surgical History:   Procedure Laterality Date    BREAST BIOPSY      CHOLECYSTECTOMY         Home Meds:   Prior to Admission medications    Medication Sig Start Date End Date Taking? Authorizing Provider   dexAMETHasone (DECADRON) 4 MG Tab Take 5 tablets (20 mg total) by mouth As instructed (for chemotherapy). Take 5 tablets (20 mg total) po 12 hours before treatment and then repeat 6 hours before chemotherapy treatment  Patient not taking: Reported on 11/29/2022 9/1/22   FERMÍN Villareal   ondansetron (ZOFRAN) 8 MG tablet Take 1 tablet (8 mg total) by mouth every 8 (eight) hours as needed for Nausea.  Patient not taking: Reported on 11/29/2022 10/12/22 10/12/23  Tawanda Ramirez NP     Anticoagulants/Antiplatelets: no anticoagulation    Allergies: Review of patient's allergies indicates:  No Known Allergies  Sedation History:  no adverse reactions    Review of Systems:   Hematological: no known coagulopathies  Respiratory: no shortness of breath  Cardiovascular: no chest pain  Gastrointestinal: no abdominal pain  Genito-Urinary: no dysuria  Musculoskeletal: negative  Neurological: no TIA or stroke symptoms         OBJECTIVE:     Vital Signs (Most Recent)  Temp: 97.8 °F (36.6 °C) (02/02/23 0734)  Pulse: 69 (02/02/23 0905)  Resp: (!) 22 (02/02/23 0905)  BP: 131/81 (02/02/23 0905)  SpO2: 99 % (02/02/23 0905)    Physical Exam:  ASA: 2    General: no acute distress  Mental Status: alert and oriented to person, place and time  HEENT: normocephalic, atraumatic  Chest: unlabored breathing  Heart: regular heart rate  Abdomen: nondistended  Extremity: moves all extremities    Laboratory  Lab Results   Component Value Date    INR 1.12 02/02/2023       Lab Results    Component Value Date    WBC 6.7 02/02/2023    HGB 11.5 (L) 02/02/2023    HCT 35.7 (L) 02/02/2023    .0 (H) 02/02/2023     02/02/2023      Lab Results   Component Value Date     02/02/2023    K 3.8 02/02/2023    CO2 24 02/02/2023    BUN 13.2 02/02/2023    CREATININE 1.08 (H) 02/02/2023    CALCIUM 10.3 (H) 02/02/2023    MG 2.10 10/27/2022    ALT 38 02/02/2023    AST 81 (H) 02/02/2023    ALBUMIN 3.1 (L) 02/02/2023    BILITOT 1.7 (H) 02/02/2023    BILIDIR 0.2 05/04/2022       ASSESSMENT/PLAN:     Sedation Plan: Moderate  Patient will undergo Liver Biopsy.    Juan Luis Anderson MD

## 2023-02-02 NOTE — DISCHARGE INSTRUCTIONS
Keep all follow-up appointments. Your referring doctor will give you your biopsy results when they are ready.    · Keep bandage clean and dry until tomorrow evening. Then you may remove it and wash site gently with soap and water.    · No driving or consuming alcohol for the next 24 hours.    · Call 911 or report to your nearest emergency room with any abdominal pain, changes in the color of your abdomen, or bleeding from the biopsy site.    · Notify MD of any moderate to severe pain unrelieved by pain medicine or for any signs of infection including fever above 100.4, excessive redness or swelling, yellow/green foul- smelling drainage, nausea or vomiting. Call your referring doctor, or call 911/ report to emergency room.    · Thanks for choosing [facility]! Have a smooth recovery!

## 2023-02-08 PROBLEM — R59.0 AXILLARY LYMPHADENOPATHY: Status: ACTIVE | Noted: 2023-02-08

## 2023-02-08 PROBLEM — C78.7 LIVER METASTASIS: Status: ACTIVE | Noted: 2023-02-08

## 2023-02-09 ENCOUNTER — OFFICE VISIT (OUTPATIENT)
Dept: HEMATOLOGY/ONCOLOGY | Facility: CLINIC | Age: 68
End: 2023-02-09
Payer: MEDICAID

## 2023-02-09 VITALS
SYSTOLIC BLOOD PRESSURE: 105 MMHG | RESPIRATION RATE: 20 BRPM | BODY MASS INDEX: 22.68 KG/M2 | HEART RATE: 81 BPM | HEIGHT: 63 IN | WEIGHT: 128 LBS | TEMPERATURE: 98 F | DIASTOLIC BLOOD PRESSURE: 67 MMHG | OXYGEN SATURATION: 100 %

## 2023-02-09 DIAGNOSIS — E04.1 RIGHT THYROID NODULE: ICD-10-CM

## 2023-02-09 DIAGNOSIS — C50.911 RECURRENT BREAST CANCER, RIGHT: ICD-10-CM

## 2023-02-09 DIAGNOSIS — E27.8 ADRENAL NODULE: ICD-10-CM

## 2023-02-09 DIAGNOSIS — C78.7 LIVER METASTASES: ICD-10-CM

## 2023-02-09 DIAGNOSIS — C78.7 LIVER METASTASES: Primary | ICD-10-CM

## 2023-02-09 DIAGNOSIS — C50.911 MALIGNANT NEOPLASM OF RIGHT FEMALE BREAST, UNSPECIFIED ESTROGEN RECEPTOR STATUS, UNSPECIFIED SITE OF BREAST: Primary | ICD-10-CM

## 2023-02-09 DIAGNOSIS — N64.4 PAIN OF RIGHT BREAST: ICD-10-CM

## 2023-02-09 DIAGNOSIS — M54.50 RIGHT-SIDED LOW BACK PAIN WITHOUT SCIATICA, UNSPECIFIED CHRONICITY: ICD-10-CM

## 2023-02-09 DIAGNOSIS — Z86.718 HISTORY OF DEEP VENOUS THROMBOSIS (DVT) OF DISTAL VEIN OF RIGHT LOWER EXTREMITY: ICD-10-CM

## 2023-02-09 DIAGNOSIS — K76.9 LESION OF LIVER: ICD-10-CM

## 2023-02-09 DIAGNOSIS — E04.2 MULTINODULAR GOITER: ICD-10-CM

## 2023-02-09 DIAGNOSIS — G62.0 CHEMOTHERAPY-INDUCED NEUROPATHY: ICD-10-CM

## 2023-02-09 DIAGNOSIS — T45.1X5A CHEMOTHERAPY-INDUCED NEUROPATHY: ICD-10-CM

## 2023-02-09 DIAGNOSIS — R59.0 AXILLARY LYMPHADENOPATHY: ICD-10-CM

## 2023-02-09 DIAGNOSIS — C50.911 MALIGNANT NEOPLASM OF RIGHT FEMALE BREAST, UNSPECIFIED ESTROGEN RECEPTOR STATUS, UNSPECIFIED SITE OF BREAST: ICD-10-CM

## 2023-02-09 PROCEDURE — 3078F PR MOST RECENT DIASTOLIC BLOOD PRESSURE < 80 MM HG: ICD-10-PCS | Mod: CPTII,,, | Performed by: INTERNAL MEDICINE

## 2023-02-09 PROCEDURE — 1101F PT FALLS ASSESS-DOCD LE1/YR: CPT | Mod: CPTII,,, | Performed by: INTERNAL MEDICINE

## 2023-02-09 PROCEDURE — 99215 PR OFFICE/OUTPT VISIT, EST, LEVL V, 40-54 MIN: ICD-10-PCS | Mod: S$PBB,,, | Performed by: INTERNAL MEDICINE

## 2023-02-09 PROCEDURE — 3008F BODY MASS INDEX DOCD: CPT | Mod: CPTII,,, | Performed by: INTERNAL MEDICINE

## 2023-02-09 PROCEDURE — 1159F PR MEDICATION LIST DOCUMENTED IN MEDICAL RECORD: ICD-10-PCS | Mod: CPTII,,, | Performed by: INTERNAL MEDICINE

## 2023-02-09 PROCEDURE — 1101F PR PT FALLS ASSESS DOC 0-1 FALLS W/OUT INJ PAST YR: ICD-10-PCS | Mod: CPTII,,, | Performed by: INTERNAL MEDICINE

## 2023-02-09 PROCEDURE — 1160F RVW MEDS BY RX/DR IN RCRD: CPT | Mod: CPTII,,, | Performed by: INTERNAL MEDICINE

## 2023-02-09 PROCEDURE — 1125F PR PAIN SEVERITY QUANTIFIED, PAIN PRESENT: ICD-10-PCS | Mod: CPTII,,, | Performed by: INTERNAL MEDICINE

## 2023-02-09 PROCEDURE — 1125F AMNT PAIN NOTED PAIN PRSNT: CPT | Mod: CPTII,,, | Performed by: INTERNAL MEDICINE

## 2023-02-09 PROCEDURE — 3288F PR FALLS RISK ASSESSMENT DOCUMENTED: ICD-10-PCS | Mod: CPTII,,, | Performed by: INTERNAL MEDICINE

## 2023-02-09 PROCEDURE — 1159F MED LIST DOCD IN RCRD: CPT | Mod: CPTII,,, | Performed by: INTERNAL MEDICINE

## 2023-02-09 PROCEDURE — 3288F FALL RISK ASSESSMENT DOCD: CPT | Mod: CPTII,,, | Performed by: INTERNAL MEDICINE

## 2023-02-09 PROCEDURE — 3074F SYST BP LT 130 MM HG: CPT | Mod: CPTII,,, | Performed by: INTERNAL MEDICINE

## 2023-02-09 PROCEDURE — 3074F PR MOST RECENT SYSTOLIC BLOOD PRESSURE < 130 MM HG: ICD-10-PCS | Mod: CPTII,,, | Performed by: INTERNAL MEDICINE

## 2023-02-09 PROCEDURE — 3078F DIAST BP <80 MM HG: CPT | Mod: CPTII,,, | Performed by: INTERNAL MEDICINE

## 2023-02-09 PROCEDURE — 99213 OFFICE O/P EST LOW 20 MIN: CPT | Mod: PBBFAC | Performed by: INTERNAL MEDICINE

## 2023-02-09 PROCEDURE — 3008F PR BODY MASS INDEX (BMI) DOCUMENTED: ICD-10-PCS | Mod: CPTII,,, | Performed by: INTERNAL MEDICINE

## 2023-02-09 PROCEDURE — 1160F PR REVIEW ALL MEDS BY PRESCRIBER/CLIN PHARMACIST DOCUMENTED: ICD-10-PCS | Mod: CPTII,,, | Performed by: INTERNAL MEDICINE

## 2023-02-09 PROCEDURE — 99215 OFFICE O/P EST HI 40 MIN: CPT | Mod: S$PBB,,, | Performed by: INTERNAL MEDICINE

## 2023-02-09 RX ORDER — HEPARIN 100 UNIT/ML
500 SYRINGE INTRAVENOUS
Status: CANCELLED | OUTPATIENT
Start: 2023-02-24

## 2023-02-09 RX ORDER — ONDANSETRON 2 MG/ML
8 INJECTION INTRAMUSCULAR; INTRAVENOUS
Status: CANCELLED | OUTPATIENT
Start: 2023-02-13

## 2023-02-09 RX ORDER — HYDROCODONE BITARTRATE AND ACETAMINOPHEN 5; 300 MG/1; MG/1
1 TABLET ORAL EVERY 4 HOURS PRN
Qty: 30 EACH | Refills: 0 | Status: SHIPPED | OUTPATIENT
Start: 2023-02-09 | End: 2023-02-20 | Stop reason: SDUPTHER

## 2023-02-09 RX ORDER — ONDANSETRON 2 MG/ML
8 INJECTION INTRAMUSCULAR; INTRAVENOUS
Status: CANCELLED | OUTPATIENT
Start: 2023-02-24

## 2023-02-09 RX ORDER — SODIUM CHLORIDE 0.9 % (FLUSH) 0.9 %
10 SYRINGE (ML) INJECTION
Status: CANCELLED | OUTPATIENT
Start: 2023-02-13

## 2023-02-09 RX ORDER — SODIUM CHLORIDE 0.9 % (FLUSH) 0.9 %
10 SYRINGE (ML) INJECTION
Status: CANCELLED | OUTPATIENT
Start: 2023-02-24

## 2023-02-09 RX ORDER — HEPARIN 100 UNIT/ML
500 SYRINGE INTRAVENOUS
Status: CANCELLED | OUTPATIENT
Start: 2023-02-13

## 2023-02-09 NOTE — Clinical Note
Orders for today:   Check liver biopsy for ER, PA, HER2 status Check liver biopsy for NGS testing Needs genetic testing ASAP  Test liver biopsy for PIK3CA mutation, ESR 1 mutation, NTRK gene fusion, MSI, MMR, tumor mutational burden, and RET fusion, somatic BRCA1/2 mutations  Brain MRI with and without contrast to rule out brain metastasis  Start chemotherapy with eribulin Chemo teaching with nursing staff within a week During chemotherapy, check CBC and CMP prior to each chemotherapy dose  Check CEA, CA 15-3, and CA 27.29 level every month  Re-stage with contrast enhanced CT scans of C/A/P in a week, to establish a baseline before chemotherapy  Follow-up with NP within a week for chemotherapy teaching  Follow-up visit with me in 3 weeks

## 2023-02-09 NOTE — PROGRESS NOTES
Past Medical History:   Diagnosis Date    Breast cancer     Liver metastases    Past medical history: Obesity.  Tobacco abuse.   -2020: Acute nonocclusive DVT in the right distal femoral vein; acute occlusive DVTs right popliteal and right peroneal veins ((she never followed up on that)  Procedure/surgical history: Cholecystectomy ().  Bilateral tubal ligation (according to her).  Social history: .  Lives in [place], [place].  Has 2 children.  Children.  Does not work.  Smoked half a pack of cigarettes daily for 40 years; quit 2 weeks ago.  No alcohol or illicit drugs.  Family history: No family history of cancer.  Health maintenance: According to her, screening colonoscopy 5 years ago at [facility], probably revealing a benign polyp.  Menstrual and OB/GYN history: Menarche, age 13.  Menopause, age 59.  G2, P2.  No abortions or miscarriages.  Age at delivery of first child, 22.  Did not breast-feed her children.  Used birth control pills for 1 year, subsequently, bilateral tubal ligation.  No history of hormone replacement therapy.  Past Surgical History:   Procedure Laterality Date    BREAST BIOPSY      CHOLECYSTECTOMY        Social History     Socioeconomic History    Marital status:    Tobacco Use    Smoking status: Former     Packs/day: 0.25     Years: 40.00     Pack years: 10.00     Types: Cigarettes     Quit date: 3/5/2022     Years since quittin.9    Smokeless tobacco: Never   Substance and Sexual Activity    Alcohol use: Not Currently    Drug use: Never    Sexual activity: Yes      Family History   Problem Relation Age of Onset    Cancer Neg Hx         Reason for Follow-up:  -IDC right breast, axillary lymph node biopsy +, presumed triple negative, AJCC stage IIB, clinical prognostic stage IIIB  -liver lesions; adrenal lesions  -History of right lower extremity DVT    -right thyroid lesion       History of Present Illness:   Breast Cancer        Oncologic/Hematologic  History:  Oncology History   Breast cancer, right   1/7/2022 Cancer Staged    Staging form: Breast, AJCC 8th Edition  - Clinical stage from 1/7/2022: Stage IIIA (cT2, cN1, cM0, G3, ER+, FL-, HER2-)       4/7/2022 Initial Diagnosis    Breast cancer     4/13/2022 - 6/3/2022 Chemotherapy    Treatment Summary   Plan Name: OP BREAST DOSE-DENSE AC - DOXORUBICIN CYCLOPHOSPHAMIDE Q2W  Treatment Goal: Control  Status: Inactive  Start Date: 4/13/2022  End Date: 6/3/2022  Provider: Gurmeet Hatfield MD  Chemotherapy: DOXOrubicin chemo injection 112 mg, 60 mg/m2 = 112 mg, Intravenous, Clinic/HOD 1 time, 4 of 4 cycles  Administration: 112 mg (5/5/2022), 112 mg (5/19/2022), 112 mg (6/2/2022)  cyclophosphamide 600 mg/m2 = 1,120 mg in sodium chloride 0.9% 250 mL chemo infusion, 600 mg/m2 = 1,120 mg, Intravenous, Clinic/HOD 1 time, 4 of 4 cycles  Administration: 1,100 mg (5/5/2022), 1,120 mg (5/19/2022), 1,100 mg (6/2/2022)       8/31/2022 - 10/13/2022 Chemotherapy    Treatment Summary   Plan Name: OP BREAST PACLITAXEL Q2W  Treatment Goal: Control  Status: Inactive  Start Date: 8/31/2022  End Date: 10/13/2022  Provider: Gurmeet Hatfield MD  Chemotherapy: PACLitaxeL (TAXOL) 175 mg/m2 = 312 mg in sodium chloride 0.9% 500 mL chemo infusion, 175 mg/m2 = 312 mg, Intravenous, Clinic/HOD 1 time, 4 of 4 cycles  Administration: 312 mg (8/31/2022), 312 mg (9/14/2022), 312 mg (9/28/2022), 312 mg (10/12/2022)       2/13/2023 -  Chemotherapy    Treatment Summary   Plan Name: OP ERIBULIN Q3W  Treatment Goal: Palliative  Status: Active  Start Date: 2/13/2023 (Planned)  End Date: 10/9/2023 (Planned)  Provider: Gurmeet Hatfield MD  Chemotherapy: eriBULin (HALAVEN) 2.25 mg in sodium chloride 0.9% 104.5 mL IVPB, 1.4 mg/m2 = 2.25 mg, Intravenous, Clinic/HOD 1 time, 0 of 12 cycles       Liver metastasis   2/8/2023 Initial Diagnosis    Liver metastasis     2/13/2023 -  Chemotherapy    Treatment Summary   Plan Name: OP ERIBULIN Q3W  Treatment Goal:  Palliative  Status: Active  Start Date: 2/13/2023 (Planned)  End Date: 10/9/2023 (Planned)  Provider: Gurmeet Hatfield MD  Chemotherapy: eriBULin (HALAVEN) 2.25 mg in sodium chloride 0.9% 104.5 mL IVPB, 1.4 mg/m2 = 2.25 mg, Intravenous, Clinic/HOD 1 time, 0 of 12 cycles        66-year-old lady referred from surgery, with breast cancer.     She initially presented to emergency department 11/15/2021 for evaluation of breast pain and a palpable lump in the right breast.  Subsequent imaging studies and biopsy established diagnosis of invasive ductal carcinoma of right breast, axillary lymph node positive, ER low positive, KS negative, and HER-2 negative.    Oncologic history:   #IDC right breast, presumed triple negative:   -History of right lower extremity DVT and right breast mass 02/2020; she did not follow-up   -Presentation: 11/2021: Palpable mass   -01/07/2022: Bilateral diagnostic mammogram with contrast and limited ultrasound right breast   -01/07/2022: Biopsy   -3.1 cm mass on mammogram, overall grade 3, right axillary lymph node biopsy positive   -ER low positive (1%).  KS negative (0%).  HER2 equivocal (Score 2+).  HER-2 negative by FISH.  Ki-67 high proliferation (50%)   >>>   For the purpose of neoadjuvant chemotherapy, since ER is low positive, we will treat the patient as if triple negative   >>>  -cT2 cN1 MX, grade 3, presumed triple negative, AJCC anatomic stage IIB, clinical prognostic stage IIIB   -02/22/2022: DEXA scan: Normal BMD   -02/22/2022: CT C/A/P with contrast for staging: Right breast mass with right axillary lymphadenopathy; bilateral adrenal nodules, statistically representing adenomas   -02/22/2022: Whole-body nuclear medicine bone scan: No bone metastasis   -03/02/2022: Mediport placed   -03/11/2022: TTE: LVEF 55-60%  -neoadjuvant DD AC started 04/13/2022; severe neutropenia, ANC 0.03, on 04/26/2022; therefore, cycle 2 held; treated with growth factor and prophylactic  ciprofloxacin  -cycle 2 on 05/05/2022; cycle 3 on 05/19/2022; cycle 4 on 06/02/2022   -some progression on restaging mammogram and ultrasound 06/17/2022  -06/21/2022:  She was supposed to start neoadjuvant dose dense Taxol but was sent to emergency department for evaluation because she was feeling extremely weak, sick, unable to walk for 2-3 weeks  -06/21/2022:  WBC, differential unremarkable.  Hemoglobin 9.2, stable.  Platelets 210 K. ANC 5.6.  CMP stable and within acceptable limits.  Magnesium normal.  BNP normal.  TSH 2.2180, normal.    -06/21/2022:  V/Q scan:  Normal/very low probability of pulmonary embolism  -07/15/2022:  CT C/A/P with contrast (comparison:  02/22/2022):   Mass in the right breast with associated skin thickening in the right breast overall slightly less prominent than the prior examination.  The right axillary lymphadenopathy is also less prominent than the prior examination   Interval development of multiple punctate hypoattenuating areas within the liver concerning for possible developing metastatic foci.  Short-term follow-up is recommended.  These nodules are too small to characterize and are all subcentimeter in size.   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  CA 27.29 level normal.  CA 15-3 level normal.  -neoadjuvant dose dense Taxol started  08/31/2022  -neoadjuvant dose dense Taxol: Cycle 1 (08/31/2022); cycle 2 (09/14/2022)  -09/09/2022: Whole-body nuclear medicine bone scan (comparison:  Bone scan 02/22/2022; CT C/A/P 0 07/15/2022): No bone metastasis  -09/19/2022: PET-CT to rule out metastatic disease (comparison:  09/09/2022 bone scan; 07/15/2022 CT C/A/P):  1. Slight interval enlargement of hypermetabolic right breast mass and associated right axillary node.  2. Metastatic right hepatic lesion (central aspect of right liver, 1.9 x 1.2 cm), with no other definite FDG-avid or aggressive appearing process through the neck, chest, abdomen, or pelvis.  3. Subcentimeter [place] foci are less conspicuous and again of limited characterization due to size and non-contrast protocol, but statistically favored to represent benign cysts.  Close attention on surveillance imaging is needed in order to ensure ongoing stability.  4. Incidental right adrenal adenoma (1.7 x 4.3 cm)  5. Diffuse osseous uptake is favored to reflect sequela of systemic therapeutic agent.  6. Hypermetabolic low-density nodule posterior right hemithyroid, 1.6 x 1.4 cm, maximum SUV 4.6, every -neoadjuvant dose dense Taxol:  Cycle 3 (09/28/2022); cycle 4 (10/12/2022)  -10/17/2022:  Thyroid ultrasound:  1. Multinodular thyroid.  2. The dominant right upper thyroid pole nodule previously demonstrated hypermetabolic activity, warranting biopsy.  3. Dominant central left thyroid nodule meets biopsy criteria.  4. Additional nodules do not meet biopsy criteria secondary to insufficient size/sonographic characteristics; recommendations for ultrasound surveillance provided above.  -10/21/2022:  MRI abdomen with and without contrast (comparison:  09/19/2022):  1. There are 2 small right hepatic lobe lesions suspicious for metastases.  One of these was hypermetabolic on recent PET-CT.  Right lobe lesion 9 mm, corresponding to hypermetabolic lesion on PET-CT.  More inferiorly in right hepatic lobe 6  mm, this was not evident on PET  2. Bilateral adrenal adenomas show no significant change going back to February 2022.  -11/04/2022: IR:  Liver lesion too small for biopsy  -11/04/2022:  Restaging bilateral diagnostic mammogram with contrast and limited ultrasound right breast (comparison:  06/17/2022):  Right breast mass 3.8 x 2.8 x 3.4 cm, previously 3.2 x 3.1 x 3.6 cm (06/17/2022) and 3 x 3.1 x 2.1 cm) 12/21/2021); right axillary lymph node 2.6 x 1 x 1.4 cm, previously 3.6 x 1.3 x 1.3 cm (06/17/2022) and 3.5 x 0.9 x 1.4 cm (12/21/2021) (right breast malignancy with nipple and skin involvement; compared to 06/17/2022, there has been interval development of at least 2 adjacent satellite lesions upper outer right breast and interval progression in the associated calcifications; total satellite lesions measure 3.6 x 5.0 x 5.3 cm, previously 3.8 x 3.5 x 3 cm on 06/17/2022 and 3.5 by 2.6 x 2.8 cm on 01/07/2022)  -12/08/2022:  Ultrasound-guided biopsy, bilateral (FNA): Pathology:  1. Fine needle aspiration of left thyroid nodule: Unsatisfactory. Insufficient cells for diagnostic evaluation.   2. Fine needle aspiration of right thyroid nodule: Cytomorphology  of a benign thyroid nodule.  -01/09/2023: Restaging CTs C/A/P with contrast (comparison:  PET-CT 09/19/2022, MRI abdomen 09/21/2022):   1. Interval progression of disease  2. Slight interval increase in size of right breast mass (4.6 x 3 cm, previously 4 x 3 cm)  3. Interval increase in size of right axillary lymph node (1.9 cm, previously 1.1 cm)  4. Interval increase in size and number of hepatic metastases (largest mass with satellite nodularity right lobe 7.3 x 7.1 cm, previously 2 cm)  -01/30/2023:  Ultrasound soft tissues of the neck pelvic comparison:  10/17/2022):   The hypermetabolic right thyroid lobe upper pole dominant 1.8 cm diameter isoechoic nodule appear slightly enlarged from prior 1.5 cm.   Grossly stable 1.6 cm diameter cystic and solid left  thyroid lobe nodule with partially obscured borders, defer to prior FNA results.  -01/31/2023: Restaging whole-body nuclear medicine bone scan pelvic comparison: CT C/A/P 0 01/09/2023):  No bone metastasis  -02/02/2023:  CT-guided right liver biopsy: Pathology:  Metastatic carcinoma, consistent with a breast primary  -01/23/2023:  CEA 5.10, elevated; CA 27.29 level 49.1, elevated; CA 15-3 level 22, normal        02/10/2022:  Presents for initial medical oncology consultation, accompanied by her .     Interval History:  PORT FLUSH   OP ERIBULIN Q3W   02/09/2023:   -01/30/2023:  Ultrasound soft tissues of the neck pelvic comparison:  10/17/2022):   The hypermetabolic right thyroid lobe upper pole dominant 1.8 cm diameter isoechoic nodule appear slightly enlarged from prior 1.5 cm.   Grossly stable 1.6 cm diameter cystic and solid left thyroid lobe nodule with partially obscured borders, defer to prior FNA results.  -01/31/2023: Restaging whole-body nuclear medicine bone scan pelvic comparison: CT C/A/P 0 01/09/2023):  No bone metastasis  -02/02/2023:  CT-guided right liver biopsy: Pathology:  Metastatic carcinoma, consistent with a breast primary  -01/23/2023:  CEA 5.10, elevated; CA 27.29 level 49.1, elevated; CA 15-3 level 22, normal  Presents for a follow-up visit.  In no acute discomfort.  Reports some pain in the right breast and in the right side of back, 5/10 severity.  Has been taking ibuprofen, without much relief.  We will start her on Norco 5 mg every 4 hours PRN.  Appetite is so-so but she does not want to try any appetite stimulant.  No unusual headaches, focal neurological symptoms, chest pain, cough, dyspnea, abdominal pain, nausea, vomiting, jaundice, fevers, or chills.  ECOG 1.  Some numbness and tingling in hands and feet but not severe; does not interfere with daily activities.      Medications:  Current Outpatient Medications on File Prior to Visit   Medication Sig Dispense Refill     dexAMETHasone (DECADRON) 4 MG Tab Take 5 tablets (20 mg total) by mouth As instructed (for chemotherapy). Take 5 tablets (20 mg total) po 12 hours before treatment and then repeat 6 hours before chemotherapy treatment (Patient not taking: Reported on 11/29/2022) 30 tablet 0    ondansetron (ZOFRAN) 8 MG tablet Take 1 tablet (8 mg total) by mouth every 8 (eight) hours as needed for Nausea. (Patient not taking: Reported on 11/29/2022) 30 tablet 2     No current facility-administered medications on file prior to visit.       Review of Systems:   All systems reviewed and found to be negative except for the symptoms detailed above    Physical Examination:   VITAL SIGNS:   Vitals:    02/09/23 1016   BP: 105/67   Pulse: 81   Resp: 20   Temp: 97.8 °F (36.6 °C)       GENERAL:  In no apparent distress.    HEAD:  No signs of head trauma.  EYES:  Pupils are equal.  Extraocular motions intact.    EARS:  Hearing grossly intact.  MOUTH:  Oropharynx is normal.   NECK:  No adenopathy, no JVD.     CHEST:  Chest with clear breath sounds bilaterally.  No wheezes, rales, rhonchi.    CARDIAC:  Regular rate and rhythm.  S1 and S2, without murmurs, gallops, rubs.  VASCULAR:  No Edema.  Peripheral pulses normal and equal in all extremities.  ABDOMEN:  Soft, without detectable tenderness.  No sign of distention.  No   rebound or guarding, and no masses palpated.   Bowel Sounds normal.  MUSCULOSKELETAL:  Good range of motion of all major joints. Extremities without clubbing, cyanosis or edema.    NEUROLOGIC EXAM:  Alert and oriented x 3.  No focal sensory or strength deficits.   Speech normal.  Follows commands.  PSYCHIATRIC:  Mood normal.    No results for input(s): CBC in the last 72 hours.   No results for input(s): CMP in the last 72 hours.     Assessment:  Problem List Items Addressed This Visit          Neuro    Chemotherapy-induced neuropathy       Hematology    History of deep venous thrombosis (DVT) of distal vein of right lower  extremity       Oncology    Breast cancer, right - Primary    Liver metastases    Recurrent breast cancer, right       Endocrine    Adrenal nodule    Right thyroid nodule    Multinodular goiter       GI    Lesion of liver       Other    Axillary lymphadenopathy     #IDC right breast, practically triple negative:  To summarize:  -IDC right breast, high-grade, practically triple negative (ER 1%, low +; UT negative; HER2 2+, equivocal; HER2 negative by FISH)  -right breast mass initially noted 02/2020; she did not follow-up;   -biopsy after 2 years (01/07/2022)  -ER low + (1%).  UT negative (0%).  HER2 negative.  Ki-67 high proliferation (50%).  -3.1 cm mass on mammogram.  Grade 3. Right axillary lymph node biopsy +  -cT2 cN1 MX, grade 3, practically triple negative, AJCC anatomic stage IIB, clinical prognostic stage IIIB  -S/P neoadjuvant ddAC x4 (04/13/2022-06/02/2022), with some progression on restaging mammogram and ultrasound 06/17/2022  -S/P neoadjuvant dose dense Taxol x4 (08/31/2022-10/12/2022)  >> progression of right breast lesion but right axillary lymph node smaller on restaging bilateral diagnostic mammogram with contrast and limited ultrasound right breast 11/04/2022  >>>  Radiologic progression, with worsening liver metastasis:  -suspicion of small liver metastases on PET-CT 09/19/2022 and MRI abdomen 10/21/2022 (1.9 x 1.2 cm on PET-CT but 9 mm on MRI); per IR, too small for biopsy  -01/04/2023: Says that the lump in right breast is back  -disease progression on restaging CTs C/A/P 01/09/2023 (enlargement of right breast mass and right axillary lymph node; increase in size and number of liver metastasis, largest mass 7.3 x 7.1 cm, previously 2 cm)  -01/23/2023: No significant symptoms; ECOG 1  -01/23/2023:  CEA 5.10, elevated; CA 27.29 level 49.1, elevated; CA 15-3 level 22, normal  -no bone metastases on bone scan 01/31/2023  -CT-guided liver biopsy 02/02/2023: Metastatic carcinoma, consistent with  breast primary      # sites of disease:  Enlarging right breast mass post neoadjuvant ddAC and Taxol  Right axillary lymphadenopathy  Liver metastases, largest 7.3 x 7.1 cm on CT      # molecular markers:  -breast biopsy (01/07/2022):    ER low + (1%).  [place] negative (0%).  HER2 equivocal (2+).  HER2 negative.  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 Multinodular thyroid.  2. The dominant right upper thyroid pole nodule previously demonstrated hypermetabolic activity, warranting biopsy.  3. Dominant central left thyroid nodule meets biopsy criteria.  4. Additional nodules do not meet biopsy criteria secondary to insufficient size/sonographic characteristics; recommendations for ultrasound surveillance provided above.  -12/08/2022:  Ultrasound-guided biopsy, bilateral (FNA): Pathology:  1. Fine needle aspiration of left thyroid nodule: Unsatisfactory. Insufficient cells for diagnostic evaluation.   2. Fine needle aspiration of right thyroid nodule: Cytomorphology  of a benign thyroid nodule.        #History of right lower extremity DVT 02/19/2020:  -02/19/2020: (Right leg swelling for 3 days) acute nonocclusive DVT in the right distal femoral vein; acute occlusive DVTs right popliteal and right peroneal veins  -Apparently, at the same time, was also noted to have a breast lump  Which could only be biopsied on 01/07/2022, 2 years later, because of for noncompliance with follow-up (right breast, 3:00, a small lump, nontender, slightly irregular on palpation, not hard or fluctuant, no erythema) (apparently, [facility]  ambulatory clinics tried to contact her but without any response )        Plan:  -suspicion of small liver metastases on PET-CT 09/19/2022 and MRI abdomen 10/21/2022 (1.9 x 1.2 cm on PET-CT but 9 mm on MRI); per IR, too small for biopsy  -01/04/2023: Says that the lump in right breast is back  -disease progression on restaging CTs C/A/P 01/09/2023 (enlargement of right breast mass and right axillary lymph node; increase in size and number of liver metastasis, largest mass 7.3 x 7.1 cm, previously 2 cm)  -01/23/2023: No significant symptoms; ECOG 1  -01/23/2023:  CEA 5.10, elevated; CA 27.29 level 49.1, elevated; CA 15-3 level 22, normal  -no bone metastases on bone scan 01/31/2023  -CT-guided liver biopsy 02/02/2023: Metastatic carcinoma, consistent with breast primary  >>>  Metastatic disease, developing after neoadjuvant ddAC followed by neoadjuvant Taxol (completed 10/12/2022)   In this situation, no indication of surgical resection of breast mass  Sites of disease:  Enlarging right breast mass; right axillary lymphadenopathy; increasing # and size of liver metastases, largest 7.3 x 7.1 cm  >>>  Check liver biopsy for ER, ME, HER2 status  Comprehensive germline and somatic profiling  Genetic testing  NGS testing  Test liver biopsy for PIK3CA mutation, ESR 1 mutation, NTRK gene fusion, MSI, MMR, tumor mutational burden, and RET fusion, somatic BRCA1/2 mutations  Brain MRI with and without contrast to rule out brain metastasis  Visceral disease; will avoid endocrine therapy   Will avoid Adriamycin, cyclophosphamide, and Taxol (patient progressed on neoadjuvant ddAC and neoadjuvant Taxol)  Germline BRCA1/2 mutation testing is pending; therefore, we will proceed with systemic chemotherapy  Will treat with eribulin  During chemotherapy, check CBC and CMP prior to each dose   Check tumor markers (CEA, CA 15-3, CA 27.29 level) every month  Re-stage with contrast enhanced CT scans of C/A/P in a week before starting  chemotherapy, to establish a baseline   Re-stage with contrast enhanced CT scans of C/A/P a couple of months after starting eribulin    -02/09/2023: Pain in right breast and in the back; pain does not radiate down lower extremities and is not associated with weakness or numbness or tingling; retains full control of bowel and bladder; not much relief with ibuprofen; start Norco 5 mg every 4 hours p.r.n. for pain    Eribulin:  Schedule:  Days 1 and 8: Eribulin 1.4 mg/m² IV push  Repeat cycle every 3 weeks  IV infusion over 2-5 minutes    Warnings/precautions with eribulin:  1.  Bone marrow suppression.  Severe neutropenia and neutropenic fever.  2.  Peripheral neuropathy  3.  QT prolongation    Adverse reactions with eribulin:  >10%  Peripheral edema, fatigue, peripheral neuropathy, headache, alopecia, hypokalemia, hypocalcemia, nausea, constipation, abdominal pain, neutropenia, anemia, increased ALT, increased AST, etc.    Follow-up with ENT for thyroid nodules    Follow-up with NP within a week for chemotherapy teaching   Follow-up visit with me in 3 weeks.     Above discussed at length with the patient.  All questions answered.   Discussed labs, scans, and pathology report, and gave her copies of relevant reports.    Explained that she has stage IV disease which is incurable but we can attempt palliation with systemic chemotherapy; response can not be guaranteed and long-term prognosis is guarded.    Discussed potential side effects of chemotherapy and gave her educational materials from [employer].  She understands and agrees with this plan.    Follow-up:  No follow-ups on file.

## 2023-02-14 ENCOUNTER — OFFICE VISIT (OUTPATIENT)
Dept: HEMATOLOGY/ONCOLOGY | Facility: CLINIC | Age: 68
End: 2023-02-14
Payer: MEDICAID

## 2023-02-14 VITALS
RESPIRATION RATE: 20 BRPM | WEIGHT: 127.38 LBS | DIASTOLIC BLOOD PRESSURE: 69 MMHG | HEIGHT: 63 IN | BODY MASS INDEX: 22.57 KG/M2 | HEART RATE: 83 BPM | TEMPERATURE: 98 F | SYSTOLIC BLOOD PRESSURE: 117 MMHG | OXYGEN SATURATION: 97 %

## 2023-02-14 DIAGNOSIS — Z51.81 THERAPEUTIC DRUG MONITORING: ICD-10-CM

## 2023-02-14 DIAGNOSIS — Z71.89 ENCOUNTER FOR MEDICATION REVIEW AND COUNSELING: ICD-10-CM

## 2023-02-14 DIAGNOSIS — C50.911 MALIGNANT NEOPLASM OF RIGHT FEMALE BREAST, UNSPECIFIED ESTROGEN RECEPTOR STATUS, UNSPECIFIED SITE OF BREAST: Primary | ICD-10-CM

## 2023-02-14 PROCEDURE — 3008F PR BODY MASS INDEX (BMI) DOCUMENTED: ICD-10-PCS | Mod: CPTII,,,

## 2023-02-14 PROCEDURE — 1159F PR MEDICATION LIST DOCUMENTED IN MEDICAL RECORD: ICD-10-PCS | Mod: CPTII,,,

## 2023-02-14 PROCEDURE — 1101F PR PT FALLS ASSESS DOC 0-1 FALLS W/OUT INJ PAST YR: ICD-10-PCS | Mod: CPTII,,,

## 2023-02-14 PROCEDURE — 1125F PR PAIN SEVERITY QUANTIFIED, PAIN PRESENT: ICD-10-PCS | Mod: CPTII,,,

## 2023-02-14 PROCEDURE — 1159F MED LIST DOCD IN RCRD: CPT | Mod: CPTII,,,

## 2023-02-14 PROCEDURE — 3078F PR MOST RECENT DIASTOLIC BLOOD PRESSURE < 80 MM HG: ICD-10-PCS | Mod: CPTII,,,

## 2023-02-14 PROCEDURE — 1101F PT FALLS ASSESS-DOCD LE1/YR: CPT | Mod: CPTII,,,

## 2023-02-14 PROCEDURE — 99214 OFFICE O/P EST MOD 30 MIN: CPT | Mod: S$PBB,,,

## 2023-02-14 PROCEDURE — 3008F BODY MASS INDEX DOCD: CPT | Mod: CPTII,,,

## 2023-02-14 PROCEDURE — 1160F RVW MEDS BY RX/DR IN RCRD: CPT | Mod: CPTII,,,

## 2023-02-14 PROCEDURE — 3288F PR FALLS RISK ASSESSMENT DOCUMENTED: ICD-10-PCS | Mod: CPTII,,,

## 2023-02-14 PROCEDURE — 99214 PR OFFICE/OUTPT VISIT, EST, LEVL IV, 30-39 MIN: ICD-10-PCS | Mod: S$PBB,,,

## 2023-02-14 PROCEDURE — 3288F FALL RISK ASSESSMENT DOCD: CPT | Mod: CPTII,,,

## 2023-02-14 PROCEDURE — 1160F PR REVIEW ALL MEDS BY PRESCRIBER/CLIN PHARMACIST DOCUMENTED: ICD-10-PCS | Mod: CPTII,,,

## 2023-02-14 PROCEDURE — 3074F SYST BP LT 130 MM HG: CPT | Mod: CPTII,,,

## 2023-02-14 PROCEDURE — 3074F PR MOST RECENT SYSTOLIC BLOOD PRESSURE < 130 MM HG: ICD-10-PCS | Mod: CPTII,,,

## 2023-02-14 PROCEDURE — 3078F DIAST BP <80 MM HG: CPT | Mod: CPTII,,,

## 2023-02-14 PROCEDURE — 1125F AMNT PAIN NOTED PAIN PRSNT: CPT | Mod: CPTII,,,

## 2023-02-14 PROCEDURE — 99213 OFFICE O/P EST LOW 20 MIN: CPT | Mod: PBBFAC

## 2023-02-14 NOTE — PROGRESS NOTES
Past Medical History:   Diagnosis Date    Breast cancer     Liver metastases    Past medical history: Obesity.  Tobacco abuse.   -2020: Acute nonocclusive DVT in the right distal femoral vein; acute occlusive DVTs right popliteal and right peroneal veins ((she never followed up on that)  Procedure/surgical history: Cholecystectomy ().  Bilateral tubal ligation (according to her).  Social history: .  Lives in [place], [place].  Has 2 children.  Children.  Does not work.  Smoked half a pack of cigarettes daily for 40 years; quit 2 weeks ago.  No alcohol or illicit drugs.  Family history: No family history of cancer.  Health maintenance: According to her, screening colonoscopy 5 years ago at [facility], probably revealing a benign polyp.  Menstrual and OB/GYN history: Menarche, age 13.  Menopause, age 59.  G2, P2.  No abortions or miscarriages.  Age at delivery of first child, 22.  Did not breast-feed her children.  Used birth control pills for 1 year, subsequently, bilateral tubal ligation.  No history of hormone replacement therapy.  Past Surgical History:   Procedure Laterality Date    BREAST BIOPSY      CHOLECYSTECTOMY        Social History     Socioeconomic History    Marital status:    Tobacco Use    Smoking status: Former     Packs/day: 0.25     Years: 40.00     Pack years: 10.00     Types: Cigarettes     Quit date: 3/5/2022     Years since quittin.9    Smokeless tobacco: Never   Substance and Sexual Activity    Alcohol use: Not Currently    Drug use: Never    Sexual activity: Yes      Family History   Problem Relation Age of Onset    Cancer Neg Hx         Reason for Follow-up:  -IDC right breast, axillary lymph node biopsy +, presumed triple negative, AJCC stage IIB, clinical prognostic stage IIIB  -liver lesions; adrenal lesions  -History of right lower extremity DVT    -right thyroid lesion       History of Present Illness:   Breast Cancer (Malignant neoplasm of right female breast,  unspecified estrogen receptor status, unspecified site of breast )        Oncologic/Hematologic History:  Oncology History   Breast cancer, right   1/7/2022 Cancer Staged    Staging form: Breast, AJCC 8th Edition  - Clinical stage from 1/7/2022: Stage IIIA (cT2, cN1, cM0, G3, ER+, AR-, HER2-)       4/7/2022 Initial Diagnosis    Breast cancer     4/13/2022 - 6/3/2022 Chemotherapy    Treatment Summary   Plan Name: OP BREAST DOSE-DENSE AC - DOXORUBICIN CYCLOPHOSPHAMIDE Q2W  Treatment Goal: Control  Status: Inactive  Start Date: 4/13/2022  End Date: 6/3/2022  Provider: Gurmeet Hatfield MD  Chemotherapy: DOXOrubicin chemo injection 112 mg, 60 mg/m2 = 112 mg, Intravenous, Clinic/[facility] 1 time, 4 of 4 cycles  Administration: 112 mg (5/5/2022), 112 mg (5/19/2022), 112 mg (6/2/2022)  cyclophosphamide 600 mg/m2 = 1,120 mg in sodium chloride 0.9% 250 mL chemo infusion, 600 mg/m2 = 1,120 mg, Intravenous, Clinic/[facility] 1 time, 4 of 4 cycles  Administration: 1,100 mg (5/5/2022), 1,120 mg (5/19/2022), 1,100 mg (6/2/2022)       8/31/2022 - 10/13/2022 Chemotherapy    Treatment Summary   Plan Name: OP BREAST PACLITAXEL Q2W  Treatment Goal: Control  Status: Inactive  Start Date: 8/31/2022  End Date: 10/13/2022  Provider: Gurmeet Hatfield MD  Chemotherapy: PACLitaxeL (TAXOL) 175 mg/m2 = 312 mg in sodium chloride 0.9% 500 mL chemo infusion, 175 mg/m2 = 312 mg, Intravenous, Clinic/[facility] 1 time, 4 of 4 cycles  Administration: 312 mg (8/31/2022), 312 mg (9/14/2022), 312 mg (9/28/2022), 312 mg (10/12/2022)       2/13/2023 -  Chemotherapy    Treatment Summary   Plan Name: OP ERIBULIN Q3W  Treatment Goal: Palliative  Status: Active  Start Date: 2/13/2023 (Planned)  End Date: 10/9/2023 (Planned)  Provider: Gurmeet Hatfield MD  Chemotherapy: eriBULin (HALAVEN) 2.25 mg in sodium chloride 0.9% 104.5 mL IVPB, 1.4 mg/m2 = 2.25 mg, Intravenous, Clinic/[facility] 1 time, 0 of 12 cycles       Liver metastasis   2/8/2023 Initial Diagnosis    Liver metastasis      2/13/2023 -  Chemotherapy    Treatment Summary   Plan Name: OP ERIBULIN Q3W  Treatment Goal: Palliative  Status: Active  Start Date: 2/13/2023 (Planned)  End Date: 10/9/2023 (Planned)  Provider: Gurmeet Hatfield MD  Chemotherapy: eriBULin (HALAVEN) 2.25 mg in sodium chloride 0.9% 104.5 mL IVPB, 1.4 mg/m2 = 2.25 mg, Intravenous, Clinic/[facility] 1 time, 0 of 12 cycles        66-year-old lady referred from surgery, with breast cancer.     She initially presented to emergency department 11/15/2021 for evaluation of breast pain and a palpable lump in the right breast.  Subsequent imaging studies and biopsy established diagnosis of invasive ductal carcinoma of right breast, axillary lymph node positive, ER low positive, NV negative, and HER-2 negative.    Oncologic history:   #IDC right breast, presumed triple negative:   -History of right lower extremity DVT and right breast mass 02/2020; she did not follow-up   -Presentation: 11/2021: Palpable mass   -01/07/2022: Bilateral diagnostic mammogram with contrast and limited ultrasound right breast   -01/07/2022: Biopsy   -3.1 cm mass on mammogram, overall grade 3, right axillary lymph node biopsy positive   -ER low positive (1%).  NV negative (0%).  HER2 equivocal (Score 2+).  HER-2 negative by FISH.  Ki-67 high proliferation (50%)   >>>   For the purpose of neoadjuvant chemotherapy, since ER is low positive, we will treat the patient as if triple negative   >>>  -cT2 cN1 MX, grade 3, presumed triple negative, AJCC anatomic stage IIB, clinical prognostic stage IIIB   -02/22/2022: DEXA scan: Normal BMD   -02/22/2022: CT C/A/P with contrast for staging: Right breast mass with right axillary lymphadenopathy; bilateral adrenal nodules, statistically representing adenomas   -02/22/2022: Whole-body nuclear medicine bone scan: No bone metastasis   -03/02/2022: Mediport placed   -03/11/2022: TTE: LVEF 55-60%  -neoadjuvant DD AC started 04/13/2022; severe neutropenia, ANC 0.03, on  04/26/2022; therefore, cycle 2 held; treated with growth factor and prophylactic ciprofloxacin  -cycle 2 on 05/05/2022; cycle 3 on 05/19/2022; cycle 4 on 06/02/2022   -some progression on restaging mammogram and ultrasound 06/17/2022  -06/21/2022:  She was supposed to start neoadjuvant dose dense Taxol but was sent to emergency department for evaluation because she was feeling extremely weak, sick, unable to walk for 2-3 weeks  -06/21/2022:  WBC, differential unremarkable.  Hemoglobin 9.2, stable.  Platelets 210 K. ANC 5.6.  CMP stable and within acceptable limits.  Magnesium normal.  BNP normal.  TSH 2.2180, normal.    -06/21/2022:  V/Q scan:  Normal/very low probability of pulmonary embolism  -07/15/2022:  CT C/A/P with contrast (comparison:  02/22/2022):   Mass in the right breast with associated skin thickening in the right breast overall slightly less prominent than the prior examination.  The right axillary lymphadenopathy is also less prominent than the prior examination   Interval development of multiple punctate hypoattenuating areas within the liver concerning for possible developing metastatic foci.  Short-term follow-up is recommended.  These nodules are too small to characterize and are all subcentimeter in size.   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  CA  27.29 level normal.  CA 15-3 level normal.  -neoadjuvant dose dense Taxol started 08/31/2022  -neoadjuvant dose dense Taxol: Cycle 1 (08/31/2022); cycle 2 (09/14/2022)  -09/09/2022: Whole-body nuclear medicine bone scan (comparison:  Bone scan 02/22/2022; CT C/A/P 0 07/15/2022): No bone metastasis  -09/19/2022: PET-CT to rule out metastatic disease (comparison:  09/09/2022 bone scan; 07/15/2022 CT C/A/P):  1. Slight interval enlargement of hypermetabolic right breast mass and associated right axillary node.  2. Metastatic right hepatic lesion (central aspect of right liver, 1.9 x 1.2 cm), with no other definite FDG-avid or aggressive appearing process through the neck, chest, abdomen, or pelvis.  3. Subcentimeter [place] foci are less conspicuous and again of limited characterization due to size and non-contrast protocol, but statistically favored to represent benign cysts.  Close attention on surveillance imaging is needed in order to ensure ongoing stability.  4. Incidental right adrenal adenoma (1.7 x 4.3 cm)  5. Diffuse osseous uptake is favored to reflect sequela of systemic therapeutic agent.  6. Hypermetabolic low-density nodule posterior right hemithyroid, 1.6 x 1.4 cm, maximum SUV 4.6, every -neoadjuvant dose dense Taxol:  Cycle 3 (09/28/2022); cycle 4 (10/12/2022)  -10/17/2022:  Thyroid ultrasound:  1. Multinodular thyroid.  2. The dominant right upper thyroid pole nodule previously demonstrated hypermetabolic activity, warranting biopsy.  3. Dominant central left thyroid nodule meets biopsy criteria.  4. Additional nodules do not meet biopsy criteria secondary to insufficient size/sonographic characteristics; recommendations for ultrasound surveillance provided above.  -10/21/2022:  MRI abdomen with and without contrast (comparison:  09/19/2022):  1. There are 2 small right hepatic lobe lesions suspicious for metastases.  One of these was hypermetabolic on recent PET-CT.  Right lobe lesion 9 mm,  corresponding to hypermetabolic lesion on PET-CT.  More inferiorly in right hepatic lobe 6 mm, this was not evident on PET  2. Bilateral adrenal adenomas show no significant change going back to February 2022.  -11/04/2022: IR:  Liver lesion too small for biopsy  -11/04/2022:  Restaging bilateral diagnostic mammogram with contrast and limited ultrasound right breast (comparison:  06/17/2022):  Right breast mass 3.8 x 2.8 x 3.4 cm, previously 3.2 x 3.1 x 3.6 cm (06/17/2022) and 3 x 3.1 x 2.1 cm) 12/21/2021); right axillary lymph node 2.6 x 1 x 1.4 cm, previously 3.6 x 1.3 x 1.3 cm (06/17/2022) and 3.5 x 0.9 x 1.4 cm (12/21/2021) (right breast malignancy with nipple and skin involvement; compared to 06/17/2022, there has been interval development of at least 2 adjacent satellite lesions upper outer right breast and interval progression in the associated calcifications; total satellite lesions measure 3.6 x 5.0 x 5.3 cm, previously 3.8 x 3.5 x 3 cm on 06/17/2022 and 3.5 by 2.6 x 2.8 cm on 01/07/2022)  -12/08/2022:  Ultrasound-guided biopsy, bilateral (FNA): Pathology:  1. Fine needle aspiration of left thyroid nodule: Unsatisfactory. Insufficient cells for diagnostic evaluation.   2. Fine needle aspiration of right thyroid nodule: Cytomorphology  of a benign thyroid nodule.  -01/09/2023: Restaging CTs C/A/P with contrast (comparison:  PET-CT 09/19/2022, MRI abdomen 09/21/2022):   1. Interval progression of disease  2. Slight interval increase in size of right breast mass (4.6 x 3 cm, previously 4 x 3 cm)  3. Interval increase in size of right axillary lymph node (1.9 cm, previously 1.1 cm)  4. Interval increase in size and number of hepatic metastases (largest mass with satellite nodularity right lobe 7.3 x 7.1 cm, previously 2 cm)  -01/30/2023:  Ultrasound soft tissues of the neck pelvic comparison:  10/17/2022):   The hypermetabolic right thyroid lobe upper pole dominant 1.8 cm diameter isoechoic nodule appear  slightly enlarged from prior 1.5 cm.   Grossly stable 1.6 cm diameter cystic and solid left thyroid lobe nodule with partially obscured borders, defer to prior FNA results.  -01/31/2023: Restaging whole-body nuclear medicine bone scan pelvic comparison: CT C/A/P 0 01/09/2023):  No bone metastasis  -02/02/2023:  CT-guided right liver biopsy: Pathology:  Metastatic carcinoma, consistent with a breast primary  -01/23/2023:  CEA 5.10, elevated; CA 27.29 level 49.1, elevated; CA 15-3 level 22, normal        02/10/2022:  Presents for initial medical oncology consultation, accompanied by her .     Interval History:  PORT FLUSH   OP ERIBULIN Q3W   -01/30/2023:  Ultrasound soft tissues of the neck pelvic comparison:  10/17/2022):   The hypermetabolic right thyroid lobe upper pole dominant 1.8 cm diameter isoechoic nodule appear slightly enlarged from prior 1.5 cm.   Grossly stable 1.6 cm diameter cystic and solid left thyroid lobe nodule with partially obscured borders, defer to prior FNA results.  -01/31/2023: Restaging whole-body nuclear medicine bone scan pelvic comparison: CT C/A/P 0 01/09/2023):  No bone metastasis  -02/02/2023:  CT-guided right liver biopsy: Pathology:  Metastatic carcinoma, consistent with a breast primary  -01/23/2023:  CEA 5.10, elevated; CA 27.29 level 49.1, elevated; CA 15-3 level 22, normal  -2/16/2023  starting Eribulin cycle 1 day 1  Presents for a follow-up visit.  In no acute discomfort.  Reports some pain in the right breast and in the right side of back, 5/10 severity.  Has been taking ibuprofen, without much relief.  We will start her on Norco 5 mg every 4 hours PRN.  Appetite is so-so but she does not want to try any appetite stimulant.  No unusual headaches, focal neurological symptoms, chest pain, cough, dyspnea, abdominal pain, nausea, vomiting, jaundice, fevers, or chills.  ECOG 1.  Some numbness and tingling in hands and feet but not severe; does not interfere with daily  activities.    2/14/2023:   Patient presents alone today for Eribulin chemotherapy education.  Patient with complaint of pain in breast and right side.  She reports taking ibuprofen without much relief.  Norco prescribed but patient states too expensive. Offered to call hospital pharmacy to compare pricing and she states she would rather not for now.  Advised if unable to tolerate pain and would like to compare costs at hospital pharmacy to inform clinic. She is amenable.  No unusual headaches, focal neurological symptoms, chest pain, cough, dyspnea, abdominal pain, nausea, vomiting, jaundice, fevers, or chills.  Some numbness and tingling in hands and feet but not severe; does not interfere with daily activities. The following completed and explained in its entirety to patient about chemotherapy.    Warnings/precautions with eribulin:  1.  Bone marrow suppression.  Severe neutropenia and neutropenic fever.  2.  Peripheral neuropathy  3.  QT prolongation    Adverse reactions with eribulin:  >10%  Peripheral edema, fatigue, peripheral neuropathy, headache, alopecia, hypokalemia, hypocalcemia, nausea, constipation, abdominal pain, neutropenia, anemia, increased ALT, increased AST, etc.    She verbalized understanding. Consents signed        Medications:  Current Outpatient Medications on File Prior to Visit   Medication Sig Dispense Refill    dexAMETHasone (DECADRON) 4 MG Tab Take 5 tablets (20 mg total) by mouth As instructed (for chemotherapy). Take 5 tablets (20 mg total) po 12 hours before treatment and then repeat 6 hours before chemotherapy treatment (Patient not taking: Reported on 11/29/2022) 30 tablet 0    HYDROcodone-acetaminophen 5-300 mg Tab Take 1 tablet by mouth every 4 (four) hours as needed (Pain). 30 each 0    ondansetron (ZOFRAN) 8 MG tablet Take 1 tablet (8 mg total) by mouth every 8 (eight) hours as needed for Nausea. (Patient not taking: Reported on 11/29/2022) 30 tablet 2     No current  facility-administered medications on file prior to visit.       Review of Systems:   All systems reviewed and found to be negative except for the symptoms detailed above    Physical Examination:   VITAL SIGNS:   Vitals:    02/14/23 1319   BP: 117/69   Pulse: 83   Resp: 20   Temp: 97.6 °F (36.4 °C)       GENERAL:  In no apparent distress.    HEAD:  No signs of head trauma.  EYES:  Pupils are equal.  Extraocular motions intact.    EARS:  Hearing grossly intact.  MOUTH:  Oropharynx is normal.   NECK:  No adenopathy, no JVD.     CHEST:  Chest with clear breath sounds bilaterally.  No wheezes, rales, rhonchi.    CARDIAC:  Regular rate and rhythm.  S1 and S2, without murmurs, gallops, rubs.  VASCULAR:  No Edema.  Peripheral pulses normal and equal in all extremities.  ABDOMEN:  Soft, without detectable tenderness.  No sign of distention.  No   rebound or guarding, and no masses palpated.   Bowel Sounds normal.  MUSCULOSKELETAL:  Good range of motion of all major joints. Extremities without clubbing, cyanosis or edema.    NEUROLOGIC EXAM:  Alert and oriented x 3.  No focal sensory or strength deficits.   Speech normal.  Follows commands.  PSYCHIATRIC:  Mood normal.        Assessment:  Problem List Items Addressed This Visit          Oncology    Breast cancer, right - Primary    Relevant Orders    CBC Auto Differential    Comprehensive Metabolic Panel     Other Visit Diagnoses       Encounter for medication review and counseling        Therapeutic drug monitoring        Relevant Orders    EKG 12-lead          #IDC right breast, practically triple negative:  To summarize:  -IDC right breast, high-grade, practically triple negative (ER 1%, low +; MS negative; HER2 2+, equivocal; HER2 negative by FISH)  -right breast mass initially noted 02/2020; she did not follow-up;   -biopsy after 2 years (01/07/2022)  -ER low + (1%).  MS negative (0%).  HER2 negative.  Ki-67 high proliferation (50%).  -3.1 cm mass on mammogram.  Grade 3.  Right axillary lymph node biopsy +  -cT2 cN1 MX, grade 3, practically triple negative, AJCC anatomic stage IIB, clinical prognostic stage IIIB  -S/P neoadjuvant ddAC x4 (04/13/2022-06/02/2022), with some progression on restaging mammogram and ultrasound 06/17/2022  -S/P neoadjuvant dose dense Taxol x4 (08/31/2022-10/12/2022)  >> progression of right breast lesion but right axillary lymph node smaller on restaging bilateral diagnostic mammogram with contrast and limited ultrasound right breast 11/04/2022  >>>  Radiologic progression, with worsening liver metastasis:  -suspicion of small liver metastases on PET-CT 09/19/2022 and MRI abdomen 10/21/2022 (1.9 x 1.2 cm on PET-CT but 9 mm on MRI); per IR, too small for biopsy  -01/04/2023: Says that the lump in right breast is back  -disease progression on restaging CTs C/A/P 01/09/2023 (enlargement of right breast mass and right axillary lymph node; increase in size and number of liver metastasis, largest mass 7.3 x 7.1 cm, previously 2 cm)  -01/23/2023: No significant symptoms; ECOG 1  -01/23/2023:  CEA 5.10, elevated; CA 27.29 level 49.1, elevated; CA 15-3 level 22, normal  -no bone metastases on bone scan 01/31/2023  -CT-guided liver biopsy 02/02/2023: Metastatic carcinoma, consistent with breast primary      # sites of disease:  Enlarging right breast mass post neoadjuvant ddAC and Taxol  Right axillary lymphadenopathy  Liver metastases, largest 7.3 x 7.1 cm on CT      # molecular markers:  -breast biopsy (01/07/2022):    ER low + (1%).  ID negative (0%).  HER2 equivocal (2+).  HER2 negative.  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 Multinodular thyroid.  2. The dominant right upper thyroid pole nodule previously demonstrated hypermetabolic activity, warranting biopsy.  3. Dominant central left thyroid nodule meets biopsy criteria.  4. Additional nodules do not meet biopsy criteria secondary to insufficient size/sonographic characteristics; recommendations for ultrasound surveillance provided above.  -12/08/2022:  Ultrasound-guided biopsy, bilateral (FNA): Pathology:  1. Fine needle aspiration of left thyroid nodule: Unsatisfactory. Insufficient cells for diagnostic evaluation.   2. Fine needle aspiration of right thyroid nodule: Cytomorphology  of a benign thyroid nodule.        #History of right lower extremity DVT 02/19/2020:  -02/19/2020: (Right leg swelling for 3 days) acute nonocclusive DVT in the right distal femoral vein; acute occlusive DVTs right popliteal and right peroneal veins  -Apparently, at the same time, was also noted to have a breast lump  Which could only be biopsied on 01/07/2022, 2 years later, because of for noncompliance with follow-up (right breast, 3:00, a small lump, nontender, slightly irregular on palpation, not hard or fluctuant, no erythema) (apparently, [facility] ambulatory clinics tried to contact her but without any response )        Plan:  -suspicion of small liver metastases on PET-CT 09/19/2022 and MRI abdomen 10/21/2022 (1.9 x 1.2 cm on PET-CT but 9 mm on MRI); per IR, too small for biopsy  -01/04/2023: Says that the lump in right breast is back  -disease progression on restaging CTs C/A/P 01/09/2023 (enlargement of right breast mass and right axillary lymph node; increase in size and number of liver metastasis, largest mass 7.3 x 7.1 cm, previously 2 cm)  -01/23/2023: No significant symptoms; ECOG 1  -01/23/2023:  CEA 5.10, elevated; CA 27.29 level 49.1, elevated; CA 15-3 level 22, normal  -no bone metastases on bone scan  01/31/2023  -CT-guided liver biopsy 02/02/2023: Metastatic carcinoma, consistent with breast primary  >>>  Metastatic disease, developing after neoadjuvant ddAC followed by neoadjuvant Taxol (completed 10/12/2022)   In this situation, no indication of surgical resection of breast mass  Sites of disease:  Enlarging right breast mass; right axillary lymphadenopathy; increasing # and size of liver metastases, largest 7.3 x 7.1 cm  >>>  Check liver biopsy for ER, TN, HER2 status  Comprehensive germline and somatic profiling  Genetic testing  NGS testing  Test liver biopsy for PIK3CA mutation, ESR 1 mutation, NTRK gene fusion, MSI, MMR, tumor mutational burden, and RET fusion, somatic BRCA1/2 mutations  Visceral disease; will avoid endocrine therapy   Will avoid Adriamycin, cyclophosphamide, and Taxol (patient progressed on neoadjuvant ddAC and neoadjuvant Taxol)  Germline BRCA1/2 mutation testing is pending; therefore, we will proceed with systemic chemotherapy        -Check tumor markers (CEA, CA 15-3, CA 27.29 level) every month    -EKG for baseline before starting chemotherapy    -Brain MRI with and without contrast to rule out brain metastasis 2/28/2023    -Re-stage with contrast enhanced CT scans of C/A/P in a week before starting chemotherapy, to establish a baseline 2/17/2023    -Re-stage with contrast enhanced CT scans of C/A/P a couple of months after starting eribulin    -02/14/2023: Pain in right breast and in the back; pain does not radiate down lower extremities and is not associated with weakness or numbness or tingling; retains full control of bowel and bladder; not much relief with ibuprofen; start Norco 5 mg every 4 hours p.r.n. for pain; however patient did not  due to costs. Offered to call hospital pharmacy to compare costs and she would rather not for now    Eribulin:  Schedule:  Days 1 and 8: Eribulin 1.4 mg/m² IV push  Repeat cycle every 3 weeks  IV infusion over 2-5  minutes    Warnings/precautions with eribulin:  1.  Bone marrow suppression.  Severe neutropenia and neutropenic fever.  2.  Peripheral neuropathy  3.  QT prolongation    Adverse reactions with eribulin:  >10%  Peripheral edema, fatigue, peripheral neuropathy, headache, alopecia, hypokalemia, hypocalcemia, nausea, constipation, abdominal pain, neutropenia, anemia, increased ALT, increased AST, etc.    Follow-up with ENT for thyroid nodules    Follow up with labs and NP visit 1 week after cycle day1 of Eribulin  Follow-up visit with MD in 2 weeks.     Above discussed at length with the patient.  All questions answered.   Discussed labs, scans, and pathology report, and gave her copies of relevant reports.    Explained that she has stage IV disease which is incurable but we can attempt palliation with systemic chemotherapy; response can not be guaranteed and long-term prognosis is guarded.    Discussed potential side effects of chemotherapy and gave her educational materials from [employer].  She understands and agrees with this plan.

## 2023-02-17 ENCOUNTER — HOSPITAL ENCOUNTER (OUTPATIENT)
Dept: RADIOLOGY | Facility: HOSPITAL | Age: 68
Discharge: HOME | End: 2023-02-17
Attending: INTERNAL MEDICINE
Payer: MEDICAID

## 2023-02-17 DIAGNOSIS — C50.911 MALIGNANT NEOPLASM OF RIGHT FEMALE BREAST, UNSPECIFIED ESTROGEN RECEPTOR STATUS, UNSPECIFIED SITE OF BREAST: ICD-10-CM

## 2023-02-17 PROCEDURE — 71260 CT THORAX DX C+: CPT | Mod: TC

## 2023-02-17 PROCEDURE — 74177 CT ABD & PELVIS W/CONTRAST: CPT | Mod: TC

## 2023-02-17 PROCEDURE — 25500020 PHARM REV CODE 255

## 2023-02-17 RX ADMIN — IOHEXOL 100 ML: 350 INJECTION, SOLUTION INTRAVENOUS at 08:02

## 2023-02-20 DIAGNOSIS — C50.911 RECURRENT BREAST CANCER, RIGHT: ICD-10-CM

## 2023-02-20 DIAGNOSIS — C50.911 MALIGNANT NEOPLASM OF RIGHT FEMALE BREAST, UNSPECIFIED ESTROGEN RECEPTOR STATUS, UNSPECIFIED SITE OF BREAST: ICD-10-CM

## 2023-02-20 DIAGNOSIS — C78.7 LIVER METASTASES: ICD-10-CM

## 2023-02-20 RX ORDER — HYDROCODONE BITARTRATE AND ACETAMINOPHEN 5; 300 MG/1; MG/1
1 TABLET ORAL EVERY 4 HOURS PRN
Qty: 60 EACH | Refills: 0 | Status: SHIPPED | OUTPATIENT
Start: 2023-02-20 | End: 2023-02-23 | Stop reason: SDUPTHER

## 2023-02-23 ENCOUNTER — DOCUMENTATION ONLY (OUTPATIENT)
Dept: HEMATOLOGY/ONCOLOGY | Facility: CLINIC | Age: 68
End: 2023-02-23

## 2023-02-23 ENCOUNTER — INFUSION (OUTPATIENT)
Dept: INFUSION THERAPY | Facility: HOSPITAL | Age: 68
End: 2023-02-23
Attending: INTERNAL MEDICINE
Payer: MEDICAID

## 2023-02-23 ENCOUNTER — CLINICAL SUPPORT (OUTPATIENT)
Dept: HEMATOLOGY/ONCOLOGY | Facility: CLINIC | Age: 68
End: 2023-02-23
Payer: MEDICAID

## 2023-02-23 VITALS
TEMPERATURE: 98 F | RESPIRATION RATE: 20 BRPM | BODY MASS INDEX: 22.54 KG/M2 | OXYGEN SATURATION: 98 % | DIASTOLIC BLOOD PRESSURE: 57 MMHG | WEIGHT: 127.19 LBS | HEART RATE: 115 BPM | SYSTOLIC BLOOD PRESSURE: 107 MMHG

## 2023-02-23 DIAGNOSIS — C78.7 LIVER METASTASES: ICD-10-CM

## 2023-02-23 DIAGNOSIS — C50.911 MALIGNANT NEOPLASM OF RIGHT FEMALE BREAST, UNSPECIFIED ESTROGEN RECEPTOR STATUS, UNSPECIFIED SITE OF BREAST: ICD-10-CM

## 2023-02-23 DIAGNOSIS — C50.011 MALIGNANT NEOPLASM OF NIPPLE OF RIGHT BREAST IN FEMALE, UNSPECIFIED ESTROGEN RECEPTOR STATUS: ICD-10-CM

## 2023-02-23 DIAGNOSIS — R59.0 AXILLARY LYMPHADENOPATHY: ICD-10-CM

## 2023-02-23 DIAGNOSIS — C50.911 RECURRENT BREAST CANCER, RIGHT: ICD-10-CM

## 2023-02-23 DIAGNOSIS — C78.7 LIVER METASTASIS: Primary | ICD-10-CM

## 2023-02-23 LAB
ALBUMIN SERPL-MCNC: 2.9 G/DL (ref 3.4–4.8)
ALBUMIN/GLOB SERPL: 0.7 RATIO (ref 1.1–2)
ALP SERPL-CCNC: 427 UNIT/L (ref 40–150)
ALT SERPL-CCNC: 39 UNIT/L (ref 0–55)
AST SERPL-CCNC: 235 UNIT/L (ref 5–34)
BASOPHILS # BLD AUTO: 0.03 X10(3)/MCL (ref 0–0.2)
BASOPHILS NFR BLD AUTO: 0.3 %
BILIRUBIN DIRECT+TOT PNL SERPL-MCNC: 1.4 MG/DL
BUN SERPL-MCNC: 27.6 MG/DL (ref 9.8–20.1)
CALCIUM SERPL-MCNC: 10.7 MG/DL (ref 8.4–10.2)
CEA SERPL-MCNC: 25.49 NG/ML (ref 0–3)
CHLORIDE SERPL-SCNC: 101 MMOL/L (ref 98–107)
CO2 SERPL-SCNC: 25 MMOL/L (ref 23–31)
CREAT SERPL-MCNC: 1.68 MG/DL (ref 0.55–1.02)
EOSINOPHIL # BLD AUTO: 0.14 X10(3)/MCL (ref 0–0.9)
EOSINOPHIL NFR BLD AUTO: 1.6 %
ERYTHROCYTE [DISTWIDTH] IN BLOOD BY AUTOMATED COUNT: 16.2 % (ref 11.5–17)
GFR SERPLBLD CREATININE-BSD FMLA CKD-EPI: 34 MLS/MIN/1.73/M2
GLOBULIN SER-MCNC: 4 GM/DL (ref 2.4–3.5)
GLUCOSE SERPL-MCNC: 117 MG/DL (ref 82–115)
HCT VFR BLD AUTO: 35.3 % (ref 37–47)
HGB BLD-MCNC: 11.5 G/DL (ref 12–16)
IMM GRANULOCYTES # BLD AUTO: 0.07 X10(3)/MCL (ref 0–0.04)
IMM GRANULOCYTES NFR BLD AUTO: 0.8 %
LYMPHOCYTES # BLD AUTO: 0.83 X10(3)/MCL (ref 0.6–4.6)
LYMPHOCYTES NFR BLD AUTO: 9.4 %
MCH RBC QN AUTO: 33 PG
MCHC RBC AUTO-ENTMCNC: 32.6 G/DL (ref 33–36)
MCV RBC AUTO: 101.1 FL (ref 80–94)
MONOCYTES # BLD AUTO: 0.52 X10(3)/MCL (ref 0.1–1.3)
MONOCYTES NFR BLD AUTO: 5.9 %
NEUTROPHILS # BLD AUTO: 7.23 X10(3)/MCL (ref 2.1–9.2)
NEUTROPHILS NFR BLD AUTO: 82 %
NRBC BLD AUTO-RTO: 0 %
PLATELET # BLD AUTO: 218 X10(3)/MCL (ref 130–400)
PMV BLD AUTO: 10.1 FL (ref 7.4–10.4)
POTASSIUM SERPL-SCNC: 3.9 MMOL/L (ref 3.5–5.1)
PROT SERPL-MCNC: 6.9 GM/DL (ref 5.8–7.6)
RBC # BLD AUTO: 3.49 X10(6)/MCL (ref 4.2–5.4)
SODIUM SERPL-SCNC: 137 MMOL/L (ref 136–145)
WBC # SPEC AUTO: 8.8 X10(3)/MCL (ref 4.5–11.5)

## 2023-02-23 PROCEDURE — 25000003 PHARM REV CODE 250

## 2023-02-23 PROCEDURE — 85025 COMPLETE CBC W/AUTO DIFF WBC: CPT

## 2023-02-23 PROCEDURE — 82378 CARCINOEMBRYONIC ANTIGEN: CPT

## 2023-02-23 PROCEDURE — 63600175 PHARM REV CODE 636 W HCPCS: Performed by: INTERNAL MEDICINE

## 2023-02-23 PROCEDURE — 80053 COMPREHEN METABOLIC PANEL: CPT

## 2023-02-23 PROCEDURE — 36415 COLL VENOUS BLD VENIPUNCTURE: CPT

## 2023-02-23 PROCEDURE — 96375 TX/PRO/DX INJ NEW DRUG ADDON: CPT

## 2023-02-23 PROCEDURE — 86300 IMMUNOASSAY TUMOR CA 15-3: CPT

## 2023-02-23 PROCEDURE — 96365 THER/PROPH/DIAG IV INF INIT: CPT

## 2023-02-23 PROCEDURE — 25000003 PHARM REV CODE 250: Performed by: INTERNAL MEDICINE

## 2023-02-23 PROCEDURE — 96409 CHEMO IV PUSH SNGL DRUG: CPT

## 2023-02-23 PROCEDURE — 96368 THER/DIAG CONCURRENT INF: CPT

## 2023-02-23 PROCEDURE — 99211 OFF/OP EST MAY X REQ PHY/QHP: CPT | Mod: PBBFAC,25

## 2023-02-23 PROCEDURE — 63600175 PHARM REV CODE 636 W HCPCS: Mod: JG

## 2023-02-23 RX ORDER — HYDROCODONE BITARTRATE AND ACETAMINOPHEN 5; 300 MG/1; MG/1
1 TABLET ORAL EVERY 4 HOURS PRN
Qty: 60 EACH | Refills: 0 | Status: SHIPPED | OUTPATIENT
Start: 2023-02-23 | End: 2023-02-23 | Stop reason: CLARIF

## 2023-02-23 RX ORDER — HYDROCODONE BITARTRATE AND ACETAMINOPHEN 5; 325 MG/1; MG/1
1 TABLET ORAL EVERY 4 HOURS PRN
Qty: 60 TABLET | Refills: 0 | Status: SHIPPED | OUTPATIENT
Start: 2023-02-23

## 2023-02-23 RX ORDER — SODIUM CHLORIDE 0.9 % (FLUSH) 0.9 %
10 SYRINGE (ML) INJECTION
Status: DISCONTINUED | OUTPATIENT
Start: 2023-02-23 | End: 2023-02-23 | Stop reason: HOSPADM

## 2023-02-23 RX ORDER — HEPARIN 100 UNIT/ML
500 SYRINGE INTRAVENOUS
Status: DISCONTINUED | OUTPATIENT
Start: 2023-02-23 | End: 2023-02-23 | Stop reason: HOSPADM

## 2023-02-23 RX ORDER — ONDANSETRON 2 MG/ML
8 INJECTION INTRAMUSCULAR; INTRAVENOUS
Status: COMPLETED | OUTPATIENT
Start: 2023-02-23 | End: 2023-02-23

## 2023-02-23 RX ADMIN — ERIBULIN MESYLATE 1.75 MG: 0.5 INJECTION INTRAVENOUS at 12:02

## 2023-02-23 RX ADMIN — HEPARIN 500 UNITS: 100 SYRINGE at 12:02

## 2023-02-23 RX ADMIN — ONDANSETRON 8 MG: 2 INJECTION INTRAMUSCULAR; INTRAVENOUS at 11:02

## 2023-02-23 RX ADMIN — SODIUM CHLORIDE: 9 INJECTION, SOLUTION INTRAVENOUS at 11:02

## 2023-02-23 RX ADMIN — SODIUM CHLORIDE 1000 ML: 9 INJECTION, SOLUTION INTRAVENOUS at 11:02

## 2023-02-23 NOTE — NURSING
0947 Patient is here for C1 Erbulin. Patient requested pain med rx to fill here in outpatient pharmacy due to unable to afford cost at [facility]. Message send to Elizabeth Patel NP.   11:05 labs reviewed w/Elizabeth Patel NP; cr cl 27.6 (below parameters) Per Elizabeth dose adjustment was made. 1 L NS added to today's infusion due to BUN 27.6.

## 2023-02-23 NOTE — PROGRESS NOTES
Creatinine clearance at 27.6 today. Patient scheduled to start Cycle 1 of Halaven today. Halaven currently dosed at 1.4m^2. Will reduce to 1.1m^2 per up to date recommendations and proceed with treatment.  Will also administer 1 liter of NS and recheck in labs in 1 week and patient scheduled to see Dr Alysia MD in 1 week as well. Will refer to Nephrology for further workup as she have had consistent elevated serum creatinine levels.

## 2023-02-23 NOTE — PROGRESS NOTES
Pain medication sent to [facility] in [place] and patient reports unable to afford and requesting pain medication is sent to [facility] pharmacy. Called [facility] in [place] to confirm patient did not receive and discontinued.  Will send to [facility] pharmacy today for patient to . Patient is aware and verbalized understanding.

## 2023-02-24 LAB
CANCER AG15-3 SERPL IA-ACNC: 81 U/ML
CANCER AG27-29 SERPL-ACNC: 206 U/ML

## 2023-03-01 RX ORDER — HEPARIN 100 UNIT/ML
500 SYRINGE INTRAVENOUS
Status: CANCELLED | OUTPATIENT
Start: 2023-05-04

## 2023-03-01 RX ORDER — ONDANSETRON 2 MG/ML
8 INJECTION INTRAMUSCULAR; INTRAVENOUS
Status: CANCELLED | OUTPATIENT
Start: 2023-03-11

## 2023-03-01 RX ORDER — ONDANSETRON 2 MG/ML
8 INJECTION INTRAMUSCULAR; INTRAVENOUS
Status: CANCELLED | OUTPATIENT
Start: 2023-05-04

## 2023-03-01 RX ORDER — HEPARIN 100 UNIT/ML
500 SYRINGE INTRAVENOUS
Status: CANCELLED | OUTPATIENT
Start: 2023-04-27

## 2023-03-01 RX ORDER — ONDANSETRON 2 MG/ML
8 INJECTION INTRAMUSCULAR; INTRAVENOUS
Status: CANCELLED | OUTPATIENT
Start: 2023-03-31

## 2023-03-01 RX ORDER — SODIUM CHLORIDE 0.9 % (FLUSH) 0.9 %
10 SYRINGE (ML) INJECTION
Status: CANCELLED | OUTPATIENT
Start: 2023-04-27

## 2023-03-01 RX ORDER — HEPARIN 100 UNIT/ML
500 SYRINGE INTRAVENOUS
Status: CANCELLED | OUTPATIENT
Start: 2023-03-31

## 2023-03-01 RX ORDER — SODIUM CHLORIDE 0.9 % (FLUSH) 0.9 %
10 SYRINGE (ML) INJECTION
Status: CANCELLED | OUTPATIENT
Start: 2023-03-31

## 2023-03-01 RX ORDER — HEPARIN 100 UNIT/ML
500 SYRINGE INTRAVENOUS
Status: CANCELLED | OUTPATIENT
Start: 2023-03-11

## 2023-03-01 RX ORDER — ONDANSETRON 2 MG/ML
8 INJECTION INTRAMUSCULAR; INTRAVENOUS
Status: CANCELLED | OUTPATIENT
Start: 2023-04-27

## 2023-03-01 RX ORDER — SODIUM CHLORIDE 0.9 % (FLUSH) 0.9 %
10 SYRINGE (ML) INJECTION
Status: CANCELLED | OUTPATIENT
Start: 2023-05-04

## 2023-03-01 RX ORDER — SODIUM CHLORIDE 0.9 % (FLUSH) 0.9 %
10 SYRINGE (ML) INJECTION
Status: CANCELLED | OUTPATIENT
Start: 2023-03-11

## 2023-03-02 ENCOUNTER — TELEPHONE (OUTPATIENT)
Dept: HEMATOLOGY/ONCOLOGY | Facility: CLINIC | Age: 68
End: 2023-03-02
Payer: MEDICAID

## 2023-03-02 ENCOUNTER — OFFICE VISIT (OUTPATIENT)
Dept: HEMATOLOGY/ONCOLOGY | Facility: CLINIC | Age: 68
End: 2023-03-02
Attending: INTERNAL MEDICINE
Payer: MEDICAID

## 2023-03-02 ENCOUNTER — DOCUMENTATION ONLY (OUTPATIENT)
Dept: HEMATOLOGY/ONCOLOGY | Facility: CLINIC | Age: 68
End: 2023-03-02
Payer: MEDICAID

## 2023-03-02 ENCOUNTER — INFUSION (OUTPATIENT)
Dept: INFUSION THERAPY | Facility: HOSPITAL | Age: 68
End: 2023-03-02
Attending: INTERNAL MEDICINE
Payer: MEDICAID

## 2023-03-02 VITALS
HEART RATE: 120 BPM | DIASTOLIC BLOOD PRESSURE: 63 MMHG | BODY MASS INDEX: 22.5 KG/M2 | OXYGEN SATURATION: 100 % | RESPIRATION RATE: 20 BRPM | HEIGHT: 63 IN | WEIGHT: 127 LBS | TEMPERATURE: 98 F | SYSTOLIC BLOOD PRESSURE: 100 MMHG

## 2023-03-02 DIAGNOSIS — T45.1X5A CHEMOTHERAPY INDUCED NEUTROPENIA: ICD-10-CM

## 2023-03-02 DIAGNOSIS — E27.8 ADRENAL NODULE: ICD-10-CM

## 2023-03-02 DIAGNOSIS — C50.911 MALIGNANT NEOPLASM OF RIGHT FEMALE BREAST, UNSPECIFIED ESTROGEN RECEPTOR STATUS, UNSPECIFIED SITE OF BREAST: Primary | ICD-10-CM

## 2023-03-02 DIAGNOSIS — C50.911 MALIGNANT NEOPLASM OF RIGHT FEMALE BREAST, UNSPECIFIED ESTROGEN RECEPTOR STATUS, UNSPECIFIED SITE OF BREAST: ICD-10-CM

## 2023-03-02 DIAGNOSIS — I82.5Z1 CHRONIC DEEP VEIN THROMBOSIS (DVT) OF DISTAL VEIN OF RIGHT LOWER EXTREMITY: ICD-10-CM

## 2023-03-02 DIAGNOSIS — T45.1X5A CHEMOTHERAPY-INDUCED NEUROPATHY: ICD-10-CM

## 2023-03-02 DIAGNOSIS — E04.2 MULTINODULAR GOITER: ICD-10-CM

## 2023-03-02 DIAGNOSIS — T45.1X5A CHEMOTHERAPY-INDUCED NEUROPATHY: Primary | ICD-10-CM

## 2023-03-02 DIAGNOSIS — C78.7 LIVER METASTASIS: Primary | ICD-10-CM

## 2023-03-02 DIAGNOSIS — C78.7 LIVER METASTASES: ICD-10-CM

## 2023-03-02 DIAGNOSIS — G62.0 CHEMOTHERAPY-INDUCED NEUROPATHY: Primary | ICD-10-CM

## 2023-03-02 DIAGNOSIS — C50.911 RECURRENT BREAST CANCER, RIGHT: ICD-10-CM

## 2023-03-02 DIAGNOSIS — E04.1 RIGHT THYROID NODULE: ICD-10-CM

## 2023-03-02 DIAGNOSIS — G62.0 CHEMOTHERAPY-INDUCED NEUROPATHY: ICD-10-CM

## 2023-03-02 DIAGNOSIS — R59.0 AXILLARY LYMPHADENOPATHY: ICD-10-CM

## 2023-03-02 DIAGNOSIS — M54.50 RIGHT-SIDED LOW BACK PAIN WITHOUT SCIATICA, UNSPECIFIED CHRONICITY: ICD-10-CM

## 2023-03-02 DIAGNOSIS — D70.1 CHEMOTHERAPY INDUCED NEUTROPENIA: ICD-10-CM

## 2023-03-02 PROCEDURE — 99213 OFFICE O/P EST LOW 20 MIN: CPT | Mod: PBBFAC | Performed by: INTERNAL MEDICINE

## 2023-03-02 PROCEDURE — 1160F PR REVIEW ALL MEDS BY PRESCRIBER/CLIN PHARMACIST DOCUMENTED: ICD-10-PCS | Mod: CPTII,,, | Performed by: INTERNAL MEDICINE

## 2023-03-02 PROCEDURE — 3078F DIAST BP <80 MM HG: CPT | Mod: CPTII,,, | Performed by: INTERNAL MEDICINE

## 2023-03-02 PROCEDURE — 63600175 PHARM REV CODE 636 W HCPCS: Mod: JZ | Performed by: INTERNAL MEDICINE

## 2023-03-02 PROCEDURE — 3288F FALL RISK ASSESSMENT DOCD: CPT | Mod: CPTII,,, | Performed by: INTERNAL MEDICINE

## 2023-03-02 PROCEDURE — 1101F PT FALLS ASSESS-DOCD LE1/YR: CPT | Mod: CPTII,,, | Performed by: INTERNAL MEDICINE

## 2023-03-02 PROCEDURE — 3008F PR BODY MASS INDEX (BMI) DOCUMENTED: ICD-10-PCS | Mod: CPTII,,, | Performed by: INTERNAL MEDICINE

## 2023-03-02 PROCEDURE — 3074F SYST BP LT 130 MM HG: CPT | Mod: CPTII,,, | Performed by: INTERNAL MEDICINE

## 2023-03-02 PROCEDURE — 96372 THER/PROPH/DIAG INJ SC/IM: CPT

## 2023-03-02 PROCEDURE — 1101F PR PT FALLS ASSESS DOC 0-1 FALLS W/OUT INJ PAST YR: ICD-10-PCS | Mod: CPTII,,, | Performed by: INTERNAL MEDICINE

## 2023-03-02 PROCEDURE — 3074F PR MOST RECENT SYSTOLIC BLOOD PRESSURE < 130 MM HG: ICD-10-PCS | Mod: CPTII,,, | Performed by: INTERNAL MEDICINE

## 2023-03-02 PROCEDURE — 1125F AMNT PAIN NOTED PAIN PRSNT: CPT | Mod: CPTII,,, | Performed by: INTERNAL MEDICINE

## 2023-03-02 PROCEDURE — 3288F PR FALLS RISK ASSESSMENT DOCUMENTED: ICD-10-PCS | Mod: CPTII,,, | Performed by: INTERNAL MEDICINE

## 2023-03-02 PROCEDURE — 1125F PR PAIN SEVERITY QUANTIFIED, PAIN PRESENT: ICD-10-PCS | Mod: CPTII,,, | Performed by: INTERNAL MEDICINE

## 2023-03-02 PROCEDURE — 3078F PR MOST RECENT DIASTOLIC BLOOD PRESSURE < 80 MM HG: ICD-10-PCS | Mod: CPTII,,, | Performed by: INTERNAL MEDICINE

## 2023-03-02 PROCEDURE — 1159F PR MEDICATION LIST DOCUMENTED IN MEDICAL RECORD: ICD-10-PCS | Mod: CPTII,,, | Performed by: INTERNAL MEDICINE

## 2023-03-02 PROCEDURE — 1160F RVW MEDS BY RX/DR IN RCRD: CPT | Mod: CPTII,,, | Performed by: INTERNAL MEDICINE

## 2023-03-02 PROCEDURE — 99214 PR OFFICE/OUTPT VISIT, EST, LEVL IV, 30-39 MIN: ICD-10-PCS | Mod: S$PBB,,, | Performed by: INTERNAL MEDICINE

## 2023-03-02 PROCEDURE — 1159F MED LIST DOCD IN RCRD: CPT | Mod: CPTII,,, | Performed by: INTERNAL MEDICINE

## 2023-03-02 PROCEDURE — 3008F BODY MASS INDEX DOCD: CPT | Mod: CPTII,,, | Performed by: INTERNAL MEDICINE

## 2023-03-02 PROCEDURE — 99214 OFFICE O/P EST MOD 30 MIN: CPT | Mod: S$PBB,,, | Performed by: INTERNAL MEDICINE

## 2023-03-02 RX ORDER — CIPROFLOXACIN 500 MG/1
500 TABLET ORAL 2 TIMES DAILY
Qty: 14 TABLET | Refills: 0 | Status: SHIPPED | OUTPATIENT
Start: 2023-03-02 | End: 2023-03-09

## 2023-03-02 RX ADMIN — FILGRASTIM-SNDZ 480 MCG: 480 INJECTION, SOLUTION INTRAVENOUS; SUBCUTANEOUS at 09:03

## 2023-03-02 NOTE — PROGRESS NOTES
Past Medical History:   Diagnosis Date    Breast cancer     Liver metastases    Past medical history: Obesity.  Tobacco abuse.   -2020: Acute nonocclusive DVT in the right distal femoral vein; acute occlusive DVTs right popliteal and right peroneal veins ((she never followed up on that)  Procedure/surgical history: Cholecystectomy ().  Bilateral tubal ligation (according to her).  Social history: .  Lives in [place], [place].  Has 2 children.  Children.  Does not work.  Smoked half a pack of cigarettes daily for 40 years; quit 2 weeks ago.  No alcohol or illicit drugs.  Family history: No family history of cancer.  Health maintenance: According to her, screening colonoscopy 5 years ago at [facility], probably revealing a benign polyp.  Menstrual and OB/GYN history: Menarche, age 13.  Menopause, age 59.  G2, P2.  No abortions or miscarriages.  Age at delivery of first child, 22.  Did not breast-feed her children.  Used birth control pills for 1 year, subsequently, bilateral tubal ligation.  No history of hormone replacement therapy.  Past Surgical History:   Procedure Laterality Date    BREAST BIOPSY      CHOLECYSTECTOMY        Social History     Socioeconomic History    Marital status:    Tobacco Use    Smoking status: Former     Packs/day: 0.25     Years: 40.00     Pack years: 10.00     Types: Cigarettes     Quit date: 3/5/2022     Years since quittin.9    Smokeless tobacco: Never   Substance and Sexual Activity    Alcohol use: Not Currently    Drug use: Never    Sexual activity: Yes      Family History   Problem Relation Age of Onset    Cancer Neg Hx         Reason for Follow-up:  -IDC right breast, axillary lymph node biopsy +, presumed triple negative, AJCC stage IIB, clinical prognostic stage IIIB  -liver metastases; adrenal lesions  -History of right lower extremity DVT    -right thyroid lesion       History of Present Illness:   Breast Cancer        Oncologic/Hematologic  History:  Oncology History   Breast cancer, right   1/7/2022 Cancer Staged    Staging form: Breast, AJCC 8th Edition  - Clinical stage from 1/7/2022: Stage IIIA (cT2, cN1, cM0, G3, ER+, NM-, HER2-)       4/7/2022 Initial Diagnosis    Breast cancer     4/13/2022 - 6/3/2022 Chemotherapy    Treatment Summary   Plan Name: OP BREAST DOSE-DENSE AC - DOXORUBICIN CYCLOPHOSPHAMIDE Q2W  Treatment Goal: Control  Status: Inactive  Start Date: 4/13/2022  End Date: 6/3/2022  Provider: Gurmeet Hatfield MD  Chemotherapy: DOXOrubicin chemo injection 112 mg, 60 mg/m2 = 112 mg, Intravenous, Clinic/HOD 1 time, 4 of 4 cycles  Administration: 112 mg (5/5/2022), 112 mg (5/19/2022), 112 mg (6/2/2022)  cyclophosphamide 600 mg/m2 = 1,120 mg in sodium chloride 0.9% 250 mL chemo infusion, 600 mg/m2 = 1,120 mg, Intravenous, Clinic/HOD 1 time, 4 of 4 cycles  Administration: 1,100 mg (5/5/2022), 1,120 mg (5/19/2022), 1,100 mg (6/2/2022)       8/31/2022 - 10/13/2022 Chemotherapy    Treatment Summary   Plan Name: OP BREAST PACLITAXEL Q2W  Treatment Goal: Control  Status: Inactive  Start Date: 8/31/2022  End Date: 10/13/2022  Provider: Gurmeet Hatfield MD  Chemotherapy: PACLitaxeL (TAXOL) 175 mg/m2 = 312 mg in sodium chloride 0.9% 500 mL chemo infusion, 175 mg/m2 = 312 mg, Intravenous, Clinic/HOD 1 time, 4 of 4 cycles  Administration: 312 mg (8/31/2022), 312 mg (9/14/2022), 312 mg (9/28/2022), 312 mg (10/12/2022)       2/23/2023 -  Chemotherapy    Treatment Summary   Plan Name: OP ERIBULIN Q3W  Treatment Goal: Palliative  Status: Active  Start Date: 2/23/2023  End Date: 10/13/2023 (Planned)  Provider: Gurmeet Hatfield MD  Chemotherapy: eriBULin (HALAVEN) 1.75 mg in sodium chloride 0.9% 118.5 mL IVPB, 1.1 mg/m2 = 1.75 mg (78.6 % of original dose 1.4 mg/m2), Intravenous, Clinic/HOD 1 time, 1 of 12 cycles  Dose modification: 1.1 mg/m2 (original dose 1.4 mg/m2, Cycle 1, Reason: MD Discretion, Comment: Renal dose)  Administration: 1.75 mg  (2/23/2023)       Liver metastasis   2/8/2023 Initial Diagnosis    Liver metastasis     2/23/2023 -  Chemotherapy    Treatment Summary   Plan Name: OP ERIBULIN Q3W  Treatment Goal: Palliative  Status: Active  Start Date: 2/23/2023  End Date: 10/13/2023 (Planned)  Provider: Gurmeet Hatfield MD  Chemotherapy: eriBULin (HALAVEN) 1.75 mg in sodium chloride 0.9% 118.5 mL IVPB, 1.1 mg/m2 = 1.75 mg (78.6 % of original dose 1.4 mg/m2), Intravenous, Clinic/HOD 1 time, 1 of 12 cycles  Dose modification: 1.1 mg/m2 (original dose 1.4 mg/m2, Cycle 1, Reason: MD Discretion, Comment: Renal dose)  Administration: 1.75 mg (2/23/2023)        66-year-old lady referred from surgery, with breast cancer.     She initially presented to emergency department 11/15/2021 for evaluation of breast pain and a palpable lump in the right breast.  Subsequent imaging studies and biopsy established diagnosis of invasive ductal carcinoma of right breast, axillary lymph node positive, ER low positive, FL negative, and HER-2 negative.    Oncologic history:   #IDC right breast, presumed triple negative:   -History of right lower extremity DVT and right breast mass 02/2020; she did not follow-up   -Presentation: 11/2021: Palpable mass   -01/07/2022: Bilateral diagnostic mammogram with contrast and limited ultrasound right breast   -01/07/2022: Biopsy   -3.1 cm mass on mammogram, overall grade 3, right axillary lymph node biopsy positive   -ER low positive (1%).  FL negative (0%).  HER2 equivocal (Score 2+).  HER-2 negative by FISH.  Ki-67 high proliferation (50%)   >>>   For the purpose of neoadjuvant chemotherapy, since ER is low positive, we will treat the patient as if triple negative   >>>  -cT2 cN1 MX, grade 3, presumed triple negative, AJCC anatomic stage IIB, clinical prognostic stage IIIB   -02/22/2022: DEXA scan: Normal BMD   -02/22/2022: CT C/A/P with contrast for staging: Right breast mass with right axillary lymphadenopathy; bilateral adrenal  nodules, statistically representing adenomas   -02/22/2022: Whole-body nuclear medicine bone scan: No bone metastasis   -03/02/2022: Mediport placed   -03/11/2022: TTE: LVEF 55-60%  -neoadjuvant DD AC started 04/13/2022; severe neutropenia, ANC 0.03, on 04/26/2022; therefore, cycle 2 held; treated with growth factor and prophylactic ciprofloxacin  -cycle 2 on 05/05/2022; cycle 3 on 05/19/2022; cycle 4 on 06/02/2022   -some progression on restaging mammogram and ultrasound 06/17/2022  -06/21/2022:  She was supposed to start neoadjuvant dose dense Taxol but was sent to emergency department for evaluation because she was feeling extremely weak, sick, unable to walk for 2-3 weeks  -06/21/2022:  WBC, differential unremarkable.  Hemoglobin 9.2, stable.  Platelets 210 K. ANC 5.6.  CMP stable and within acceptable limits.  Magnesium normal.  BNP normal.  TSH 2.2180, normal.    -06/21/2022:  V/Q scan:  Normal/very low probability of pulmonary embolism  -07/15/2022:  CT C/A/P with contrast (comparison:  02/22/2022):   Mass in the right breast with associated skin thickening in the right breast overall slightly less prominent than the prior examination.  The right axillary lymphadenopathy is also less prominent than the prior examination   Interval development of multiple punctate hypoattenuating areas within the liver concerning for possible developing metastatic foci.  Short-term follow-up is recommended.  These nodules are too small to characterize and are all subcentimeter in size.   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  CA 27.29 level normal.  CA 15-3 level normal.  -neoadjuvant dose dense Taxol started 08/31/2022  -neoadjuvant dose dense Taxol: Cycle 1 (08/31/2022); cycle 2 (09/14/2022)  -09/09/2022: Whole-body nuclear medicine bone scan (comparison:  Bone scan 02/22/2022; CT C/A/P 0 07/15/2022): No bone metastasis  -09/19/2022: PET-CT to rule out metastatic disease (comparison:  09/09/2022 bone scan; 07/15/2022 CT C/A/P):  1. Slight interval enlargement of hypermetabolic right breast mass and associated right axillary node.  2. Metastatic right hepatic lesion (central aspect of right liver, 1.9 x 1.2 cm), with no other definite FDG-avid or aggressive appearing process through the neck, chest, abdomen, or pelvis.  3. Subcentimeter [place] foci are less conspicuous and again of limited characterization due to size and non-contrast protocol, but statistically favored to represent benign cysts.  Close attention on surveillance imaging is needed in order to ensure ongoing stability.  4. Incidental right adrenal adenoma (1.7 x 4.3 cm)  5. Diffuse osseous uptake is favored to reflect sequela of systemic therapeutic agent.  6. Hypermetabolic low-density nodule posterior right hemithyroid, 1.6 x 1.4 cm, maximum SUV 4.6, every -neoadjuvant dose dense Taxol:  Cycle 3 (09/28/2022); cycle 4 (10/12/2022)  -10/17/2022:  Thyroid ultrasound:  1. Multinodular thyroid.  2. The dominant right upper thyroid pole nodule previously demonstrated hypermetabolic activity, warranting biopsy.  3. Dominant central left thyroid nodule meets biopsy criteria.  4. Additional nodules do not meet biopsy criteria secondary to insufficient size/sonographic characteristics; recommendations for ultrasound surveillance  provided above.  -10/21/2022:  MRI abdomen with and without contrast (comparison:  09/19/2022):  1. There are 2 small right hepatic lobe lesions suspicious for metastases.  One of these was hypermetabolic on recent PET-CT.  Right lobe lesion 9 mm, corresponding to hypermetabolic lesion on PET-CT.  More inferiorly in right hepatic lobe 6 mm, this was not evident on PET  2. Bilateral adrenal adenomas show no significant change going back to February 2022.  -11/04/2022: IR:  Liver lesion too small for biopsy  -11/04/2022:  Restaging bilateral diagnostic mammogram with contrast and limited ultrasound right breast (comparison:  06/17/2022):  Right breast mass 3.8 x 2.8 x 3.4 cm, previously 3.2 x 3.1 x 3.6 cm (06/17/2022) and 3 x 3.1 x 2.1 cm) 12/21/2021); right axillary lymph node 2.6 x 1 x 1.4 cm, previously 3.6 x 1.3 x 1.3 cm (06/17/2022) and 3.5 x 0.9 x 1.4 cm (12/21/2021) (right breast malignancy with nipple and skin involvement; compared to 06/17/2022, there has been interval development of at least 2 adjacent satellite lesions upper outer right breast and interval progression in the associated calcifications; total satellite lesions measure 3.6 x 5.0 x 5.3 cm, previously 3.8 x 3.5 x 3 cm on 06/17/2022 and 3.5 by 2.6 x 2.8 cm on 01/07/2022)  -12/08/2022:  Ultrasound-guided biopsy, bilateral (FNA): Pathology:  1. Fine needle aspiration of left thyroid nodule: Unsatisfactory. Insufficient cells for diagnostic evaluation.   2. Fine needle aspiration of right thyroid nodule: Cytomorphology  of a benign thyroid nodule.  -01/09/2023: Restaging CTs C/A/P with contrast (comparison:  PET-CT 09/19/2022, MRI abdomen 09/21/2022):   1. Interval progression of disease  2. Slight interval increase in size of right breast mass (4.6 x 3 cm, previously 4 x 3 cm)  3. Interval increase in size of right axillary lymph node (1.9 cm, previously 1.1 cm)  4. Interval increase in size and number of hepatic metastases (largest mass with  satellite nodularity right lobe 7.3 x 7.1 cm, previously 2 cm)  -01/30/2023:  Ultrasound soft tissues of the neck pelvic comparison:  10/17/2022):   The hypermetabolic right thyroid lobe upper pole dominant 1.8 cm diameter isoechoic nodule appear slightly enlarged from prior 1.5 cm.   Grossly stable 1.6 cm diameter cystic and solid left thyroid lobe nodule with partially obscured borders, defer to prior FNA results.  -01/31/2023: Restaging whole-body nuclear medicine bone scan pelvic comparison: CT C/A/P 0 01/09/2023):  No bone metastasis  -02/02/2023:  CT-guided right liver biopsy: Pathology:  Metastatic carcinoma, consistent with a breast primary  -01/23/2023:  CEA 5.10, elevated; CA 27.29 level 49.1, elevated; CA 15-3 level 22, normal        02/10/2022:  Presents for initial medical oncology consultation, accompanied by her .     Interval History:  PORT FLUSH   OP ERIBULIN Q3W   03/02/2023:   -02/02/2023: Liver biopsy (metastasis from breast cancer):  NGS:  Nondiagnostic study (unable to amplified DNA/RNA); no PD-L1 expression for Keytruda based upon both TPS (< 1%) and CPS (<1)  -02/17/2023:  CTs C/A/P with contrast for staging before starting eribulin (comparison:  01/09/2023):  Large conglomerate region of hepatic metastatic infiltration, significantly larger in the interval.   Right breast mass larger in the interval.   Necrotic right metastatic axillary lymph node, larger in the interval.  -eribulin started 02/23/2023 (dose reduced to 1.1 mg per m2 due to reduced creatinine clearance)  -02/23/2023:  Calcium 10.7.  Albumin 2.9.  Hemoglobin 11.5.  .  Alk-phos 427.  ALT, bilirubin normal.  CEA level 25.49, elevated and rising (was 5.1 on 01/23/2023):  CA 27.29 level 206, elevated and rising (was 49.1 on 01/23/2023).  CA 15-3 level 81, elevated and rising (was 22, normal, on 01/23/2023).    WBC 1.8.  Hemoglobin 10.6.  Platelets 161 K. ANC 0.468, very low   Presents for follow-up visit.  In a  wheelchair.  At home, up and about.  ECOG 2.  Main complaint is pain over the right anterior chest wall and in the back but no lower extremity weakness, numbness, or loss of bladder or bowel control.  Fair appetite.  Complains of weakness and fatigue.  Some exertional dyspnea.  Some abdominal pain and constipation.  Numbness and tingling in hands and feet, not severe.  Back pain is 9/10 severity.  She has pain medications at home.  Severely neutropenic today.  No fevers, chills, sore throat, cough, tion, dysuria, frequency of micturition, etc..      Medications:  Current Outpatient Medications on File Prior to Visit   Medication Sig Dispense Refill    dexAMETHasone (DECADRON) 4 MG Tab Take 5 tablets (20 mg total) by mouth As instructed (for chemotherapy). Take 5 tablets (20 mg total) po 12 hours before treatment and then repeat 6 hours before chemotherapy treatment (Patient not taking: Reported on 11/29/2022) 30 tablet 0    HYDROcodone-acetaminophen (NORCO) 5-325 mg per tablet Take 1 tablet by mouth every 4 (four) hours as needed for Pain. 60 tablet 0    ondansetron (ZOFRAN) 8 MG tablet Take 1 tablet (8 mg total) by mouth every 8 (eight) hours as needed for Nausea. (Patient not taking: Reported on 11/29/2022) 30 tablet 2     No current facility-administered medications on file prior to visit.       Review of Systems:   All systems reviewed and found to be negative except for the symptoms detailed above    Physical Examination:   VITAL SIGNS:   Vitals:    03/02/23 0846   BP: 100/63   Pulse: (!) 120   Resp: 20   Temp: 97.7 °F (36.5 °C)         GENERAL:  In no apparent distress.    HEAD:  No signs of head trauma.  EYES:  Pupils are equal.  Extraocular motions intact.    EARS:  Hearing grossly intact.  MOUTH:  Oropharynx is normal.   NECK:  No adenopathy, no JVD.     CHEST:  Chest with clear breath sounds bilaterally.  No wheezes, rales, rhonchi.    CARDIAC:  Regular rate and rhythm.  S1 and S2, without murmurs,  gallops, rubs.  VASCULAR:  No Edema.  Peripheral pulses normal and equal in all extremities.  ABDOMEN:  Soft, without detectable tenderness.  No sign of distention.  No   rebound or guarding, and no masses palpated.   Bowel Sounds normal.  MUSCULOSKELETAL:  Good range of motion of all major joints. Extremities without clubbing, cyanosis or edema.    NEUROLOGIC EXAM:  Alert and oriented x 3.  No focal sensory or strength deficits.   Speech normal.  Follows commands.  PSYCHIATRIC:  Mood normal.    No results for input(s): CBC in the last 72 hours.   No results for input(s): CMP in the last 72 hours.     Assessment:  Problem List Items Addressed This Visit          Neuro    Chemotherapy-induced neuropathy       Hematology    Chronic deep vein thrombosis (DVT) of distal vein of right lower extremity       Oncology    Breast cancer, right - Primary    Liver metastases    Recurrent breast cancer, right       Endocrine    Adrenal nodule    Right thyroid nodule    Multinodular goiter       Orthopedic    Pain in lower back       Other    Axillary lymphadenopathy     IDC right breast, practically triple negative:  -IDC right breast, high-grade, practically triple negative (ER 1%, low +; OK negative; HER2 2+, equivocal; HER2 negative by FISH)  -right breast mass initially noted 02/2020; she did not follow-up;   -biopsy after 2 years (01/07/2022)  -ER low + (1%).  OK negative (0%).  HER2 negative.  Ki-67 high proliferation (50%).  -3.1 cm mass on mammogram.  Grade 3. Right axillary lymph node biopsy +  -cT2 cN1 MX, grade 3, practically triple negative, AJCC anatomic stage IIB, clinical prognostic stage IIIB  -S/P neoadjuvant ddAC x4 (04/13/2022-06/02/2022), with some progression on restaging mammogram and ultrasound 06/17/2022  -S/P neoadjuvant dose dense Taxol x4 (08/31/2022-10/12/2022)  >> progression of right breast lesion but right axillary lymph node smaller on restaging bilateral diagnostic mammogram with contrast and  limited ultrasound right breast 11/04/2022  >>>  Radiologic progression, with worsening liver metastasis:  -suspicion of small liver metastases on PET-CT 09/19/2022 and MRI abdomen 10/21/2022 (1.9 x 1.2 cm on PET-CT but 9 mm on MRI); per IR, too small for biopsy  -01/04/2023: Says that the lump in right breast is back  -disease progression on restaging CTs C/A/P 01/09/2023 (enlargement of right breast mass and right axillary lymph node; increase in size and number of liver metastasis, largest mass 7.3 x 7.1 cm, previously 2 cm)  -01/23/2023: No significant symptoms; ECOG 1  -01/23/2023:  CEA 5.10, elevated; CA 27.29 level 49.1, elevated; CA 15-3 level 22, normal  -no bone metastases on bone scan 01/31/2023  -CT-guided liver biopsy 02/02/2023: Metastatic carcinoma, consistent with breast primary  -liver biopsy 02/02/2023:  No PD-L1 expression for Keytruda ; NGS study nondiagnostic due to inability to [place] 5 DNA/RNA   -progression of metastasis on CTs C/A/P 0 02/17/2023, performed before starting eribulin  -eribulin started 02/23/2023 (dose reduced to 1.1 mg per m2 due to reduced creatinine clearance)      Sites of disease:  Enlarging right breast mass post neoadjuvant ddAC and Taxol  Right axillary lymphadenopathy  Liver metastases, largest 7.3 x 7.1 cm on CT      Molecular markers:  -breast biopsy (01/07/2022):    ER low + (1%).  MA negative (0%).  HER2 equivocal (2+).  HER2 negative.  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 Multinodular thyroid.  2. The dominant right upper thyroid pole nodule previously  demonstrated hypermetabolic activity, warranting biopsy.  3. Dominant central left thyroid nodule meets biopsy criteria.  4. Additional nodules do not meet biopsy criteria secondary to insufficient size/sonographic characteristics; recommendations for ultrasound surveillance provided above.  -12/08/2022:  Ultrasound-guided biopsy, bilateral (FNA): Pathology:  1. Fine needle aspiration of left thyroid nodule: Unsatisfactory. Insufficient cells for diagnostic evaluation.   2. Fine needle aspiration of right thyroid nodule: Cytomorphology  of a benign thyroid nodule.        History of right lower extremity DVT 02/19/2020:  -02/19/2020: (Right leg swelling for 3 days) acute nonocclusive DVT in the right distal femoral vein; acute occlusive DVTs right popliteal and right peroneal veins  -Apparently, at the same time, was also noted to have a breast lump  Which could only be biopsied on 01/07/2022, 2 years later, because of for noncompliance with follow-up (right breast, 3:00, a small lump, nontender, slightly irregular on palpation, not hard or fluctuant, no erythema) (apparently, [facility] ambulatory clinics tried to contact her but without any response )        Plan:  WBC 1.8.  Hemoglobin 10.6.  Platelets 161 K. ANC 0.468, very low   Hold chemotherapy today   Start ciprofloxacin 500 mg p.o. b.i.d. for 7 days   Start Neupogen 480 mcg subQ daily for 5 days  On Monday, recheck CBC and CMP; to visit with NP    -CT-guided liver biopsy 02/02/2023: Metastatic carcinoma, consistent with breast primary  Metastatic disease, developing after neoadjuvant ddAC followed by neoadjuvant Taxol (completed 10/12/2022)   In this situation, no indication of surgical resection of breast mass  Sites of disease:  Enlarging right breast mass; right axillary lymphadenopathy; increasing # and size of liver metastases, largest 7.3 x 7.1 cm  -liver biopsy 02/02/2023:  No PD-L1 expression for Keytruda ; NGS study nondiagnostic due to inability to amplify  DNA/RNA   -progression of metastasis on CTs C/A/P 0 02/17/2023, performed before starting eribulin  -eribulin started 02/23/2023 (dose reduced to 1.1 mg per m2 due to reduced creatinine clearance)  >>>  Please ask lab to check ER, NV, HER2 status of liver biopsy  Genetic testing is pending; need ASAP  Please order liquid biopsy (NGS testing on liver biopsy was not feasible)  Brain MRI with and without contrast is pending  Will avoid Adriamycin, Cytoxan, and Taxol because patient progressed on neoadjuvant ddAC and neoadjuvant Taxol  Need germline BRCA1/2 testing ASAP   Continue eribulin  From cycle 2 of eribulin, will add Neulasta support (because, developed severe neutropenia 7 days after day 1 cycle 1 of eribulin  Check CBC and CMP prior to each dose of eribulin   Check tumor markers including CEA, CA 15-3, and CA 27.29 level every month to assess response (next, around 03/23/2023)   Re-stage with contrast enhanced CT scans of C/A/P 2 months (around 04/23/2023) after starting eribulin (02/23/2023)    Continue Norco for pain in right breast and in the back    Eribulin:  Schedule:  Days 1 and 8: Eribulin 1.4 mg/m² IV push  Repeat cycle every 3 weeks  IV infusion over 2-5 minutes    Warnings/precautions with eribulin:  1.  Bone marrow suppression.  Severe neutropenia and neutropenic fever.  2.  Peripheral neuropathy  3.  QT prolongation    Adverse reactions with eribulin:  >10%  Peripheral edema, fatigue, peripheral neuropathy, headache, alopecia, hypokalemia, hypocalcemia, nausea, constipation, abdominal pain, neutropenia, anemia, increased ALT, increased AST, etc.    Follow-up with NP Monday, with CBC and CMP.  Follow-up visit with me in 1 month.      Above discussed at length with the patient.  All questions answered.    Discussed labs and scans and gave her copies of relevant records.    Plan of management discussed once again.  Have already explained and reinforced that she has stage IV disease which is incurable  but we can attempt palliation with systemic chemotherapy; response can not be guaranteed and long-term prognosis is guarded.    She understands and agrees with this plan.    Follow-up:  No follow-ups on file.

## 2023-03-02 NOTE — Clinical Note
Orders for today:   WBC 1.8.  Hemoglobin 10.6.  Platelets 161 K. ANC 0.468, very low  Hold chemotherapy today  Start ciprofloxacin 500 mg p.o. b.i.d. for 7 days  Start Neupogen 480 mcg subQ daily for 5 days On Monday, recheck CBC and CMP; to visit with NP  From cycle 2 of chemotherapy onwards, add Neulasta (please ask pharmacy to add orders; I will sign)  Need genetic testing ASAP   Please ask lab to check ER, [place], HER2 status on liver biopsy   Please order liquid biopsy (NGS testing on liver biopsy was not feasible)   Brain MRI with and without contrast is pending   Check CBC and CMP before each dose of eribulin  Check CEA, CA 15-3, and CA 27.29 level around 03/23/2023   Re-stage with contrast enhanced CT scans of C/A/P around 04/23/2023   Continue Norco for pain in the back  Follow-up with NP Monday, with CBC and CMP. Follow-up visit with me in 1 month.

## 2023-03-02 NOTE — NURSING
"Upon entering the patient waiting area, AKHIL Simmons noted patient sitting in hospital wheelchair and tearful. Assessed patient who reports she has pain in her back and she forgot to take her pain medication this morning. Patient reports she is in "a lot of pain" and had not taken any pain medication since yesterday. Informed patient that she she can take her medication as prescribed. Patient stated she has her medication with her. Water provided. Denies any other symptoms/problems. Patient scheduled to see Dr. Hatfield this morning. Verbal report given to MEGAN Marsh LPN.     "

## 2023-03-03 ENCOUNTER — HOSPITAL ENCOUNTER (OUTPATIENT)
Dept: RADIOLOGY | Facility: HOSPITAL | Age: 68
Discharge: HOME | End: 2023-03-03
Attending: INTERNAL MEDICINE
Payer: MEDICAID

## 2023-03-03 ENCOUNTER — OFFICE VISIT (OUTPATIENT)
Dept: FAMILY MEDICINE | Facility: CLINIC | Age: 68
End: 2023-03-03
Payer: MEDICAID

## 2023-03-03 ENCOUNTER — TELEPHONE (OUTPATIENT)
Dept: HEMATOLOGY/ONCOLOGY | Facility: CLINIC | Age: 68
End: 2023-03-03
Payer: MEDICAID

## 2023-03-03 ENCOUNTER — INFUSION (OUTPATIENT)
Dept: INFUSION THERAPY | Facility: HOSPITAL | Age: 68
End: 2023-03-03
Attending: INTERNAL MEDICINE
Payer: MEDICAID

## 2023-03-03 ENCOUNTER — DOCUMENTATION ONLY (OUTPATIENT)
Dept: HEMATOLOGY/ONCOLOGY | Facility: CLINIC | Age: 68
End: 2023-03-03
Payer: MEDICAID

## 2023-03-03 VITALS
WEIGHT: 126.56 LBS | HEART RATE: 88 BPM | RESPIRATION RATE: 20 BRPM | SYSTOLIC BLOOD PRESSURE: 98 MMHG | OXYGEN SATURATION: 100 % | BODY MASS INDEX: 22.43 KG/M2 | DIASTOLIC BLOOD PRESSURE: 64 MMHG | HEIGHT: 63 IN | TEMPERATURE: 98 F

## 2023-03-03 VITALS
DIASTOLIC BLOOD PRESSURE: 60 MMHG | RESPIRATION RATE: 24 BRPM | OXYGEN SATURATION: 100 % | HEART RATE: 87 BPM | TEMPERATURE: 98 F | SYSTOLIC BLOOD PRESSURE: 105 MMHG

## 2023-03-03 DIAGNOSIS — G62.0 CHEMOTHERAPY-INDUCED NEUROPATHY: Primary | ICD-10-CM

## 2023-03-03 DIAGNOSIS — F32.A MILD DEPRESSION: ICD-10-CM

## 2023-03-03 DIAGNOSIS — C78.7 LIVER METASTASES: ICD-10-CM

## 2023-03-03 DIAGNOSIS — T45.1X5A CHEMOTHERAPY-INDUCED NEUROPATHY: Primary | ICD-10-CM

## 2023-03-03 DIAGNOSIS — C50.911 MALIGNANT NEOPLASM OF RIGHT FEMALE BREAST, UNSPECIFIED ESTROGEN RECEPTOR STATUS, UNSPECIFIED SITE OF BREAST: Primary | ICD-10-CM

## 2023-03-03 DIAGNOSIS — C50.911 MALIGNANT NEOPLASM OF RIGHT FEMALE BREAST, UNSPECIFIED ESTROGEN RECEPTOR STATUS, UNSPECIFIED SITE OF BREAST: ICD-10-CM

## 2023-03-03 DIAGNOSIS — C50.911 RECURRENT BREAST CANCER, RIGHT: ICD-10-CM

## 2023-03-03 DIAGNOSIS — Z65.8 PSYCHOSOCIAL STRESSORS: ICD-10-CM

## 2023-03-03 DIAGNOSIS — C78.7 LIVER METASTASIS: ICD-10-CM

## 2023-03-03 DIAGNOSIS — C50.911 RECURRENT CANCER OF RIGHT BREAST: ICD-10-CM

## 2023-03-03 PROCEDURE — 3074F SYST BP LT 130 MM HG: CPT | Mod: CPTII,,, | Performed by: NURSE PRACTITIONER

## 2023-03-03 PROCEDURE — 99205 PR OFFICE/OUTPT VISIT, NEW, LEVL V, 60-74 MIN: ICD-10-PCS | Mod: S$PBB,,, | Performed by: NURSE PRACTITIONER

## 2023-03-03 PROCEDURE — 25500020 PHARM REV CODE 255

## 2023-03-03 PROCEDURE — 1101F PR PT FALLS ASSESS DOC 0-1 FALLS W/OUT INJ PAST YR: ICD-10-PCS | Mod: CPTII,,, | Performed by: NURSE PRACTITIONER

## 2023-03-03 PROCEDURE — A9577 INJ MULTIHANCE: HCPCS

## 2023-03-03 PROCEDURE — 3288F FALL RISK ASSESSMENT DOCD: CPT | Mod: CPTII,,, | Performed by: NURSE PRACTITIONER

## 2023-03-03 PROCEDURE — 3074F PR MOST RECENT SYSTOLIC BLOOD PRESSURE < 130 MM HG: ICD-10-PCS | Mod: CPTII,,, | Performed by: NURSE PRACTITIONER

## 2023-03-03 PROCEDURE — 1159F MED LIST DOCD IN RCRD: CPT | Mod: CPTII,,, | Performed by: NURSE PRACTITIONER

## 2023-03-03 PROCEDURE — 96372 THER/PROPH/DIAG INJ SC/IM: CPT | Mod: 59

## 2023-03-03 PROCEDURE — 1126F AMNT PAIN NOTED NONE PRSNT: CPT | Mod: CPTII,,, | Performed by: NURSE PRACTITIONER

## 2023-03-03 PROCEDURE — 3008F BODY MASS INDEX DOCD: CPT | Mod: CPTII,,, | Performed by: NURSE PRACTITIONER

## 2023-03-03 PROCEDURE — 63600175 PHARM REV CODE 636 W HCPCS: Mod: JZ | Performed by: INTERNAL MEDICINE

## 2023-03-03 PROCEDURE — 3078F DIAST BP <80 MM HG: CPT | Mod: CPTII,,, | Performed by: NURSE PRACTITIONER

## 2023-03-03 PROCEDURE — 70553 MRI BRAIN STEM W/O & W/DYE: CPT | Mod: TC

## 2023-03-03 PROCEDURE — 3078F PR MOST RECENT DIASTOLIC BLOOD PRESSURE < 80 MM HG: ICD-10-PCS | Mod: CPTII,,, | Performed by: NURSE PRACTITIONER

## 2023-03-03 PROCEDURE — 99205 OFFICE O/P NEW HI 60 MIN: CPT | Mod: S$PBB,,, | Performed by: NURSE PRACTITIONER

## 2023-03-03 PROCEDURE — 1159F PR MEDICATION LIST DOCUMENTED IN MEDICAL RECORD: ICD-10-PCS | Mod: CPTII,,, | Performed by: NURSE PRACTITIONER

## 2023-03-03 PROCEDURE — 3288F PR FALLS RISK ASSESSMENT DOCUMENTED: ICD-10-PCS | Mod: CPTII,,, | Performed by: NURSE PRACTITIONER

## 2023-03-03 PROCEDURE — 3008F PR BODY MASS INDEX (BMI) DOCUMENTED: ICD-10-PCS | Mod: CPTII,,, | Performed by: NURSE PRACTITIONER

## 2023-03-03 PROCEDURE — 1101F PT FALLS ASSESS-DOCD LE1/YR: CPT | Mod: CPTII,,, | Performed by: NURSE PRACTITIONER

## 2023-03-03 PROCEDURE — 99214 OFFICE O/P EST MOD 30 MIN: CPT | Mod: PBBFAC,25 | Performed by: NURSE PRACTITIONER

## 2023-03-03 PROCEDURE — 1126F PR PAIN SEVERITY QUANTIFIED, NO PAIN PRESENT: ICD-10-PCS | Mod: CPTII,,, | Performed by: NURSE PRACTITIONER

## 2023-03-03 RX ADMIN — FILGRASTIM-SNDZ 480 MCG: 480 INJECTION, SOLUTION INTRAVENOUS; SUBCUTANEOUS at 12:03

## 2023-03-03 RX ADMIN — GADOBENATE DIMEGLUMINE 12 ML: 529 INJECTION, SOLUTION INTRAVENOUS at 08:03

## 2023-03-03 NOTE — PROGRESS NOTES
"Chief Complaint: "No complaints".    Mental Status Exam    Mood: Euthymic  PHQ:  5 ( 5-=mild depression)  Self Rating: Depression: 0/10 and Anxiety: 0/10  Thought Process: Goal directed  Thought Content: Hallucinations: Not present Delusions: Not present  Speech: No deficits  Attitude: Cooperative    Affect: Full range-mood congruent   Appearance: Clean and Casual  Motor Activity: No deficits patient in a wheelchair  Attention/Concentration: Intact  Judgement: Intact  Orientation: Date: yes, Place: yes, Person: yes, Situations: yes  Memory: Immediate: 3/3, Recent: 3/3, Remote: 3/3  Abstract Thinking: Age Appropriate  Gross Est. Of Intelligence: Average  Assets: verbal and support system   Insight: Limited  Self Harm: Not present  Harm to Others: Not present    Assessments:   PHQ9:   PHQ9 3/3/2023   Total Score 5       Depression Patient Health Questionnaire 3/3/2023   Over the last two weeks how often have you been bothered by little interest or pleasure in doing things Not at all   Over the last two weeks how often have you been bothered by feeling down, depressed or hopeless Not at all   PHQ-2 Total Score 0   Over the last two weeks how often have you been bothered by trouble falling or staying asleep, or sleeping too much Not at all   Over the last two weeks how often have you been bothered by feeling tired or having little energy More than half the days   Over the last two weeks how often have you been bothered by a poor appetite or overeating Nearly every day   Over the last two weeks how often have you been bothered by feeling bad about yourself - or that you are a failure or have let yourself or your family down Not at all   Over the last two weeks how often have you been bothered by trouble concentrating on things, such as reading the newspaper or watching television Not at all   Over the last two weeks how often have you been bothered by moving or speaking so slowly that other people could have noticed. Or " "the opposite - being so fidgety or restless that you have been moving around a lot more than usual. Not at all   Over the last two weeks how often have you been bothered by thoughts that you would be better off dead, or of hurting yourself Not at all   If you checked off any problems, how difficult have these problems made it for you to do your work, take care of things at home or get along with other people? Somewhat difficult   Total Score 5   Interpretation Mild          GAD7:   GAD7 3/3/2023   1. Feeling nervous, anxious, or on edge? 0   2. Not being able to stop or control worrying? 0   3. Worrying too much about different things? 0   4. Trouble relaxing? 0   5. Being so restless that it is hard to sit still? 0   6. Becoming easily annoyed or irritable? 2   7. Feeling afraid as if something awful might happen? 0   8. If you checked off any problems, how difficult have these problems made it for you to do your work, take care of things at home, or get along with other people? 0   ILAN-7 Score 2     Trauma History:  History of Emotional/Mental abuse: Denies  History of Physical abuse: Denies  History of Sexual abuse: Denies  History of other trauma: Denies    Legal:  Denies prior arrests, charges, conviction  Denies any upcoming court dates  Denies any pending charges.    Substance Use:    Hx. Smoking tobacco--onset 21 or 21 y/o--stopped smoking "a couple of months ago".  Was smoking 1/2 ppd.        Family History:     Father was alcoholic.  Denies any  other family mental illness.    Support System: 1)  Family--, sister, brother, kids    Coping Strategies: 1)  "Don't do anything--get through it"    Stressors: 1)  Dxd June, 2022 with  Cancer of the right breast.  "Surgery out of the question--it's spread to the liver" .     Goals:  "Don't have one"    Previous Treatment:   No    Social History:   Grew up in:  [place], [place]  Raised by: Biological parents  Number of siblings: 4  Patient birth order: " "4th  Describes household as:  "sometimes we had problems and sometimes we were ok--dad was an alcoholic"    Education: High School 1977  Marital status:   Number of children: Two  Employment:  Unemployed  Living situation: , 43 y/o daughter and 49 y/o step daughter    Current Presentation:  PCP: DAXA Hatfield MD  Referred For: Depression  Initial Visit: Yes  Last Visit: n/a    Here for initial visits.  States she is unaware of why she has an appointment with this provider--"I don't think I need counseling".    Processes early emotions associated with having cancer diagnosis in right breast.  Initially surprised, but was thinking it might be cancer when she saw the lump. States she is not a candidate for surgery at this time. Initially had 8 chemotherapy sessions--stopped for a while, and began again "a couple of weeks ago".  Unsure of how many more she is to have.  EMR reveals "IDC right breast, axillary lymph node biopsy +, presumed triple negative, AJCC stage IIB, clinical prognostic stage IIIB, -liver lesions; adrenal lesions; -History of right lower extremity DVT--right thyroid lesion.    After high school she stayed home with her mother.  She had a blind date the same night as graduation, and they  about a year later.  They moved around a lot because of 's work, she was vague about why they moved around, and they now live in [place].  She was 22 and 24 y/o when she had her children, and has never been in the workforce.  She and her  have three grandchildren--23 or 24, 18, and 17 y/o.      Processes having support from her family.  Her mother is 91 y/o is alive, and living with patient's sister in [place]. A typical day for patient consists of staying in her bed "most of the day when not feeling well".     States that when she feels well, she helps her 66 y/o cousin with errands and some light housework.  Reports that most days she's up and around.  Patient is still driving " "when she feels up to it.      Time was spent processing patient's desire to discontinue to therapy.  She is adamant that she really doesn't know why she was referred, and feels like she is doing very well at this time.      With patient's permission, this provider brought  patient's 45 y/o daughter into the appointment.  When asked how she thought her mother was doing, she turned and asked patient, "Mommy, how are you doing?".  It was at this point that her marked developmental delay was observed, and it was evident that she could not provide collateral data to assist with evaluation.      Supported patient's decision to terminate therapy.  Assured her she could return whenever she wishes.  Verbalized understanding.        Endorses:   Depression symptoms: denies   Anxiety symptoms: denies    Psychotherapy:  Target symptoms:  Denies symptoms  Why chosen therapy is appropriate versus another modality:  Not applicable   Outcome monitoring methods:  None at this time  Therapeutic intervention type:  Supportive of patient's desire to not continue therapy  Topics discussed/themes:  Breast cancer, treatment, and sequale  The patient's response to the intervention is:  accepting  The patient's progress toward treatment goals is  Patient doesn't wish to continue therapy .       Review of systems:   See most recent ROS per PCP    Assessment:      1. Recurrent cancer of right breast    2. Psychosocial stressors    3. Mild depression           Plan:   Patient Instructions   Meds as directed  Keep all healthcare appointments  Utilize self-interventions from patient education materials  Nearest ER if overwhelmed  No further appointments at this time.  Patient will call if she wishes to return.    I spent 60  minutes in face-to-face psychotherapy with an additional 15    minutes for E/M services on this date of service.       "

## 2023-03-03 NOTE — TELEPHONE ENCOUNTER
Walked to infusion and advise the patient of below, as well as having appts 3/6/23 - labs at 7:30am and see Dr. Hatfield 8:40am.  Pt understood:        Please check with patient how she is doing     Ask her to report to emergency department immediately if she starts having severe headaches, seizure-like activity, or focal neurological symptoms.

## 2023-03-03 NOTE — NURSING
1020  Pt did labs which showed .  Pt to get #2 zarxio today.  Order not signed and awaiting physician to sign.  In the meantime, family medicine called that pt had an appt with them.  Pt taken to appt via WC accomp by nurse and her daughter.  Pt will return for injection.  Pt elizabeth also get injection on Saturday and Sunday.  Pt instructed to get antibiotics from pharmacy.  Pt instructed to return to ER if temp greater than 100.4.  Pt given neutropenic precautions.

## 2023-03-03 NOTE — PROGRESS NOTES
Brain MRI noted.    Patient has brain metastasis.    Please check with patient how she is doing   Refer to Radiation Oncology ASAP for radiotherapy to brain metastasis.  Ask her to report to emergency department immediately if she starts having severe headaches, seizure-like activity, or focal neurological symptoms.    1.  Right cerebellar two new and minimal left temporal lobe medial aspect enhancing lesions are consistent with metastasis.   2.  Right frontal calvarial small enhancing focus suspected for early metastatic focus.   3.  No acute brain infarcts.

## 2023-03-03 NOTE — PATIENT INSTRUCTIONS
Meds as directed  Keep all healthcare appointments  Utilize self-interventions from patient education materials  Nearest ER if overwhelmed  No further appointments at this time.  Patient will call if she wishes to return.

## 2023-03-04 ENCOUNTER — INFUSION (OUTPATIENT)
Dept: INFUSION THERAPY | Facility: HOSPITAL | Age: 68
End: 2023-03-04
Attending: INTERNAL MEDICINE
Payer: MEDICAID

## 2023-03-04 VITALS
RESPIRATION RATE: 20 BRPM | DIASTOLIC BLOOD PRESSURE: 61 MMHG | TEMPERATURE: 98 F | HEART RATE: 102 BPM | SYSTOLIC BLOOD PRESSURE: 92 MMHG | OXYGEN SATURATION: 100 %

## 2023-03-04 DIAGNOSIS — G62.0 CHEMOTHERAPY-INDUCED NEUROPATHY: Primary | ICD-10-CM

## 2023-03-04 DIAGNOSIS — T45.1X5A CHEMOTHERAPY-INDUCED NEUROPATHY: Primary | ICD-10-CM

## 2023-03-04 PROCEDURE — 63600175 PHARM REV CODE 636 W HCPCS: Mod: JZ | Performed by: INTERNAL MEDICINE

## 2023-03-04 PROCEDURE — 96372 THER/PROPH/DIAG INJ SC/IM: CPT

## 2023-03-04 RX ADMIN — FILGRASTIM-SNDZ 480 MCG: 480 INJECTION, SOLUTION INTRAVENOUS; SUBCUTANEOUS at 08:03

## 2023-03-04 NOTE — NURSING
0801  Pt brought upstairs via WC accomp by bed .  Pt here for #3 zarxio injection.  Pt rates LOP to rt breast 7/10.Pt reports cough, SOB, diarrhea, and some weakness.  Instructed pt to go to ER for temp greater than 100.4 or feels bad or increased weakness.

## 2023-03-05 ENCOUNTER — INFUSION (OUTPATIENT)
Dept: INFUSION THERAPY | Facility: HOSPITAL | Age: 68
End: 2023-03-05
Attending: INTERNAL MEDICINE
Payer: MEDICAID

## 2023-03-05 VITALS
DIASTOLIC BLOOD PRESSURE: 55 MMHG | OXYGEN SATURATION: 98 % | RESPIRATION RATE: 20 BRPM | HEART RATE: 108 BPM | TEMPERATURE: 98 F | SYSTOLIC BLOOD PRESSURE: 103 MMHG

## 2023-03-05 DIAGNOSIS — T45.1X5A CHEMOTHERAPY-INDUCED NEUROPATHY: Primary | ICD-10-CM

## 2023-03-05 DIAGNOSIS — G62.0 CHEMOTHERAPY-INDUCED NEUROPATHY: Primary | ICD-10-CM

## 2023-03-05 PROCEDURE — 96372 THER/PROPH/DIAG INJ SC/IM: CPT

## 2023-03-05 PROCEDURE — 63600175 PHARM REV CODE 636 W HCPCS: Mod: JZ | Performed by: INTERNAL MEDICINE

## 2023-03-05 RX ADMIN — FILGRASTIM-SNDZ 480 MCG: 480 INJECTION, SOLUTION INTRAVENOUS; SUBCUTANEOUS at 08:03

## 2023-03-05 NOTE — NURSING
Pt received Zarxio injection without inicdent; discharged pt via wheelchair to her car; pt had own walker.

## 2023-03-06 ENCOUNTER — INFUSION (OUTPATIENT)
Dept: INFUSION THERAPY | Facility: HOSPITAL | Age: 68
End: 2023-03-06
Attending: INTERNAL MEDICINE
Payer: MEDICAID

## 2023-03-06 ENCOUNTER — OFFICE VISIT (OUTPATIENT)
Dept: HEMATOLOGY/ONCOLOGY | Facility: CLINIC | Age: 68
End: 2023-03-06
Attending: INTERNAL MEDICINE
Payer: MEDICAID

## 2023-03-06 VITALS
DIASTOLIC BLOOD PRESSURE: 51 MMHG | TEMPERATURE: 98 F | SYSTOLIC BLOOD PRESSURE: 80 MMHG | RESPIRATION RATE: 20 BRPM | HEART RATE: 105 BPM | HEIGHT: 63 IN | BODY MASS INDEX: 22.5 KG/M2 | WEIGHT: 127 LBS | OXYGEN SATURATION: 97 %

## 2023-03-06 DIAGNOSIS — C50.911 RECURRENT BREAST CANCER, RIGHT: ICD-10-CM

## 2023-03-06 DIAGNOSIS — R63.0 ANOREXIA: ICD-10-CM

## 2023-03-06 DIAGNOSIS — Z17.0 MALIGNANT NEOPLASM OF RIGHT BREAST IN FEMALE, ESTROGEN RECEPTOR POSITIVE, UNSPECIFIED SITE OF BREAST: Primary | ICD-10-CM

## 2023-03-06 DIAGNOSIS — T45.1X5A CHEMOTHERAPY-INDUCED NEUROPATHY: ICD-10-CM

## 2023-03-06 DIAGNOSIS — C78.7 LIVER METASTASES: ICD-10-CM

## 2023-03-06 DIAGNOSIS — Z86.718 HISTORY OF DEEP VENOUS THROMBOSIS (DVT) OF DISTAL VEIN OF RIGHT LOWER EXTREMITY: ICD-10-CM

## 2023-03-06 DIAGNOSIS — G62.0 CHEMOTHERAPY-INDUCED NEUROPATHY: ICD-10-CM

## 2023-03-06 DIAGNOSIS — E04.2 MULTINODULAR GOITER: ICD-10-CM

## 2023-03-06 DIAGNOSIS — C50.911 MALIGNANT NEOPLASM OF RIGHT BREAST IN FEMALE, ESTROGEN RECEPTOR POSITIVE, UNSPECIFIED SITE OF BREAST: Primary | ICD-10-CM

## 2023-03-06 DIAGNOSIS — M54.50 RIGHT-SIDED LOW BACK PAIN WITHOUT SCIATICA, UNSPECIFIED CHRONICITY: ICD-10-CM

## 2023-03-06 DIAGNOSIS — N64.4 PAIN OF RIGHT BREAST: ICD-10-CM

## 2023-03-06 DIAGNOSIS — E27.8 ADRENAL NODULE: ICD-10-CM

## 2023-03-06 DIAGNOSIS — R59.0 AXILLARY LYMPHADENOPATHY: ICD-10-CM

## 2023-03-06 DIAGNOSIS — E04.1 RIGHT THYROID NODULE: ICD-10-CM

## 2023-03-06 PROCEDURE — 1160F RVW MEDS BY RX/DR IN RCRD: CPT | Mod: CPTII,,, | Performed by: INTERNAL MEDICINE

## 2023-03-06 PROCEDURE — 1160F PR REVIEW ALL MEDS BY PRESCRIBER/CLIN PHARMACIST DOCUMENTED: ICD-10-PCS | Mod: CPTII,,, | Performed by: INTERNAL MEDICINE

## 2023-03-06 PROCEDURE — 1101F PR PT FALLS ASSESS DOC 0-1 FALLS W/OUT INJ PAST YR: ICD-10-PCS | Mod: CPTII,,, | Performed by: INTERNAL MEDICINE

## 2023-03-06 PROCEDURE — 3288F FALL RISK ASSESSMENT DOCD: CPT | Mod: CPTII,,, | Performed by: INTERNAL MEDICINE

## 2023-03-06 PROCEDURE — 99214 PR OFFICE/OUTPT VISIT, EST, LEVL IV, 30-39 MIN: ICD-10-PCS | Mod: S$PBB,,, | Performed by: INTERNAL MEDICINE

## 2023-03-06 PROCEDURE — 3074F SYST BP LT 130 MM HG: CPT | Mod: CPTII,,, | Performed by: INTERNAL MEDICINE

## 2023-03-06 PROCEDURE — 1159F PR MEDICATION LIST DOCUMENTED IN MEDICAL RECORD: ICD-10-PCS | Mod: CPTII,,, | Performed by: INTERNAL MEDICINE

## 2023-03-06 PROCEDURE — 99214 OFFICE O/P EST MOD 30 MIN: CPT | Mod: S$PBB,,, | Performed by: INTERNAL MEDICINE

## 2023-03-06 PROCEDURE — 1126F PR PAIN SEVERITY QUANTIFIED, NO PAIN PRESENT: ICD-10-PCS | Mod: CPTII,,, | Performed by: INTERNAL MEDICINE

## 2023-03-06 PROCEDURE — 1126F AMNT PAIN NOTED NONE PRSNT: CPT | Mod: CPTII,,, | Performed by: INTERNAL MEDICINE

## 2023-03-06 PROCEDURE — 3074F PR MOST RECENT SYSTOLIC BLOOD PRESSURE < 130 MM HG: ICD-10-PCS | Mod: CPTII,,, | Performed by: INTERNAL MEDICINE

## 2023-03-06 PROCEDURE — 1101F PT FALLS ASSESS-DOCD LE1/YR: CPT | Mod: CPTII,,, | Performed by: INTERNAL MEDICINE

## 2023-03-06 PROCEDURE — 3288F PR FALLS RISK ASSESSMENT DOCUMENTED: ICD-10-PCS | Mod: CPTII,,, | Performed by: INTERNAL MEDICINE

## 2023-03-06 PROCEDURE — 3078F PR MOST RECENT DIASTOLIC BLOOD PRESSURE < 80 MM HG: ICD-10-PCS | Mod: CPTII,,, | Performed by: INTERNAL MEDICINE

## 2023-03-06 PROCEDURE — 3008F PR BODY MASS INDEX (BMI) DOCUMENTED: ICD-10-PCS | Mod: CPTII,,, | Performed by: INTERNAL MEDICINE

## 2023-03-06 PROCEDURE — 1159F MED LIST DOCD IN RCRD: CPT | Mod: CPTII,,, | Performed by: INTERNAL MEDICINE

## 2023-03-06 PROCEDURE — 3078F DIAST BP <80 MM HG: CPT | Mod: CPTII,,, | Performed by: INTERNAL MEDICINE

## 2023-03-06 PROCEDURE — 3008F BODY MASS INDEX DOCD: CPT | Mod: CPTII,,, | Performed by: INTERNAL MEDICINE

## 2023-03-06 PROCEDURE — 99213 OFFICE O/P EST LOW 20 MIN: CPT | Mod: PBBFAC | Performed by: INTERNAL MEDICINE

## 2023-03-06 RX ORDER — MEGESTROL ACETATE 40 MG/ML
800 SUSPENSION ORAL DAILY
Qty: 300 ML | Refills: 2 | Status: SHIPPED | OUTPATIENT
Start: 2023-03-06 | End: 2023-03-07 | Stop reason: SDUPTHER

## 2023-03-06 NOTE — PROGRESS NOTES
Past Medical History:   Diagnosis Date    Breast cancer     Liver metastases    Past medical history: Obesity.  Tobacco abuse.   -2020: Acute nonocclusive DVT in the right distal femoral vein; acute occlusive DVTs right popliteal and right peroneal veins ((she never followed up on that)  Procedure/surgical history: Cholecystectomy ().  Bilateral tubal ligation (according to her).  Social history: .  Lives in [place], [place].  Has 2 children.  Children.  Does not work.  Smoked half a pack of cigarettes daily for 40 years; quit 2 weeks ago.  No alcohol or illicit drugs.  Family history: No family history of cancer.  Health maintenance: According to her, screening colonoscopy 5 years ago at [facility], probably revealing a benign polyp.  Menstrual and OB/GYN history: Menarche, age 13.  Menopause, age 59.  G2, P2.  No abortions or miscarriages.  Age at delivery of first child, 22.  Did not breast-feed her children.  Used birth control pills for 1 year, subsequently, bilateral tubal ligation.  No history of hormone replacement therapy.  Past Surgical History:   Procedure Laterality Date    BREAST BIOPSY      CHOLECYSTECTOMY        Social History     Socioeconomic History    Marital status:     Number of children: 2   Occupational History    Occupation: retired   Tobacco Use    Smoking status: Former     Packs/day: 0.25     Years: 40.00     Pack years: 10.00     Types: Cigarettes     Quit date: 3/5/2022     Years since quittin.0    Smokeless tobacco: Never   Substance and Sexual Activity    Alcohol use: Not Currently    Drug use: Never    Sexual activity: Yes     Partners: Male      Family History   Problem Relation Age of Onset    Cancer Neg Hx         Reason for Follow-up:  -IDC right breast, axillary lymph node biopsy +, presumed triple negative, AJCC stage IIB, clinical prognostic stage IIIB  -liver metastases; adrenal lesions  -brain metastasis  -History of right lower extremity DVT     -right thyroid lesion       History of Present Illness:   Breast Cancer        Oncologic/Hematologic History:  Oncology History   Breast cancer, right   1/7/2022 Cancer Staged    Staging form: Breast, AJCC 8th Edition  - Clinical stage from 1/7/2022: Stage IIIA (cT2, cN1, cM0, G3, ER+, MT-, HER2-)       4/7/2022 Initial Diagnosis    Breast cancer     4/13/2022 - 6/3/2022 Chemotherapy    Treatment Summary   Plan Name: OP BREAST DOSE-DENSE AC - DOXORUBICIN CYCLOPHOSPHAMIDE Q2W  Treatment Goal: Control  Status: Inactive  Start Date: 4/13/2022  End Date: 6/3/2022  Provider: Gurmeet Hatfield MD  Chemotherapy: DOXOrubicin chemo injection 112 mg, 60 mg/m2 = 112 mg, Intravenous, Clinic/[facility] 1 time, 4 of 4 cycles  Administration: 112 mg (5/5/2022), 112 mg (5/19/2022), 112 mg (6/2/2022)  cyclophosphamide 600 mg/m2 = 1,120 mg in sodium chloride 0.9% 250 mL chemo infusion, 600 mg/m2 = 1,120 mg, Intravenous, Clinic/[facility] 1 time, 4 of 4 cycles  Administration: 1,100 mg (5/5/2022), 1,120 mg (5/19/2022), 1,100 mg (6/2/2022)       8/31/2022 - 10/13/2022 Chemotherapy    Treatment Summary   Plan Name: OP BREAST PACLITAXEL Q2W  Treatment Goal: Control  Status: Inactive  Start Date: 8/31/2022  End Date: 10/13/2022  Provider: Gurmeet Hatfield MD  Chemotherapy: PACLitaxeL (TAXOL) 175 mg/m2 = 312 mg in sodium chloride 0.9% 500 mL chemo infusion, 175 mg/m2 = 312 mg, Intravenous, Clinic/[facility] 1 time, 4 of 4 cycles  Administration: 312 mg (8/31/2022), 312 mg (9/14/2022), 312 mg (9/28/2022), 312 mg (10/12/2022)       2/23/2023 -  Chemotherapy    Treatment Summary   Plan Name: OP ERIBULIN Q3W  Treatment Goal: Palliative  Status: Active  Start Date: 2/23/2023  End Date: 10/14/2023 (Planned)  Provider: Gurmeet Hatfield MD  Chemotherapy: eriBULin (HALAVEN) 1.75 mg in sodium chloride 0.9% 118.5 mL IVPB, 1.1 mg/m2 = 1.75 mg (78.6 % of original dose 1.4 mg/m2), Intravenous, Clinic/[facility] 1 time, 1 of 12 cycles  Dose modification: 1.1 mg/m2 (original  dose 1.4 mg/m2, Cycle 1, Reason: MD Discretion, Comment: Renal dose)  Administration: 1.75 mg (2/23/2023)       Liver metastasis   2/8/2023 Initial Diagnosis    Liver metastasis     2/23/2023 -  Chemotherapy    Treatment Summary   Plan Name: OP ERIBULIN Q3W  Treatment Goal: Palliative  Status: Active  Start Date: 2/23/2023  End Date: 10/14/2023 (Planned)  Provider: Gurmeet Hatfield MD  Chemotherapy: eriBULin (HALAVEN) 1.75 mg in sodium chloride 0.9% 118.5 mL IVPB, 1.1 mg/m2 = 1.75 mg (78.6 % of original dose 1.4 mg/m2), Intravenous, Clinic/[facility] 1 time, 1 of 12 cycles  Dose modification: 1.1 mg/m2 (original dose 1.4 mg/m2, Cycle 1, Reason: MD Discretion, Comment: Renal dose)  Administration: 1.75 mg (2/23/2023)        66-year-old lady referred from surgery, with breast cancer.     She initially presented to emergency department 11/15/2021 for evaluation of breast pain and a palpable lump in the right breast.  Subsequent imaging studies and biopsy established diagnosis of invasive ductal carcinoma of right breast, axillary lymph node positive, ER low positive, NC negative, and HER-2 negative.    Oncologic history:   #IDC right breast, presumed triple negative:   -History of right lower extremity DVT and right breast mass 02/2020; she did not follow-up   -Presentation: 11/2021: Palpable mass   -01/07/2022: Bilateral diagnostic mammogram with contrast and limited ultrasound right breast   -01/07/2022: Biopsy   -3.1 cm mass on mammogram, overall grade 3, right axillary lymph node biopsy positive   -ER low positive (1%).  NC negative (0%).  HER2 equivocal (Score 2+).  HER-2 negative by FISH.  Ki-67 high proliferation (50%)   >>>   For the purpose of neoadjuvant chemotherapy, since ER is low positive, we will treat the patient as if triple negative   >>>  -cT2 cN1 MX, grade 3, presumed triple negative, AJCC anatomic stage IIB, clinical prognostic stage IIIB   -02/22/2022: DEXA scan: Normal BMD   -02/22/2022: CT C/A/P with  contrast for staging: Right breast mass with right axillary lymphadenopathy; bilateral adrenal nodules, statistically representing adenomas   -02/22/2022: Whole-body nuclear medicine bone scan: No bone metastasis   -03/02/2022: Mediport placed   -03/11/2022: TTE: LVEF 55-60%  -neoadjuvant DD AC started 04/13/2022; severe neutropenia, ANC 0.03, on 04/26/2022; therefore, cycle 2 held; treated with growth factor and prophylactic ciprofloxacin  -cycle 2 on 05/05/2022; cycle 3 on 05/19/2022; cycle 4 on 06/02/2022   -some progression on restaging mammogram and ultrasound 06/17/2022  -06/21/2022:  She was supposed to start neoadjuvant dose dense Taxol but was sent to emergency department for evaluation because she was feeling extremely weak, sick, unable to walk for 2-3 weeks  -06/21/2022:  WBC, differential unremarkable.  Hemoglobin 9.2, stable.  Platelets 210 K. ANC 5.6.  CMP stable and within acceptable limits.  Magnesium normal.  BNP normal.  TSH 2.2180, normal.    -06/21/2022:  V/Q scan:  Normal/very low probability of pulmonary embolism  -07/15/2022:  CT C/A/P with contrast (comparison:  02/22/2022):   Mass in the right breast with associated skin thickening in the right breast overall slightly less prominent than the prior examination.  The right axillary lymphadenopathy is also less prominent than the prior examination   Interval development of multiple punctate hypoattenuating areas within the liver concerning for possible developing metastatic foci.  Short-term follow-up is recommended.  These nodules are too small to characterize and are all subcentimeter in size.   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  CA 27.29 level normal.  CA 15-3 level normal.  -neoadjuvant dose dense Taxol started 08/31/2022  -neoadjuvant dose dense Taxol: Cycle 1 (08/31/2022); cycle 2 (09/14/2022)  -09/09/2022: Whole-body nuclear medicine bone scan (comparison:  Bone scan 02/22/2022; CT C/A/P 0 07/15/2022): No bone metastasis  -09/19/2022: PET-CT to rule out metastatic disease (comparison:  09/09/2022 bone scan; 07/15/2022 CT C/A/P):  1. Slight interval enlargement of hypermetabolic right breast mass and associated right axillary node.  2. Metastatic right hepatic lesion (central aspect of right liver, 1.9 x 1.2 cm), with no other definite FDG-avid or aggressive appearing process through the neck, chest, abdomen, or pelvis.  3. Subcentimeter [place] foci are less conspicuous and again of limited characterization due to size and non-contrast protocol, but statistically favored to represent benign cysts.  Close attention on surveillance imaging is needed in order to ensure ongoing stability.  4. Incidental right adrenal adenoma (1.7 x 4.3 cm)  5. Diffuse osseous uptake is favored to reflect sequela of systemic therapeutic agent.  6. Hypermetabolic low-density nodule posterior right hemithyroid, 1.6 x 1.4 cm, maximum SUV 4.6, every -neoadjuvant dose dense Taxol:  Cycle 3 (09/28/2022); cycle 4 (10/12/2022)  -10/17/2022:  Thyroid ultrasound:  1. Multinodular thyroid.  2. The dominant right upper thyroid pole nodule previously demonstrated hypermetabolic activity, warranting biopsy.  3. Dominant central left thyroid nodule meets biopsy criteria.  4. Additional nodules do not meet biopsy criteria secondary to  insufficient size/sonographic characteristics; recommendations for ultrasound surveillance provided above.  -10/21/2022:  MRI abdomen with and without contrast (comparison:  09/19/2022):  1. There are 2 small right hepatic lobe lesions suspicious for metastases.  One of these was hypermetabolic on recent PET-CT.  Right lobe lesion 9 mm, corresponding to hypermetabolic lesion on PET-CT.  More inferiorly in right hepatic lobe 6 mm, this was not evident on PET  2. Bilateral adrenal adenomas show no significant change going back to February 2022.  -11/04/2022: IR:  Liver lesion too small for biopsy  -11/04/2022:  Restaging bilateral diagnostic mammogram with contrast and limited ultrasound right breast (comparison:  06/17/2022):  Right breast mass 3.8 x 2.8 x 3.4 cm, previously 3.2 x 3.1 x 3.6 cm (06/17/2022) and 3 x 3.1 x 2.1 cm) 12/21/2021); right axillary lymph node 2.6 x 1 x 1.4 cm, previously 3.6 x 1.3 x 1.3 cm (06/17/2022) and 3.5 x 0.9 x 1.4 cm (12/21/2021) (right breast malignancy with nipple and skin involvement; compared to 06/17/2022, there has been interval development of at least 2 adjacent satellite lesions upper outer right breast and interval progression in the associated calcifications; total satellite lesions measure 3.6 x 5.0 x 5.3 cm, previously 3.8 x 3.5 x 3 cm on 06/17/2022 and 3.5 by 2.6 x 2.8 cm on 01/07/2022)  -12/08/2022:  Ultrasound-guided biopsy, bilateral (FNA): Pathology:  1. Fine needle aspiration of left thyroid nodule: Unsatisfactory. Insufficient cells for diagnostic evaluation.   2. Fine needle aspiration of right thyroid nodule: Cytomorphology  of a benign thyroid nodule.  -01/09/2023: Restaging CTs C/A/P with contrast (comparison:  PET-CT 09/19/2022, MRI abdomen 09/21/2022):   1. Interval progression of disease  2. Slight interval increase in size of right breast mass (4.6 x 3 cm, previously 4 x 3 cm)  3. Interval increase in size of right axillary lymph node (1.9 cm, previously 1.1  cm)  4. Interval increase in size and number of hepatic metastases (largest mass with satellite nodularity right lobe 7.3 x 7.1 cm, previously 2 cm)  -01/30/2023:  Ultrasound soft tissues of the neck pelvic comparison:  10/17/2022):   The hypermetabolic right thyroid lobe upper pole dominant 1.8 cm diameter isoechoic nodule appear slightly enlarged from prior 1.5 cm.   Grossly stable 1.6 cm diameter cystic and solid left thyroid lobe nodule with partially obscured borders, defer to prior FNA results.  -01/31/2023: Restaging whole-body nuclear medicine bone scan pelvic comparison: CT C/A/P 0 01/09/2023):  No bone metastasis  -02/02/2023:  CT-guided right liver biopsy: Pathology:  Metastatic carcinoma, consistent with a breast primary  -01/23/2023:  CEA 5.10, elevated; CA 27.29 level 49.1, elevated; CA 15-3 level 22, normal        02/10/2022:  Presents for initial medical oncology consultation, accompanied by her .     Interval History:  PORT FLUSH   OP ERIBULIN Q3W   03/06/2023:   -Neupogen 480 mcg subQ (03/02/2023, 03/03/2023, 03/04/2023, 03/05/2023) (on 03/02/2023, ANC 0.468, severe neutropenia; also started on prophylaxis ciprofloxacin)  -03/03/2023:  Brain MRI scan with and without contrast for staging (comparison:  Brain MRI 07/25/2022):   1.  Right cerebellar two new and minimal left temporal lobe medial aspect enhancing lesions are consistent with metastasis (0.6 x 1.2 cm in cerebellum; 1.2 x 0.9 cm in cerebellum; 4 mm in left medial temporal lobe) (no significant vasogenic edema)  2.  Right frontal calvarial small enhancing focus suspected for early metastatic focus (right frontal calvarial subtle 6 mm enhancing focus, may represent minimal early calvarial metastatic focus)  3.  No acute brain infarcts.  -03/03/2023: Brain MRI with brain metastasis, noted; referred to Radiation Oncology ASAP for palliative radiotherapy; emergency room precautions conveyed  Labs reviewed.    Presents for a follow-up  visit.  Accompanied by her daughter.  In a wheelchair.  In no acute discomfort.  Says that she is doing okay.  Appetite is decreased.  We will start Megace.  No unusual headaches, focal neurological symptoms, vision problems, or seizure-like activity.  ECOG 1.      Medications:  Current Outpatient Medications on File Prior to Visit   Medication Sig Dispense Refill    ciprofloxacin HCl (CIPRO) 500 MG tablet Take 1 tablet (500 mg total) by mouth 2 (two) times daily. for 7 days 14 tablet 0    dexAMETHasone (DECADRON) 4 MG Tab Take 5 tablets (20 mg total) by mouth As instructed (for chemotherapy). Take 5 tablets (20 mg total) po 12 hours before treatment and then repeat 6 hours before chemotherapy treatment (Patient not taking: Reported on 11/29/2022) 30 tablet 0    HYDROcodone-acetaminophen (NORCO) 5-325 mg per tablet Take 1 tablet by mouth every 4 (four) hours as needed for Pain. 60 tablet 0    ondansetron (ZOFRAN) 8 MG tablet Take 1 tablet (8 mg total) by mouth every 8 (eight) hours as needed for Nausea. (Patient not taking: Reported on 11/29/2022) 30 tablet 2     No current facility-administered medications on file prior to visit.       Review of Systems:   All systems reviewed and found to be negative except for the symptoms detailed above    Physical Examination:   VITAL SIGNS:   Vitals:    03/06/23 0900   BP: (!) 80/51   Pulse: 105   Resp: 20   Temp: 97.6 °F (36.4 °C)           GENERAL:  In no apparent distress.    HEAD:  No signs of head trauma.  EYES:  Pupils are equal.  Extraocular motions intact.    EARS:  Hearing grossly intact.  MOUTH:  Oropharynx is normal.   NECK:  No adenopathy, no JVD.     CHEST:  Chest with clear breath sounds bilaterally.  No wheezes, rales, rhonchi.    CARDIAC:  Regular rate and rhythm.  S1 and S2, without murmurs, gallops, rubs.  VASCULAR:  No Edema.  Peripheral pulses normal and equal in all extremities.  ABDOMEN:  Soft, without detectable tenderness.  No sign of distention.  No    rebound or guarding, and no masses palpated.   Bowel Sounds normal.  MUSCULOSKELETAL:  Good range of motion of all major joints. Extremities without clubbing, cyanosis or edema.    NEUROLOGIC EXAM:  Alert and oriented x 3.  No focal sensory or strength deficits.   Speech normal.  Follows commands.  PSYCHIATRIC:  Mood normal.  # 03/06/2023: In a wheelchair.  In no acute discomfort.  No focal gross neurological deficit.    No results for input(s): CBC in the last 72 hours.   No results for input(s): CMP in the last 72 hours.     Assessment:  Problem List Items Addressed This Visit          Neuro    Chemotherapy-induced neuropathy       Renal/    Pain of right breast       Hematology    History of deep venous thrombosis (DVT) of distal vein of right lower extremity       Oncology    Breast cancer, right - Primary    Liver metastases    Recurrent breast cancer, right       Endocrine    Adrenal nodule    Right thyroid nodule    Multinodular goiter       Orthopedic    Pain in lower back       Other    Axillary lymphadenopathy       IDC right breast, ER low pos (1%), CO negative (0%), HER2 negative (practically triple negative):  -IDC right breast, high-grade, practically triple negative (ER 1%, low +; CO negative; HER2 2+, equivocal; HER2 negative by FISH)  -right breast mass initially noted 02/2020; she did not follow-up;   -biopsy after 2 years (01/07/2022)  -ER low + (1%).  CO negative (0%).  HER2 negative.  Ki-67 high proliferation (50%).  -3.1 cm mass on mammogram.  Grade 3. Right axillary lymph node biopsy +  -cT2 cN1 MX, grade 3, practically triple negative, AJCC anatomic stage IIB, clinical prognostic stage IIIB  -S/P neoadjuvant ddAC x4 (04/13/2022-06/02/2022), with some progression on restaging mammogram and ultrasound 06/17/2022  -S/P neoadjuvant dose dense Taxol x4 (08/31/2022-10/12/2022)  >> progression of right breast lesion but right axillary lymph node smaller on restaging bilateral diagnostic mammogram  with contrast and limited ultrasound right breast 11/04/2022  >>>  Radiologic progression, with worsening liver metastases:  -suspicion of small liver metastases on PET-CT 09/19/2022 and MRI abdomen 10/21/2022 (1.9 x 1.2 cm on PET-CT but 9 mm on MRI); per IR, too small for biopsy  -01/04/2023: Says that the lump in right breast is back  -disease progression on restaging CTs C/A/P 01/09/2023 (enlargement of right breast mass and right axillary lymph node; increase in size and number of liver metastasis, largest mass 7.3 x 7.1 cm, previously 2 cm)  -01/23/2023: No significant symptoms; ECOG 1  -01/23/2023:  CEA 5.10, elevated; CA 27.29 level 49.1, elevated; CA 15-3 level 22, normal  -no bone metastases on bone scan 01/31/2023  -CT-guided liver biopsy 02/02/2023: Metastatic carcinoma, consistent with breast primary  -liver biopsy 02/02/2023:  No PD-L1 expression for Keytruda ; NGS study nondiagnostic due to inability to [place] 5 DNA/RNA   -progression of metastasis on CTs C/A/P 0 02/17/2023, performed before starting eribulin  -eribulin started 02/23/2023 (dose reduced to 1.1 mg per m2 due to reduced creatinine clearance)  -brain metastases noted on brain MRI 03/03/2023; no vasogenic edema      Sites of disease:  Enlarging right breast mass post neoadjuvant ddAC and Taxol  Right axillary lymphadenopathy  Liver metastases, largest 7.3 x 7.1 cm on CT  -brain metastases noted on brain MRI 03/03/2023; no vasogenic edema      Molecular markers:  # breast biopsy (01/07/2022): ER low + (1%).  DC negative (0%).  HER2 equivocal (2+).  HER2 negative. 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 Multinodular thyroid.  2. The dominant right upper thyroid pole nodule previously demonstrated hypermetabolic activity, warranting biopsy.  3. Dominant central left thyroid nodule meets biopsy criteria.  4. Additional nodules do not meet biopsy criteria secondary to insufficient size/sonographic characteristics; recommendations for ultrasound surveillance provided above.  -12/08/2022:  Ultrasound-guided biopsy, bilateral (FNA): Pathology:  1. Fine needle aspiration of left thyroid nodule: Unsatisfactory. Insufficient cells for diagnostic evaluation.   2. Fine needle aspiration of right thyroid nodule: Cytomorphology  of a benign thyroid nodule.        History of right lower extremity DVT 02/19/2020:  -02/19/2020: (Right leg swelling for 3 days) acute nonocclusive DVT in the right distal femoral vein; acute occlusive DVTs right popliteal and right peroneal veins  -Apparently, at the same time, was also noted to have a breast lump  Which could only be biopsied on 01/07/2022, 2 years later, because of for noncompliance with follow-up (right breast, 3:00, a small lump, nontender, slightly irregular on palpation, not hard or fluctuant, no erythema) (apparently, [facility] ambulatory clinics tried to contact her but without any response )        Plan:  -CT-guided liver biopsy 02/02/2023: Metastatic carcinoma, consistent with breast primary  Metastatic disease, developing after neoadjuvant ddAC followed by neoadjuvant Taxol (completed 10/12/2022)   In this situation, no indication of surgical resection of breast mass  Sites of disease:  Enlarging right breast mass; right axillary lymphadenopathy; increasing # and size of liver metastases, largest 7.3 x 7.1 cm  -liver biopsy 02/02/2023:  No PD-L1 expression for Keytruda ; NGS study nondiagnostic due to inability to amplify DNA/RNA   -progression of metastasis on CTs C/A/P 0 02/17/2023, performed before  starting eribulin  -eribulin started 02/23/2023 (dose reduced to 1.1 mg per m2 due to reduced creatinine clearance)  -brain metastases noted on brain MRI 03/03/2023; no vasogenic edema  >>>  Brain metastasis; has been referred to Radiation Oncology for palliative radiotherapy; corticosteroids not indicated (no vasogenic edema)  Please ask lab to check ER, [place], HER2 status of liver biopsy  Genetic testing is pending; need ASAP  Please order liquid biopsy (NGS testing on liver biopsy specimen was not feasible)  Will avoid Adriamycin, Cytoxan, and Taxol because patient progressed on neoadjuvant ddAC and neoadjuvant Taxol  Need germline BRCA1/2 testing ASAP   Continue eribulin  From cycle 2 of eribulin, will add Neulasta support (because, developed severe neutropenia 7 days after day 1 cycle 1 of eribulin  Check CBC and CMP prior to each dose of eribulin   Check tumor markers including CEA, CA 15-3, and CA 27.29 level every month to assess response (next, around 03/23/2023)   Re-stage with contrast enhanced CT scans of C/A/P 2 months (around 04/23/2023) after starting eribulin (02/23/2023)    Continue Norco for pain in right breast and in the back    03/06/2023: Anorexia; start Megace 800 mg p.o. q.day    Eribulin:  Schedule:  Days 1 and 8: Eribulin 1.4 mg/m² IV push  Repeat cycle every 3 weeks  IV infusion over 2-5 minutes    Warnings/precautions with eribulin:  1.  Bone marrow suppression.  Severe neutropenia and neutropenic fever.  2.  Peripheral neuropathy  3.  QT prolongation    Adverse reactions with eribulin:  >10%  Peripheral edema, fatigue, peripheral neuropathy, headache, alopecia, hypokalemia, hypocalcemia, nausea, constipation, abdominal pain, neutropenia, anemia, increased ALT, increased AST, etc.      Follow-up with NP in 2 weeks   Follow-up with me in 1 month.     Above discussed at length with the patient.  All questions answered.    Discussed labs and scans and gave her copies of relevant records.     Plan of management discussed once again.  Have already explained and reinforced that she has stage IV disease which is incurable but we can attempt palliation with systemic chemotherapy; response can not be guaranteed and long-term prognosis is guarded.    She understands and agrees with this plan.    Follow-up:  No follow-ups on file.

## 2023-03-06 NOTE — Clinical Note
Orders for today:  Cycle 2 of eribulin to start around 03/16/2023 Discontinue Neupogen Refer to Radiation Oncology for brain metastasis Genetic testing ASAP  Ask lab to check ER, [place], HER2 status of liver biopsy  Please order liquid biopsy for NGS testing (NGS testing on liver biopsy was not feasible) Add Neulasta from cycle 2 of eribulin onwards Check CBC and CMP before each dose of eribulin  Check CEA, CA 15-3, and CA 27.29 levels every month; next, around 03/23/2023  Re-stage with contrast enhanced CT scans of C/A/P around 04/23/2023  Continue Norco for right breast and back pain  Follow-up with NP in 2 weeks  Follow-up with me in 1 month.

## 2023-03-07 DIAGNOSIS — C50.911 MALIGNANT NEOPLASM OF RIGHT BREAST IN FEMALE, ESTROGEN RECEPTOR POSITIVE, UNSPECIFIED SITE OF BREAST: ICD-10-CM

## 2023-03-07 DIAGNOSIS — C78.7 LIVER METASTASES: ICD-10-CM

## 2023-03-07 DIAGNOSIS — C50.911 RECURRENT BREAST CANCER, RIGHT: ICD-10-CM

## 2023-03-07 DIAGNOSIS — Z17.0 MALIGNANT NEOPLASM OF RIGHT BREAST IN FEMALE, ESTROGEN RECEPTOR POSITIVE, UNSPECIFIED SITE OF BREAST: ICD-10-CM

## 2023-03-07 RX ORDER — MEGESTROL ACETATE 40 MG/ML
800 SUSPENSION ORAL DAILY
Qty: 300 ML | Refills: 2 | Status: SHIPPED | OUTPATIENT
Start: 2023-03-07 | End: 2023-04-10

## 2023-03-08 ENCOUNTER — OFFICE VISIT (OUTPATIENT)
Dept: RADIATION THERAPY | Facility: HOSPITAL | Age: 68
End: 2023-03-08
Attending: RADIOLOGY
Payer: MEDICAID

## 2023-03-08 ENCOUNTER — TELEPHONE (OUTPATIENT)
Dept: HEMATOLOGY/ONCOLOGY | Facility: CLINIC | Age: 68
End: 2023-03-08
Payer: MEDICAID

## 2023-03-08 DIAGNOSIS — C78.7 LIVER METASTASES: ICD-10-CM

## 2023-03-08 DIAGNOSIS — C50.911 MALIGNANT NEOPLASM OF RIGHT FEMALE BREAST, UNSPECIFIED ESTROGEN RECEPTOR STATUS, UNSPECIFIED SITE OF BREAST: ICD-10-CM

## 2023-03-08 DIAGNOSIS — C79.31 BRAIN METASTASES: Primary | ICD-10-CM

## 2023-03-09 ENCOUNTER — OFFICE VISIT (OUTPATIENT)
Dept: HEMATOLOGY/ONCOLOGY | Facility: CLINIC | Age: 68
End: 2023-03-09
Payer: MEDICAID

## 2023-03-09 ENCOUNTER — INFUSION (OUTPATIENT)
Dept: INFUSION THERAPY | Facility: HOSPITAL | Age: 68
End: 2023-03-09
Attending: INTERNAL MEDICINE
Payer: MEDICAID

## 2023-03-09 ENCOUNTER — HOSPITAL ENCOUNTER (OUTPATIENT)
Dept: RADIOLOGY | Facility: HOSPITAL | Age: 68
Discharge: HOME | End: 2023-03-09
Attending: NURSE PRACTITIONER
Payer: MEDICAID

## 2023-03-09 VITALS
DIASTOLIC BLOOD PRESSURE: 47 MMHG | SYSTOLIC BLOOD PRESSURE: 80 MMHG | BODY MASS INDEX: 22.5 KG/M2 | HEIGHT: 63 IN | WEIGHT: 127 LBS | OXYGEN SATURATION: 99 % | RESPIRATION RATE: 20 BRPM | HEART RATE: 106 BPM | TEMPERATURE: 98 F

## 2023-03-09 DIAGNOSIS — C78.7 LIVER METASTASIS: Primary | ICD-10-CM

## 2023-03-09 DIAGNOSIS — C50.011 MALIGNANT NEOPLASM OF NIPPLE OF RIGHT BREAST IN FEMALE, UNSPECIFIED ESTROGEN RECEPTOR STATUS: ICD-10-CM

## 2023-03-09 DIAGNOSIS — R59.0 AXILLARY LYMPHADENOPATHY: ICD-10-CM

## 2023-03-09 DIAGNOSIS — T82.594A OBSTRUCTION OF CENTRAL LINE, INITIAL ENCOUNTER: ICD-10-CM

## 2023-03-09 DIAGNOSIS — T82.594A OBSTRUCTION OF CENTRAL LINE, INITIAL ENCOUNTER: Primary | ICD-10-CM

## 2023-03-09 LAB
DHEA SERPL-MCNC: NORMAL
ESTRIOL SERPL-MCNC: NORMAL NG/ML
ESTROGEN SERPL-MCNC: NORMAL PG/ML
INSULIN SERPL-ACNC: NORMAL U[IU]/ML
LAB AP CLINICAL INFORMATION: NORMAL
LAB AP GROSS DESCRIPTION: NORMAL
LAB AP REPORT FOOTNOTES: NORMAL
T3RU NFR SERPL: NORMAL %

## 2023-03-09 PROCEDURE — 96375 TX/PRO/DX INJ NEW DRUG ADDON: CPT

## 2023-03-09 PROCEDURE — 1125F AMNT PAIN NOTED PAIN PRSNT: CPT | Mod: CPTII,,, | Performed by: NURSE PRACTITIONER

## 2023-03-09 PROCEDURE — 1125F PR PAIN SEVERITY QUANTIFIED, PAIN PRESENT: ICD-10-PCS | Mod: CPTII,,, | Performed by: NURSE PRACTITIONER

## 2023-03-09 PROCEDURE — 25000003 PHARM REV CODE 250: Performed by: NURSE PRACTITIONER

## 2023-03-09 PROCEDURE — 99214 OFFICE O/P EST MOD 30 MIN: CPT | Mod: PBBFAC,25 | Performed by: NURSE PRACTITIONER

## 2023-03-09 PROCEDURE — 25000003 PHARM REV CODE 250: Performed by: INTERNAL MEDICINE

## 2023-03-09 PROCEDURE — 36593 DECLOT VASCULAR DEVICE: CPT

## 2023-03-09 PROCEDURE — 1159F PR MEDICATION LIST DOCUMENTED IN MEDICAL RECORD: ICD-10-PCS | Mod: CPTII,,, | Performed by: NURSE PRACTITIONER

## 2023-03-09 PROCEDURE — 1160F PR REVIEW ALL MEDS BY PRESCRIBER/CLIN PHARMACIST DOCUMENTED: ICD-10-PCS | Mod: CPTII,,, | Performed by: NURSE PRACTITIONER

## 2023-03-09 PROCEDURE — 1101F PT FALLS ASSESS-DOCD LE1/YR: CPT | Mod: CPTII,,, | Performed by: NURSE PRACTITIONER

## 2023-03-09 PROCEDURE — 3008F PR BODY MASS INDEX (BMI) DOCUMENTED: ICD-10-PCS | Mod: CPTII,,, | Performed by: NURSE PRACTITIONER

## 2023-03-09 PROCEDURE — 3288F FALL RISK ASSESSMENT DOCD: CPT | Mod: CPTII,,, | Performed by: NURSE PRACTITIONER

## 2023-03-09 PROCEDURE — 3078F DIAST BP <80 MM HG: CPT | Mod: CPTII,,, | Performed by: NURSE PRACTITIONER

## 2023-03-09 PROCEDURE — 1159F MED LIST DOCD IN RCRD: CPT | Mod: CPTII,,, | Performed by: NURSE PRACTITIONER

## 2023-03-09 PROCEDURE — 63600175 PHARM REV CODE 636 W HCPCS: Performed by: INTERNAL MEDICINE

## 2023-03-09 PROCEDURE — 1160F RVW MEDS BY RX/DR IN RCRD: CPT | Mod: CPTII,,, | Performed by: NURSE PRACTITIONER

## 2023-03-09 PROCEDURE — 77001 FLUOROGUIDE FOR VEIN DEVICE: CPT | Mod: TC

## 2023-03-09 PROCEDURE — 1101F PR PT FALLS ASSESS DOC 0-1 FALLS W/OUT INJ PAST YR: ICD-10-PCS | Mod: CPTII,,, | Performed by: NURSE PRACTITIONER

## 2023-03-09 PROCEDURE — 3008F BODY MASS INDEX DOCD: CPT | Mod: CPTII,,, | Performed by: NURSE PRACTITIONER

## 2023-03-09 PROCEDURE — 3074F SYST BP LT 130 MM HG: CPT | Mod: CPTII,,, | Performed by: NURSE PRACTITIONER

## 2023-03-09 PROCEDURE — 25500020 PHARM REV CODE 255

## 2023-03-09 PROCEDURE — 96409 CHEMO IV PUSH SNGL DRUG: CPT

## 2023-03-09 PROCEDURE — 99214 OFFICE O/P EST MOD 30 MIN: CPT | Mod: S$PBB,,, | Performed by: NURSE PRACTITIONER

## 2023-03-09 PROCEDURE — 63600175 PHARM REV CODE 636 W HCPCS: Mod: JZ,JG | Performed by: NURSE PRACTITIONER

## 2023-03-09 PROCEDURE — 3074F PR MOST RECENT SYSTOLIC BLOOD PRESSURE < 130 MM HG: ICD-10-PCS | Mod: CPTII,,, | Performed by: NURSE PRACTITIONER

## 2023-03-09 PROCEDURE — 99214 PR OFFICE/OUTPT VISIT, EST, LEVL IV, 30-39 MIN: ICD-10-PCS | Mod: S$PBB,,, | Performed by: NURSE PRACTITIONER

## 2023-03-09 PROCEDURE — 3078F PR MOST RECENT DIASTOLIC BLOOD PRESSURE < 80 MM HG: ICD-10-PCS | Mod: CPTII,,, | Performed by: NURSE PRACTITIONER

## 2023-03-09 PROCEDURE — 3288F PR FALLS RISK ASSESSMENT DOCUMENTED: ICD-10-PCS | Mod: CPTII,,, | Performed by: NURSE PRACTITIONER

## 2023-03-09 RX ORDER — HEPARIN 100 UNIT/ML
500 SYRINGE INTRAVENOUS
Status: DISCONTINUED | OUTPATIENT
Start: 2023-03-09 | End: 2023-03-09 | Stop reason: HOSPADM

## 2023-03-09 RX ORDER — ONDANSETRON 2 MG/ML
8 INJECTION INTRAMUSCULAR; INTRAVENOUS
Status: COMPLETED | OUTPATIENT
Start: 2023-03-09 | End: 2023-03-09

## 2023-03-09 RX ORDER — SODIUM CHLORIDE 0.9 % (FLUSH) 0.9 %
10 SYRINGE (ML) INJECTION
Status: DISCONTINUED | OUTPATIENT
Start: 2023-03-09 | End: 2023-03-09 | Stop reason: HOSPADM

## 2023-03-09 RX ADMIN — IOPAMIDOL 15 ML: 612 INJECTION, SOLUTION INTRAVENOUS at 02:03

## 2023-03-09 RX ADMIN — ERIBULIN MESYLATE 1.75 MG: 0.5 INJECTION INTRAVENOUS at 01:03

## 2023-03-09 RX ADMIN — HEPARIN 500 UNITS: 100 SYRINGE at 03:03

## 2023-03-09 RX ADMIN — SODIUM CHLORIDE: 9 INJECTION, SOLUTION INTRAVENOUS at 01:03

## 2023-03-09 RX ADMIN — ONDANSETRON 8 MG: 2 INJECTION INTRAMUSCULAR; INTRAVENOUS at 01:03

## 2023-03-09 RX ADMIN — ALTEPLASE 2 MG: 2.2 INJECTION, POWDER, LYOPHILIZED, FOR SOLUTION INTRAVENOUS at 11:03

## 2023-03-09 NOTE — NURSING
1455 - Received telephone readback verbal order from Dr Alcantara, who come from [facility], to perform flow study, that port was patent and  ok to use as final report is pending.

## 2023-03-09 NOTE — NURSING
No blood return from port; Cathflo given at 1120; no blood return after q30 minute checks x4; notified A VEDA Marsh who will order a flow study today; pt informed of same & verbalized understanding.

## 2023-03-09 NOTE — PROGRESS NOTES
Reason for Follow-up:  -IDC right breast, axillary lymph node biopsy +, presumed triple negative, AJCC stage IIB, clinical prognostic stage IIIB  -liver metastases; adrenal lesions  -brain metastasis  -History of right lower extremity DVT    -right thyroid lesion     Current Treatment:  -eribulin   started 02/23/2023 (dose reduced to 1.1 mg per m2 due to reduced creatinine clearance)    Treatment History:  -03/02/2022: Mediport placed  -neoadjuvant DD AC (4/13/2022-6/2/2022)  -S/P neoadjuvant dose dense Taxol x4 (08/31/2022-10/12/2022)    Past medical history: Obesity.  Tobacco abuse.   -02/19/2020: Acute nonocclusive DVT in the right distal femoral vein; acute occlusive DVTs right popliteal and right peroneal veins ((she never followed up on that)  Procedure/surgical history: Cholecystectomy (2001).  Bilateral tubal ligation (according to her).  Social history: .  Lives in [place], [place].  Has 2 children.  Children.  Does not work.  Smoked half a pack of cigarettes daily for 40 years; quit 2 weeks ago.  No alcohol or illicit drugs.  Family history: No family history of cancer.  Health maintenance: According to her, screening colonoscopy 5 years ago at [facility], probably revealing a benign polyp.  Menstrual and OB/GYN history: Menarche, age 13.  Menopause, age 59.  G2, P2.  No abortions or miscarriages.  Age at delivery of first child, 22.  Did not breast-feed her children.  Used birth control pills for 1 year, subsequently, bilateral tubal ligation.  No history of hormone replacement therapy.    History of Present Illness:    66-year-old lady referred from surgery, with breast cancer.     She initially presented to emergency department 11/15/2021 for evaluation of breast pain and a palpable lump in the right breast.  Subsequent imaging studies and biopsy established diagnosis of invasive ductal carcinoma of right breast, axillary lymph node positive, ER low positive, NJ negative, and HER-2  negative.    Oncologic history:   #IDC right breast, presumed triple negative:   -History of right lower extremity DVT and right breast mass 02/2020; she did not follow-up   -Presentation: 11/2021: Palpable mass   -01/07/2022: Bilateral diagnostic mammogram with contrast and limited ultrasound right breast   -01/07/2022: Biopsy   -3.1 cm mass on mammogram, overall grade 3, right axillary lymph node biopsy positive   -ER low positive (1%).  GA negative (0%).  HER2 equivocal (Score 2+).  HER-2 negative by FISH.  Ki-67 high proliferation (50%)   >>>   For the purpose of neoadjuvant chemotherapy, since ER is low positive, we will treat the patient as if triple negative   >>>  -cT2 cN1 MX, grade 3, presumed triple negative, AJCC anatomic stage IIB, clinical prognostic stage IIIB   -02/22/2022: DEXA scan: Normal BMD   -02/22/2022: CT C/A/P with contrast for staging: Right breast mass with right axillary lymphadenopathy; bilateral adrenal nodules, statistically representing adenomas   -02/22/2022: Whole-body nuclear medicine bone scan: No bone metastasis   -03/02/2022: Mediport placed   -03/11/2022: TTE: LVEF 55-60%  -neoadjuvant DD AC started 04/13/2022; severe neutropenia, ANC 0.03, on 04/26/2022; therefore, cycle 2 held; treated with growth factor and prophylactic ciprofloxacin  -cycle 2 on 05/05/2022; cycle 3 on 05/19/2022; cycle 4 on 06/02/2022   -some progression on restaging mammogram and ultrasound 06/17/2022  -06/21/2022:  She was supposed to start neoadjuvant dose dense Taxol but was sent to emergency department for evaluation because she was feeling extremely weak, sick, unable to walk for 2-3 weeks  -06/21/2022:  WBC, differential unremarkable.  Hemoglobin 9.2, stable.  Platelets 210 K. ANC 5.6.  CMP stable and within acceptable limits.  Magnesium normal.  BNP normal.  TSH 2.2180, normal.    -06/21/2022:  V/Q scan:  Normal/very low probability of pulmonary embolism  -07/15/2022:  CT C/A/P with contrast  (comparison:  02/22/2022):   Mass in the right breast with associated skin thickening in the right breast overall slightly less prominent than the prior examination.  The right axillary lymphadenopathy is also less prominent than the prior examination   Interval development of multiple punctate hypoattenuating areas within the liver concerning for possible developing metastatic foci.  Short-term follow-up is recommended.  These nodules are too small to characterize and are all subcentimeter in size.   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  CA 27.29 level normal.  CA 15-3 level normal.  -neoadjuvant dose dense Taxol started 08/31/2022  -neoadjuvant dose dense Taxol: Cycle 1 (08/31/2022); cycle 2 (09/14/2022)  -09/09/2022: Whole-body nuclear medicine bone scan (comparison:  Bone scan 02/22/2022; CT C/A/P 0 07/15/2022): No bone metastasis  -09/19/2022: PET-CT to rule out metastatic disease (comparison:  09/09/2022 bone scan; 07/15/2022 CT C/A/P):  1. Slight interval enlargement of hypermetabolic right breast mass and associated right axillary node.  2. Metastatic right hepatic lesion (central aspect of right liver, 1.9 x 1.2 cm), with no other definite FDG-avid or aggressive appearing process through the neck, chest, abdomen, or pelvis.  3. Subcentimeter [place] foci are less  conspicuous and again of limited characterization due to size and non-contrast protocol, but statistically favored to represent benign cysts.  Close attention on surveillance imaging is needed in order to ensure ongoing stability.  4. Incidental right adrenal adenoma (1.7 x 4.3 cm)  5. Diffuse osseous uptake is favored to reflect sequela of systemic therapeutic agent.  6. Hypermetabolic low-density nodule posterior right hemithyroid, 1.6 x 1.4 cm, maximum SUV 4.6, every -neoadjuvant dose dense Taxol:  Cycle 3 (09/28/2022); cycle 4 (10/12/2022)  -10/17/2022:  Thyroid ultrasound:  1. Multinodular thyroid.  2. The dominant right upper thyroid pole nodule previously demonstrated hypermetabolic activity, warranting biopsy.  3. Dominant central left thyroid nodule meets biopsy criteria.  4. Additional nodules do not meet biopsy criteria secondary to insufficient size/sonographic characteristics; recommendations for ultrasound surveillance provided above.  -10/21/2022:  MRI abdomen with and without contrast (comparison:  09/19/2022):  1. There are 2 small right hepatic lobe lesions suspicious for metastases.  One of these was hypermetabolic on recent PET-CT.  Right lobe lesion 9 mm, corresponding to hypermetabolic lesion on PET-CT.  More inferiorly in right hepatic lobe 6 mm, this was not evident on PET  2. Bilateral adrenal adenomas show no significant change going back to February 2022.  -11/04/2022: IR:  Liver lesion too small for biopsy  -11/04/2022:  Restaging bilateral diagnostic mammogram with contrast and limited ultrasound right breast (comparison:  06/17/2022):  Right breast mass 3.8 x 2.8 x 3.4 cm, previously 3.2 x 3.1 x 3.6 cm (06/17/2022) and 3 x 3.1 x 2.1 cm) 12/21/2021); right axillary lymph node 2.6 x 1 x 1.4 cm, previously 3.6 x 1.3 x 1.3 cm (06/17/2022) and 3.5 x 0.9 x 1.4 cm (12/21/2021) (right breast malignancy with nipple and skin involvement; compared to 06/17/2022, there has been interval development  of at least 2 adjacent satellite lesions upper outer right breast and interval progression in the associated calcifications; total satellite lesions measure 3.6 x 5.0 x 5.3 cm, previously 3.8 x 3.5 x 3 cm on 06/17/2022 and 3.5 by 2.6 x 2.8 cm on 01/07/2022)  -12/08/2022:  Ultrasound-guided biopsy, bilateral (FNA): Pathology:  1. Fine needle aspiration of left thyroid nodule: Unsatisfactory. Insufficient cells for diagnostic evaluation.   2. Fine needle aspiration of right thyroid nodule: Cytomorphology  of a benign thyroid nodule.  -01/09/2023: Restaging CTs C/A/P with contrast (comparison:  PET-CT 09/19/2022, MRI abdomen 09/21/2022):   1. Interval progression of disease  2. Slight interval increase in size of right breast mass (4.6 x 3 cm, previously 4 x 3 cm)  3. Interval increase in size of right axillary lymph node (1.9 cm, previously 1.1 cm)  4. Interval increase in size and number of hepatic metastases (largest mass with satellite nodularity right lobe 7.3 x 7.1 cm, previously 2 cm)  -01/30/2023:  Ultrasound soft tissues of the neck pelvic comparison:  10/17/2022):   The hypermetabolic right thyroid lobe upper pole dominant 1.8 cm diameter isoechoic nodule appear slightly enlarged from prior 1.5 cm.   Grossly stable 1.6 cm diameter cystic and solid left thyroid lobe nodule with partially obscured borders, defer to prior FNA results.  -01/31/2023: Restaging whole-body nuclear medicine bone scan pelvic comparison: CT C/A/P 0 01/09/2023):  No bone metastasis  -02/02/2023:  CT-guided right liver biopsy: Pathology:  Metastatic carcinoma, consistent with a breast primary  -01/23/2023:  CEA 5.10, elevated; CA 27.29 level 49.1, elevated; CA 15-3 level 22, normal     Interval History 3/9/2023: Patient presents today along with her daughter for scheduled follow up for breast cancer. She is due to receive cycle 1 day 8 of [place] today. Patient was held last week due to neutropenia. She reports feeling well today. She  reports chronic stable symptoms of weakness, fatigue, SOB, diarrhea, abdominal pain and numbness and tingling. Patient says she has been consulted by radiation and plans are to start next week sometime. She is aware of upcoming MRI. She denies any confusion, unusual headaches, seizure activity, vision changes, neurological changes, new or worsening lumps/masses, chest pain, blood in urine/stool, fever or signs of infection. She also reports decreased appetite and would like refill on Megace. Lab work reviewed with patient, CrCl 44.2 low discussed dose reduce of Halaven with patient, plt count low 88. Discussed bleeding precautions with patient. Discussed follow up appointments with patient.       Review of Systems:   All systems reviewed and found to be negative except for the symptoms detailed above    Lab Results   Component Value Date    WBC 8.5 03/09/2023    RBC 3.11 (L) 03/09/2023    HGB 10.3 (L) 03/09/2023    HCT 31.8 (L) 03/09/2023    .3 (H) 03/09/2023    MCH 33.1 03/09/2023    MCHC 32.4 (L) 03/09/2023    RDW 18.2 (H) 03/09/2023    PLT 88 (L) 03/09/2023    MPV 10.3 03/09/2023     CMP  Sodium Level   Date Value Ref Range Status   03/09/2023 139 136 - 145 mmol/L Final     Potassium Level   Date Value Ref Range Status   03/09/2023 3.9 3.5 - 5.1 mmol/L Final     Carbon Dioxide   Date Value Ref Range Status   03/09/2023 27 23 - 31 mmol/L Final     Blood Urea Nitrogen   Date Value Ref Range Status   03/09/2023 11.6 9.8 - 20.1 mg/dL Final     Creatinine   Date Value Ref Range Status   03/09/2023 1.05 (H) 0.55 - 1.02 mg/dL Final     Calcium Level Total   Date Value Ref Range Status   03/09/2023 9.0 8.4 - 10.2 mg/dL Final     Albumin Level   Date Value Ref Range Status   03/09/2023 2.6 (L) 3.4 - 4.8 g/dL Final     Bilirubin Total   Date Value Ref Range Status   03/09/2023 1.3 <=1.5 mg/dL Final     Alkaline Phosphatase   Date Value Ref Range Status   03/09/2023 265 (H) 40 - 150 unit/L Final     Aspartate  Aminotransferase   Date Value Ref Range Status   03/09/2023 68 (H) 5 - 34 unit/L Final     Alanine Aminotransferase   Date Value Ref Range Status   03/09/2023 12 0 - 55 unit/L Final     eGFR   Date Value Ref Range Status   03/09/2023 59 mls/min/1.73/m2 Final       Physical Examination:   VITAL SIGNS:   Vitals:    03/09/23 0942   BP: (!) 80/47   Pulse: 106   Resp: 20   Temp: 97.7 °F (36.5 °C)     General: Alert and oriented. NAD, in wheelchair   Eye: Pupils are equal, round and reactive to light, Extraocular movements are intact. Normal conjunctiva  HENT: Normocephalic. Oropharynx exam deferred; mask in place due to coronavirus  Neck: Supple, Non-tender  Respiratory: Respirations are non-labored, Symmetrical chest wall expansion. Breath sounds CTA bilaterally  Cardiovascular: Regular rate, rhythm, Normal peripheral perfusion, No bilateral lower extremity edema  Gastrointestinal: Non-distended, Present bowel sounds   Genitourinary: Exam deferred  Lymphatics: No lymphadenopathy appreciated  Musculoskeletal: Moves all extremities  Integumentary: Intact. Warm, dry. No rashes, or lesions to visible skin  Neurologic: No focal deficits  Psychiatric: Cooperative. Appropriate mood and affect   ECOG Performance Scale: 2 - Capable of all self-care but unable to carry out any work activities. Up and about greater than 50 percent of waking hours.       Assessment:  IDC right breast, ER low pos (1%), IN negative (0%), HER2 negative (practically triple negative):  -IDC right breast, high-grade, practically triple negative (ER 1%, low +; IN negative; HER2 2+, equivocal; HER2 negative by FISH)  -right breast mass initially noted 02/2020; she did not follow-up;   -biopsy after 2 years (01/07/2022)  -ER low + (1%).  IN negative (0%).  HER2 negative.  Ki-67 high proliferation (50%).  -3.1 cm mass on mammogram.  Grade 3. Right axillary lymph node biopsy +  -cT2 cN1 MX, grade 3, practically triple negative, AJCC anatomic stage IIB,  clinical prognostic stage IIIB  -S/P neoadjuvant ddAC x4 (04/13/2022-06/02/2022), with some progression on restaging mammogram and ultrasound 06/17/2022  -S/P neoadjuvant dose dense Taxol x4 (08/31/2022-10/12/2022)  >> progression of right breast lesion but right axillary lymph node smaller on restaging bilateral diagnostic mammogram with contrast and limited ultrasound right breast 11/04/2022  >>>  Radiologic progression, with worsening liver metastases:  -suspicion of small liver metastases on PET-CT 09/19/2022 and MRI abdomen 10/21/2022 (1.9 x 1.2 cm on PET-CT but 9 mm on MRI); per IR, too small for biopsy  -01/04/2023: Says that the lump in right breast is back  -disease progression on restaging CTs C/A/P 01/09/2023 (enlargement of right breast mass and right axillary lymph node; increase in size and number of liver metastasis, largest mass 7.3 x 7.1 cm, previously 2 cm)  -01/23/2023: No significant symptoms; ECOG 1  -01/23/2023:  CEA 5.10, elevated; CA 27.29 level 49.1, elevated; CA 15-3 level 22, normal  -no bone metastases on bone scan 01/31/2023  -CT-guided liver biopsy 02/02/2023: Metastatic carcinoma, consistent with breast primary  -liver biopsy 02/02/2023:  No PD-L1 expression for Keytruda ; NGS study nondiagnostic due to inability to [place] 5 DNA/RNA   -progression of metastasis on CTs C/A/P 0 02/17/2023, performed before starting eribulin  -eribulin started 02/23/2023 (dose reduced to 1.1 mg per m2 due to reduced creatinine clearance)  -brain metastases noted on brain MRI 03/03/2023; no vasogenic edema    Sites of disease:  Enlarging right breast mass post neoadjuvant ddAC and Taxol  Right axillary lymphadenopathy  Liver metastases, largest 7.3 x 7.1 cm on CT  -brain metastases noted on brain MRI 03/03/2023; no vasogenic edema    Molecular markers:  # breast biopsy (01/07/2022): ER low + (1%).  TN negative (0%).  HER2 equivocal (2+).  HER2 negative. 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 Multinodular thyroid.  2. The dominant right upper thyroid pole nodule previously demonstrated hypermetabolic activity, warranting biopsy.  3. Dominant central left thyroid nodule meets biopsy criteria.  4. Additional nodules do not meet biopsy criteria secondary to insufficient size/sonographic characteristics; recommendations for ultrasound surveillance provided above.  -12/08/2022:  Ultrasound-guided biopsy, bilateral (FNA): Pathology:  1. Fine needle aspiration of left thyroid nodule: Unsatisfactory. Insufficient cells for diagnostic evaluation.   2. Fine needle aspiration of right thyroid nodule: Cytomorphology  of a benign thyroid nodule.        History of right lower extremity DVT 02/19/2020:  -02/19/2020: (Right leg swelling for 3 days) acute nonocclusive DVT in the right distal femoral vein; acute occlusive DVTs right popliteal and right peroneal veins  -Apparently, at the same time, was also noted to have a breast lump  Which could only be biopsied on 01/07/2022, 2 years later, because of for noncompliance with follow-up (right breast, 3:00, a small lump, nontender, slightly irregular on palpation, not hard or fluctuant, no erythema) (apparently, [facility] ambulatory clinics tried to contact her but without any response )        Plan:  -CT-guided liver biopsy 02/02/2023: Metastatic carcinoma, consistent with breast primary  Metastatic disease, developing after neoadjuvant ddAC followed by neoadjuvant Taxol (completed 10/12/2022)   In this situation, no indication of surgical resection of breast mass  Sites of  disease:  Enlarging right breast mass; right axillary lymphadenopathy; increasing # and size of liver metastases, largest 7.3 x 7.1 cm  -liver biopsy 02/02/2023:  No PD-L1 expression for Keytruda ; NGS study nondiagnostic due to inability to amplify DNA/RNA   -progression of metastasis on CTs C/A/P 0 02/17/2023, performed before starting eribulin  -eribulin started 02/23/2023 (dose reduced to 1.1 mg per m2 due to reduced creatinine clearance)  -brain metastases noted on brain MRI 03/03/2023; no vasogenic edema  >>>    will add Neulasta support (because, developed severe neutropenia 7 days after day 1 cycle 1 of eribulin  Brain metastasis; has been referred to Radiation Oncology for palliative radiotherapy; corticosteroids not indicated (no vasogenic edema)  Please ask lab to check ER, [place], HER2 status of liver biopsy-pending  Genetic testing is pending; need ASAP  Please order liquid biopsy (NGS testing on liver biopsy specimen was not feasible)-pending  Will avoid Adriamycin, Cytoxan, and Taxol because patient progressed on neoadjuvant ddAC and neoadjuvant Taxol  Need germline BRCA1/2 testing ASAP-pending        CrCl 44.2; therefore decrease Eribulin dose to 1.1mg/m2; Okay to proceed with cycle 1 day 8 with lower dose today, Return on day 9 for Neulasta support   From cycle 2 of eribulin, will add Neulasta support (because, developed severe neutropenia 7 days after day 1 cycle 1 of eribulin  Follow up with  in 3 weeks with lab work and to review MRI results  Check CBC and CMP prior to each dose of eribulin   Check tumor markers including CEA, CA 15-3, and CA 27.29 level every month to assess response (next, around 03/23/2023)   Re-stage with contrast enhanced CT scans of C/A/P 2 months (around 04/23/2023) after starting eribulin (02/23/2023)-Scheduled for 4/24/2023  Increase hydration with water    Continue Norco for pain in right breast and in the back    03/06/2023: Anorexia; start Megace 800 mg p.o.  q.day    Eribulin:  Schedule:  Days 1 and 8: Eribulin 1.4 mg/m² IV push  Repeat cycle every 3 weeks  IV infusion over 2-5 minutes    Warnings/precautions with eribulin:  1.  Bone marrow suppression.  Severe neutropenia and neutropenic fever.  2.  Peripheral neuropathy  3.  QT prolongation    Adverse reactions with eribulin:  >10%  Peripheral edema, fatigue, peripheral neuropathy, headache, alopecia, hypokalemia, hypocalcemia, nausea, constipation, abdominal pain, neutropenia, anemia, increased ALT, increased AST, etc.      Above discussed at length with the patient.  All questions answered.    Plan of management discussed once again.  Have already explained and reinforced that she has stage IV disease which is incurable but we can attempt palliation with systemic chemotherapy; response can not be guaranteed and long-term prognosis is guarded.    She understands and agrees with this plan.

## 2023-03-10 ENCOUNTER — INFUSION (OUTPATIENT)
Dept: INFUSION THERAPY | Facility: HOSPITAL | Age: 68
End: 2023-03-10
Attending: INTERNAL MEDICINE
Payer: MEDICAID

## 2023-03-10 VITALS — DIASTOLIC BLOOD PRESSURE: 70 MMHG | SYSTOLIC BLOOD PRESSURE: 110 MMHG

## 2023-03-10 DIAGNOSIS — C50.011 MALIGNANT NEOPLASM OF NIPPLE OF RIGHT BREAST IN FEMALE, UNSPECIFIED ESTROGEN RECEPTOR STATUS: ICD-10-CM

## 2023-03-10 DIAGNOSIS — C78.7 LIVER METASTASIS: Primary | ICD-10-CM

## 2023-03-10 DIAGNOSIS — C50.911 RECURRENT BREAST CANCER, RIGHT: ICD-10-CM

## 2023-03-10 DIAGNOSIS — C50.911 MALIGNANT NEOPLASM OF RIGHT FEMALE BREAST, UNSPECIFIED ESTROGEN RECEPTOR STATUS, UNSPECIFIED SITE OF BREAST: ICD-10-CM

## 2023-03-10 DIAGNOSIS — R59.0 AXILLARY LYMPHADENOPATHY: ICD-10-CM

## 2023-03-10 PROCEDURE — 63600175 PHARM REV CODE 636 W HCPCS: Mod: JZ,TB,JG | Performed by: INTERNAL MEDICINE

## 2023-03-10 PROCEDURE — 96360 HYDRATION IV INFUSION INIT: CPT

## 2023-03-10 PROCEDURE — 25000003 PHARM REV CODE 250: Performed by: INTERNAL MEDICINE

## 2023-03-10 PROCEDURE — 96372 THER/PROPH/DIAG INJ SC/IM: CPT | Mod: 59

## 2023-03-10 RX ORDER — SODIUM CHLORIDE 0.9 % (FLUSH) 0.9 %
10 SYRINGE (ML) INJECTION
Status: CANCELLED | OUTPATIENT
Start: 2023-03-10

## 2023-03-10 RX ORDER — SODIUM CHLORIDE 0.9 % (FLUSH) 0.9 %
10 SYRINGE (ML) INJECTION
Status: DISCONTINUED | OUTPATIENT
Start: 2023-03-10 | End: 2023-03-10 | Stop reason: HOSPADM

## 2023-03-10 RX ORDER — HEPARIN 100 UNIT/ML
500 SYRINGE INTRAVENOUS
Status: DISCONTINUED | OUTPATIENT
Start: 2023-03-10 | End: 2023-03-10 | Stop reason: HOSPADM

## 2023-03-10 RX ORDER — HEPARIN 100 UNIT/ML
500 SYRINGE INTRAVENOUS
Status: CANCELLED | OUTPATIENT
Start: 2023-03-10

## 2023-03-10 RX ADMIN — HEPARIN 500 UNITS: 100 SYRINGE at 01:03

## 2023-03-10 RX ADMIN — SODIUM CHLORIDE 1000 ML: 9 INJECTION, SOLUTION INTRAVENOUS at 12:03

## 2023-03-10 RX ADMIN — PEGFILGRASTIM 6 MG: 6 INJECTION SUBCUTANEOUS at 12:03

## 2023-03-10 NOTE — NURSING
Pt very weak, walked from car with walker; pt in tears; spoke with Dr Hatfield who gave a verbal order to give 1L NS today.

## 2023-03-13 ENCOUNTER — OFFICE VISIT (OUTPATIENT)
Dept: HEMATOLOGY/ONCOLOGY | Facility: CLINIC | Age: 68
End: 2023-03-13
Payer: MEDICAID

## 2023-03-13 DIAGNOSIS — C50.911 MALIGNANT NEOPLASM OF RIGHT BREAST IN FEMALE, ESTROGEN RECEPTOR POSITIVE, UNSPECIFIED SITE OF BREAST: Primary | ICD-10-CM

## 2023-03-13 DIAGNOSIS — Z17.0 MALIGNANT NEOPLASM OF RIGHT BREAST IN FEMALE, ESTROGEN RECEPTOR POSITIVE, UNSPECIFIED SITE OF BREAST: Primary | ICD-10-CM

## 2023-03-13 PROCEDURE — 99213 OFFICE O/P EST LOW 20 MIN: CPT | Mod: 95,,, | Performed by: NURSE PRACTITIONER

## 2023-03-13 PROCEDURE — 99213 PR OFFICE/OUTPT VISIT, EST, LEVL III, 20-29 MIN: ICD-10-PCS | Mod: 95,,, | Performed by: NURSE PRACTITIONER

## 2023-03-13 NOTE — PROGRESS NOTES
REFERRING PHYSICIAN: Dr. Gurmeet Hatfield    Subjective:       Patient ID: Asia Gallagher is a 67 y.o. female.    Chief Complaint: Personal history of breast cancer (right IDC triple negative) originally diagnosed 66y, with metastatic disease diagnosed 67y. Dr. Mckenzie has requested genetic testing to assist with treatment decision-making. Patient presented for genetic counseling on 1/30/2023 and was found appropriate for genetic testing based on the National Comprehensive Cancer Network (NCCN) criteria due to a personal history of metastatic breast cancer whereas genetic test result could influence treatment. She signed consent, lab was drawn and sent to [facility] for BRCA1/2 Analysis with [facility] panel testing. She and her  Kaveh Gallagher presented via Telemedicine Visit (audio only) today for results disclosure.     HPI  Past Medical History:   Diagnosis Date    Breast cancer     Liver metastases       Review of patient's allergies indicates:  No Known Allergies   Review of Systems      Assessment:       Problem List Items Addressed This Visit    None    Oncology History   Breast cancer, right   1/7/2022 Cancer Staged    Staging form: Breast, AJCC 8th Edition  - Clinical stage from 1/7/2022: Stage IIIA (cT2, cN1, cM0, G3, ER+, RI-, HER2-)       4/7/2022 Initial Diagnosis    Breast cancer     4/13/2022 - 6/3/2022 Chemotherapy    Treatment Summary   Plan Name: OP BREAST DOSE-DENSE AC - DOXORUBICIN CYCLOPHOSPHAMIDE Q2W  Treatment Goal: Control  Status: Inactive  Start Date: 4/13/2022  End Date: 6/3/2022  Provider: Gurmeet Hatfield MD  Chemotherapy: DOXOrubicin chemo injection 112 mg, 60 mg/m2 = 112 mg, Intravenous, Clinic/HOD 1 time, 4 of 4 cycles  Administration: 112 mg (5/5/2022), 112 mg (5/19/2022), 112 mg (6/2/2022)  cyclophosphamide 600 mg/m2 = 1,120 mg in sodium chloride 0.9% 250 mL chemo infusion, 600 mg/m2 = 1,120 mg, Intravenous, Clinic/HOD 1 time, 4 of 4 cycles  Administration: 1,100 mg  (5/5/2022), 1,120 mg (5/19/2022), 1,100 mg (6/2/2022)       8/31/2022 - 10/13/2022 Chemotherapy    Treatment Summary   Plan Name: OP BREAST PACLITAXEL Q2W  Treatment Goal: Control  Status: Inactive  Start Date: 8/31/2022  End Date: 10/13/2022  Provider: Gurmeet Hatfield MD  Chemotherapy: PACLitaxeL (TAXOL) 175 mg/m2 = 312 mg in sodium chloride 0.9% 500 mL chemo infusion, 175 mg/m2 = 312 mg, Intravenous, Clinic/HOD 1 time, 4 of 4 cycles  Administration: 312 mg (8/31/2022), 312 mg (9/14/2022), 312 mg (9/28/2022), 312 mg (10/12/2022)       2/23/2023 -  Chemotherapy    Treatment Summary   Plan Name: OP ERIBULIN Q3W  Treatment Goal: Palliative  Status: Active  Start Date: 2/23/2023  End Date: 10/15/2023 (Planned)  Provider: Gurmeet Hatfield MD  Chemotherapy: eriBULin ([place]) 1.75 mg in sodium chloride 0.9% 118.5 mL IVPB, 1.1 mg/m2 = 1.75 mg (78.6 % of original dose 1.4 mg/m2), Intravenous, Clinic/HOD 1 time, 1 of 12 cycles  Dose modification: 1.1 mg/m2 (original dose 1.4 mg/m2, Cycle 1, Reason: MD Discretion, Comment: Renal dose), 1.1 mg/m2 (original dose 1.4 mg/m2, Cycle 1, Reason: MD Discretion)  Administration: 1.75 mg (2/23/2023), 1.75 mg (3/9/2023)       Liver metastasis   2/8/2023 Initial Diagnosis    Liver metastasis     2/23/2023 -  Chemotherapy    Treatment Summary   Plan Name: OP ERIBULIN Q3W  Treatment Goal: Palliative  Status: Active  Start Date: 2/23/2023  End Date: 10/15/2023 (Planned)  Provider: Gurmeet Hatfield MD  Chemotherapy: eriBULin (HALAVEN) 1.75 mg in sodium chloride 0.9% 118.5 mL IVPB, 1.1 mg/m2 = 1.75 mg (78.6 % of original dose 1.4 mg/m2), Intravenous, Clinic/HOD 1 time, 1 of 12 cycles  Dose modification: 1.1 mg/m2 (original dose 1.4 mg/m2, Cycle 1, Reason: MD Discretion, Comment: Renal dose), 1.1 mg/m2 (original dose 1.4 mg/m2, Cycle 1, Reason: MD Discretion)  Administration: 1.75 mg (2/23/2023), 1.75 mg (3/9/2023)              Plan:     Genetic testing was appropriate for this patient  because of her family history. 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 Two (2) MUTYH mutations (monozygous) are associated with MUTYH-Associated Polyposis (MAP). 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 The heterozygous germline MUTYH mutation [id number] is predicted to result in the substitution of aspartic acid for glycine at amino acid position 396 of the MUTYH protein ([id number]). This mutation is a common  mutation and is associated with increased cancer risk and should be regarded as clinically significant. Carriers of a single copy of this mutation (monoallelic) are at increased colon cancer lifetime risk (3.4% to 10%) compared to the general population risk of 3.4%.       Currently there are no guidelines for the medical management of carriers of a single MUTYH mutation. Therefore, screening should be based on on family history.  It would be reasonable to begin colonoscopy at 40y, with repeat at least every 5 years.  There are data to suggest that aspirin may decrease the risk of colon cancer in [place], however, at this time the data are not sufficiently robust to make a recommendation for its standard use.     First degree relatives of individuals with an MUTYH mutation have a 50% risk of having this same mutation. Parents who both have an MUTYH mutation have a 25% risk of having a child with MAP. Family members can be tested with specific site analysis.    A copy of her result and a letter to help inform family members will be emailed to: [email or web address]     The patient location is: home      Visit type: audio only    Time with patient: 10 minutes  20 minutes of total time spent on the encounter, which includes face to face time and non-face to face time preparing to see the patient (eg, review of tests), Obtaining and/or reviewing separately obtained history, Documenting clinical information in  the electronic or other health record, Independently interpreting results (not separately reported) and communicating results to the patient/family/caregiver, or Care coordination (not separately reported).         Each patient to whom he or she provides medical services by telemedicine is:  (1) informed of the relationship between the physician and patient and the respective role of any other health care provider with respect to management of the patient; and (2) notified that he or she may decline to receive medical services by telemedicine and may withdraw from such care at any time.    SONALI VALDERRAMA, PhD

## 2023-03-23 ENCOUNTER — DOCUMENTATION ONLY (OUTPATIENT)
Dept: INFUSION THERAPY | Facility: HOSPITAL | Age: 68
End: 2023-03-23
Payer: MEDICAID

## 2023-03-23 PROCEDURE — 77334 RADIATION TREATMENT AID(S): CPT | Performed by: RADIOLOGY

## 2023-03-23 PROCEDURE — 77470 SPECIAL RADIATION TREATMENT: CPT | Performed by: RADIOLOGY

## 2023-03-23 NOTE — PROGRESS NOTES
3/23/23 Pt was a no show; called patient to see reason for No Show   She was unaware of her infusion appt today and was currently at [facility] for an MRI and was then going to Radiation for treatment    Will keep appt next week on 3/30 that will be C2D1 [place]

## 2023-03-29 ENCOUNTER — TELEPHONE (OUTPATIENT)
Dept: HEMATOLOGY/ONCOLOGY | Facility: CLINIC | Age: 68
End: 2023-03-29
Payer: MEDICAID

## 2023-03-29 PROBLEM — C79.31 BRAIN METASTASIS: Status: ACTIVE | Noted: 2023-03-29

## 2023-03-30 ENCOUNTER — OFFICE VISIT (OUTPATIENT)
Dept: HEMATOLOGY/ONCOLOGY | Facility: CLINIC | Age: 68
End: 2023-03-30
Attending: INTERNAL MEDICINE
Payer: MEDICAID

## 2023-03-30 ENCOUNTER — INFUSION (OUTPATIENT)
Dept: INFUSION THERAPY | Facility: HOSPITAL | Age: 68
End: 2023-03-30
Attending: INTERNAL MEDICINE
Payer: MEDICAID

## 2023-03-30 VITALS
RESPIRATION RATE: 20 BRPM | SYSTOLIC BLOOD PRESSURE: 109 MMHG | HEART RATE: 89 BPM | BODY MASS INDEX: 21.09 KG/M2 | HEIGHT: 63 IN | DIASTOLIC BLOOD PRESSURE: 71 MMHG | TEMPERATURE: 98 F | WEIGHT: 119 LBS | OXYGEN SATURATION: 99 %

## 2023-03-30 DIAGNOSIS — C79.31 BRAIN METASTASIS: Primary | ICD-10-CM

## 2023-03-30 DIAGNOSIS — C50.011 MALIGNANT NEOPLASM OF NIPPLE OF RIGHT BREAST IN FEMALE, UNSPECIFIED ESTROGEN RECEPTOR STATUS: ICD-10-CM

## 2023-03-30 DIAGNOSIS — C50.911 MALIGNANT NEOPLASM OF RIGHT FEMALE BREAST, UNSPECIFIED ESTROGEN RECEPTOR STATUS, UNSPECIFIED SITE OF BREAST: ICD-10-CM

## 2023-03-30 DIAGNOSIS — C78.7 LIVER METASTASES: ICD-10-CM

## 2023-03-30 DIAGNOSIS — R59.0 AXILLARY LYMPHADENOPATHY: ICD-10-CM

## 2023-03-30 DIAGNOSIS — C79.31 BRAIN METASTASIS: ICD-10-CM

## 2023-03-30 DIAGNOSIS — C50.911 RECURRENT BREAST CANCER, RIGHT: ICD-10-CM

## 2023-03-30 DIAGNOSIS — G62.0 CHEMOTHERAPY-INDUCED NEUROPATHY: ICD-10-CM

## 2023-03-30 DIAGNOSIS — E04.2 MULTINODULAR GOITER: ICD-10-CM

## 2023-03-30 DIAGNOSIS — T45.1X5A CHEMOTHERAPY-INDUCED NEUROPATHY: ICD-10-CM

## 2023-03-30 DIAGNOSIS — E27.8 ADRENAL NODULE: ICD-10-CM

## 2023-03-30 DIAGNOSIS — I82.5Z1 CHRONIC DEEP VEIN THROMBOSIS (DVT) OF DISTAL VEIN OF RIGHT LOWER EXTREMITY: ICD-10-CM

## 2023-03-30 DIAGNOSIS — C78.7 LIVER METASTASIS: Primary | ICD-10-CM

## 2023-03-30 DIAGNOSIS — C50.911 MALIGNANT NEOPLASM OF RIGHT FEMALE BREAST, UNSPECIFIED ESTROGEN RECEPTOR STATUS, UNSPECIFIED SITE OF BREAST: Primary | ICD-10-CM

## 2023-03-30 PROCEDURE — 3288F PR FALLS RISK ASSESSMENT DOCUMENTED: ICD-10-PCS | Mod: CPTII,,, | Performed by: INTERNAL MEDICINE

## 2023-03-30 PROCEDURE — 3078F DIAST BP <80 MM HG: CPT | Mod: CPTII,,, | Performed by: INTERNAL MEDICINE

## 2023-03-30 PROCEDURE — 3078F PR MOST RECENT DIASTOLIC BLOOD PRESSURE < 80 MM HG: ICD-10-PCS | Mod: CPTII,,, | Performed by: INTERNAL MEDICINE

## 2023-03-30 PROCEDURE — 1160F RVW MEDS BY RX/DR IN RCRD: CPT | Mod: CPTII,,, | Performed by: INTERNAL MEDICINE

## 2023-03-30 PROCEDURE — 3074F PR MOST RECENT SYSTOLIC BLOOD PRESSURE < 130 MM HG: ICD-10-PCS | Mod: CPTII,,, | Performed by: INTERNAL MEDICINE

## 2023-03-30 PROCEDURE — 1101F PT FALLS ASSESS-DOCD LE1/YR: CPT | Mod: CPTII,,, | Performed by: INTERNAL MEDICINE

## 2023-03-30 PROCEDURE — 99213 OFFICE O/P EST LOW 20 MIN: CPT | Mod: PBBFAC,25 | Performed by: INTERNAL MEDICINE

## 2023-03-30 PROCEDURE — 96409 CHEMO IV PUSH SNGL DRUG: CPT

## 2023-03-30 PROCEDURE — 1160F PR REVIEW ALL MEDS BY PRESCRIBER/CLIN PHARMACIST DOCUMENTED: ICD-10-PCS | Mod: CPTII,,, | Performed by: INTERNAL MEDICINE

## 2023-03-30 PROCEDURE — 1126F PR PAIN SEVERITY QUANTIFIED, NO PAIN PRESENT: ICD-10-PCS | Mod: CPTII,,, | Performed by: INTERNAL MEDICINE

## 2023-03-30 PROCEDURE — 25000003 PHARM REV CODE 250: Performed by: INTERNAL MEDICINE

## 2023-03-30 PROCEDURE — 3074F SYST BP LT 130 MM HG: CPT | Mod: CPTII,,, | Performed by: INTERNAL MEDICINE

## 2023-03-30 PROCEDURE — 3288F FALL RISK ASSESSMENT DOCD: CPT | Mod: CPTII,,, | Performed by: INTERNAL MEDICINE

## 2023-03-30 PROCEDURE — 99215 OFFICE O/P EST HI 40 MIN: CPT | Mod: S$PBB,,, | Performed by: INTERNAL MEDICINE

## 2023-03-30 PROCEDURE — 1159F MED LIST DOCD IN RCRD: CPT | Mod: CPTII,,, | Performed by: INTERNAL MEDICINE

## 2023-03-30 PROCEDURE — 1126F AMNT PAIN NOTED NONE PRSNT: CPT | Mod: CPTII,,, | Performed by: INTERNAL MEDICINE

## 2023-03-30 PROCEDURE — 3008F PR BODY MASS INDEX (BMI) DOCUMENTED: ICD-10-PCS | Mod: CPTII,,, | Performed by: INTERNAL MEDICINE

## 2023-03-30 PROCEDURE — 63600175 PHARM REV CODE 636 W HCPCS: Mod: JZ,JG | Performed by: INTERNAL MEDICINE

## 2023-03-30 PROCEDURE — 1101F PR PT FALLS ASSESS DOC 0-1 FALLS W/OUT INJ PAST YR: ICD-10-PCS | Mod: CPTII,,, | Performed by: INTERNAL MEDICINE

## 2023-03-30 PROCEDURE — 3008F BODY MASS INDEX DOCD: CPT | Mod: CPTII,,, | Performed by: INTERNAL MEDICINE

## 2023-03-30 PROCEDURE — 1159F PR MEDICATION LIST DOCUMENTED IN MEDICAL RECORD: ICD-10-PCS | Mod: CPTII,,, | Performed by: INTERNAL MEDICINE

## 2023-03-30 PROCEDURE — 96375 TX/PRO/DX INJ NEW DRUG ADDON: CPT

## 2023-03-30 PROCEDURE — 99215 PR OFFICE/OUTPT VISIT, EST, LEVL V, 40-54 MIN: ICD-10-PCS | Mod: S$PBB,,, | Performed by: INTERNAL MEDICINE

## 2023-03-30 RX ORDER — SODIUM CHLORIDE 0.9 % (FLUSH) 0.9 %
10 SYRINGE (ML) INJECTION
Status: DISCONTINUED | OUTPATIENT
Start: 2023-03-30 | End: 2023-03-30 | Stop reason: HOSPADM

## 2023-03-30 RX ORDER — HEPARIN 100 UNIT/ML
500 SYRINGE INTRAVENOUS
Status: DISCONTINUED | OUTPATIENT
Start: 2023-03-30 | End: 2023-03-30 | Stop reason: HOSPADM

## 2023-03-30 RX ORDER — ONDANSETRON 2 MG/ML
8 INJECTION INTRAMUSCULAR; INTRAVENOUS
Status: COMPLETED | OUTPATIENT
Start: 2023-03-30 | End: 2023-03-30

## 2023-03-30 RX ADMIN — ONDANSETRON 8 MG: 2 INJECTION INTRAMUSCULAR; INTRAVENOUS at 10:03

## 2023-03-30 RX ADMIN — ERIBULIN MESYLATE 1.75 MG: 0.5 INJECTION INTRAVENOUS at 11:03

## 2023-03-30 NOTE — PROGRESS NOTES
Past Medical History:   Diagnosis Date    Breast cancer     Liver metastases    Past medical history: Obesity.  Tobacco abuse.   -2020: Acute nonocclusive DVT in the right distal femoral vein; acute occlusive DVTs right popliteal and right peroneal veins ((she never followed up on that)  Procedure/surgical history: Cholecystectomy ().  Bilateral tubal ligation (according to her).  Social history: .  Lives in [place], [place].  Has 2 children.  Children.  Does not work.  Smoked half a pack of cigarettes daily for 40 years; quit 2 weeks ago.  No alcohol or illicit drugs.  Family history: No family history of cancer.  Health maintenance: According to her, screening colonoscopy 5 years ago at [facility], probably revealing a benign polyp.  Menstrual and OB/GYN history: Menarche, age 13.  Menopause, age 59.  G2, P2.  No abortions or miscarriages.  Age at delivery of first child, 22.  Did not breast-feed her children.  Used birth control pills for 1 year, subsequently, bilateral tubal ligation.  No history of hormone replacement therapy.  Past Surgical History:   Procedure Laterality Date    BREAST BIOPSY      CHOLECYSTECTOMY        Social History     Socioeconomic History    Marital status:     Number of children: 2   Occupational History    Occupation: retired   Tobacco Use    Smoking status: Former     Packs/day: 0.25     Years: 40.00     Pack years: 10.00     Types: Cigarettes     Quit date: 3/5/2022     Years since quittin.0    Smokeless tobacco: Never   Substance and Sexual Activity    Alcohol use: Not Currently    Drug use: Never    Sexual activity: Yes     Partners: Male      Family History   Problem Relation Age of Onset    Cancer Neg Hx         Reason for Follow-up:  -IDC right breast, axillary lymph node biopsy +, presumed triple negative, AJCC stage IIB, clinical prognostic stage IIIB  -liver metastases; adrenal lesions  -brain metastasis  -History of right lower extremity DVT     -right thyroid lesion       History of Present Illness:   Breast Cancer        Oncologic/Hematologic History:  Oncology History   Breast cancer, right   1/7/2022 Cancer Staged    Staging form: Breast, AJCC 8th Edition  - Clinical stage from 1/7/2022: Stage IIIA (cT2, cN1, cM0, G3, ER+, WI-, HER2-)       4/7/2022 Initial Diagnosis    Breast cancer     4/13/2022 - 6/3/2022 Chemotherapy    Treatment Summary   Plan Name: OP BREAST DOSE-DENSE AC - DOXORUBICIN CYCLOPHOSPHAMIDE Q2W  Treatment Goal: Control  Status: Inactive  Start Date: 4/13/2022  End Date: 6/3/2022  Provider: Gurmeet Hatfield MD  Chemotherapy: DOXOrubicin chemo injection 112 mg, 60 mg/m2 = 112 mg, Intravenous, Clinic/HOD 1 time, 4 of 4 cycles  Administration: 112 mg (5/5/2022), 112 mg (5/19/2022), 112 mg (6/2/2022)  cyclophosphamide 600 mg/m2 = 1,120 mg in sodium chloride 0.9% 250 mL chemo infusion, 600 mg/m2 = 1,120 mg, Intravenous, Clinic/HOD 1 time, 4 of 4 cycles  Administration: 1,100 mg (5/5/2022), 1,120 mg (5/19/2022), 1,100 mg (6/2/2022)       8/31/2022 - 10/13/2022 Chemotherapy    Treatment Summary   Plan Name: OP BREAST PACLITAXEL Q2W  Treatment Goal: Control  Status: Inactive  Start Date: 8/31/2022  End Date: 10/13/2022  Provider: Gurmeet Hatfield MD  Chemotherapy: PACLitaxeL (TAXOL) 175 mg/m2 = 312 mg in sodium chloride 0.9% 500 mL chemo infusion, 175 mg/m2 = 312 mg, Intravenous, Clinic/HOD 1 time, 4 of 4 cycles  Administration: 312 mg (8/31/2022), 312 mg (9/14/2022), 312 mg (9/28/2022), 312 mg (10/12/2022)       2/23/2023 -  Chemotherapy    Treatment Summary   Plan Name: OP ERIBULIN Q3W  Treatment Goal: Palliative  Status: Active  Start Date: 2/23/2023  End Date: 10/15/2023 (Planned)  Provider: Gurmeet Hatfield MD  Chemotherapy: eriBULin (HALAVEN) 1.75 mg in sodium chloride 0.9% 118.5 mL IVPB, 1.1 mg/m2 = 1.75 mg (78.6 % of original dose 1.4 mg/m2), Intravenous, Clinic[facility] 1 time, 1 of 12 cycles  Dose modification: 1.1 mg/m2 (original  dose 1.4 mg/m2, Cycle 1, Reason: MD Discretion, Comment: Renal dose), 1.1 mg/m2 (original dose 1.4 mg/m2, Cycle 1, Reason: MD Discretion)  Administration: 1.75 mg (2/23/2023), 1.75 mg (3/9/2023)       Liver metastasis   2/8/2023 Initial Diagnosis    Liver metastasis     2/23/2023 -  Chemotherapy    Treatment Summary   Plan Name: OP ERIBULIN Q3W  Treatment Goal: Palliative  Status: Active  Start Date: 2/23/2023  End Date: 10/15/2023 (Planned)  Provider: Gurmeet Hatfield MD  Chemotherapy: eriBULin (HALAVEN) 1.75 mg in sodium chloride 0.9% 118.5 mL IVPB, 1.1 mg/m2 = 1.75 mg (78.6 % of original dose 1.4 mg/m2), Intravenous, Clinic/HOD 1 time, 1 of 12 cycles  Dose modification: 1.1 mg/m2 (original dose 1.4 mg/m2, Cycle 1, Reason: MD Discretion, Comment: Renal dose), 1.1 mg/m2 (original dose 1.4 mg/m2, Cycle 1, Reason: MD Discretion)  Administration: 1.75 mg (2/23/2023), 1.75 mg (3/9/2023)        66-year-old lady referred from surgery, with breast cancer.     She initially presented to emergency department 11/15/2021 for evaluation of breast pain and a palpable lump in the right breast.  Subsequent imaging studies and biopsy established diagnosis of invasive ductal carcinoma of right breast, axillary lymph node positive, ER low positive, IA negative, and HER-2 negative.    Oncologic history:   #IDC right breast, presumed triple negative:   -History of right lower extremity DVT and right breast mass 02/2020; she did not follow-up   -Presentation: 11/2021: Palpable mass   -01/07/2022: Bilateral diagnostic mammogram with contrast and limited ultrasound right breast   -01/07/2022: Biopsy   -3.1 cm mass on mammogram, overall grade 3, right axillary lymph node biopsy positive   -ER low positive (1%).  IA negative (0%).  HER2 equivocal (Score 2+).  HER-2 negative by FISH.  Ki-67 high proliferation (50%)   >>>   For the purpose of neoadjuvant chemotherapy, since ER is low positive, we will treat the patient as if triple  negative   >>>  -cT2 cN1 MX, grade 3, presumed triple negative, AJCC anatomic stage IIB, clinical prognostic stage IIIB   -02/22/2022: DEXA scan: Normal BMD   -02/22/2022: CT C/A/P with contrast for staging: Right breast mass with right axillary lymphadenopathy; bilateral adrenal nodules, statistically representing adenomas   -02/22/2022: Whole-body nuclear medicine bone scan: No bone metastasis   -03/02/2022: Mediport placed   -03/11/2022: TTE: LVEF 55-60%  -neoadjuvant DD AC started 04/13/2022; severe neutropenia, ANC 0.03, on 04/26/2022; therefore, cycle 2 held; treated with growth factor and prophylactic ciprofloxacin  -cycle 2 on 05/05/2022; cycle 3 on 05/19/2022; cycle 4 on 06/02/2022   -some progression on restaging mammogram and ultrasound 06/17/2022  -06/21/2022:  She was supposed to start neoadjuvant dose dense Taxol but was sent to emergency department for evaluation because she was feeling extremely weak, sick, unable to walk for 2-3 weeks  -06/21/2022:  WBC, differential unremarkable.  Hemoglobin 9.2, stable.  Platelets 210 K. ANC 5.6.  CMP stable and within acceptable limits.  Magnesium normal.  BNP normal.  TSH 2.2180, normal.    -06/21/2022:  V/Q scan:  Normal/very low probability of pulmonary embolism  -07/15/2022:  CT C/A/P with contrast (comparison:  02/22/2022):   Mass in the right breast with associated skin thickening in the right breast overall slightly less prominent than the prior examination.  The right axillary lymphadenopathy is also less prominent than the prior examination   Interval development of multiple punctate hypoattenuating areas within the liver concerning for possible developing metastatic foci.  Short-term follow-up is recommended.  These nodules are too small to characterize and are all subcentimeter in size.   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  CA 27.29 level normal.  CA 15-3 level normal.  -neoadjuvant dose dense Taxol started 08/31/2022  -neoadjuvant dose dense Taxol: Cycle 1 (08/31/2022); cycle 2 (09/14/2022)  -09/09/2022: Whole-body nuclear medicine bone scan (comparison:  Bone scan 02/22/2022; CT C/A/P 0 07/15/2022): No bone metastasis  -09/19/2022: PET-CT to rule out metastatic disease (comparison:  09/09/2022 bone scan; 07/15/2022 CT C/A/P):  1. Slight interval enlargement of hypermetabolic right breast mass and associated right axillary node.  2. Metastatic right hepatic lesion (central aspect of right liver, 1.9 x 1.2 cm), with no other definite FDG-avid or aggressive appearing process through the neck, chest, abdomen, or pelvis.  3. Subcentimeter [place] foci are less conspicuous and again of limited characterization due to size and non-contrast protocol, but statistically favored to represent benign cysts.  Close attention on surveillance imaging is needed in order to ensure ongoing stability.  4. Incidental right adrenal adenoma (1.7 x 4.3 cm)  5. Diffuse osseous uptake is favored to reflect sequela of systemic therapeutic agent.  6. Hypermetabolic low-density nodule posterior right hemithyroid, 1.6 x 1.4 cm, maximum SUV 4.6, every -neoadjuvant dose dense Taxol:  Cycle 3 (09/28/2022); cycle 4 (10/12/2022)  -10/17/2022:  Thyroid ultrasound:  1. Multinodular thyroid.  2. The dominant right upper thyroid pole nodule previously  demonstrated hypermetabolic activity, warranting biopsy.  3. Dominant central left thyroid nodule meets biopsy criteria.  4. Additional nodules do not meet biopsy criteria secondary to insufficient size/sonographic characteristics; recommendations for ultrasound surveillance provided above.  -10/21/2022:  MRI abdomen with and without contrast (comparison:  09/19/2022):  1. There are 2 small right hepatic lobe lesions suspicious for metastases.  One of these was hypermetabolic on recent PET-CT.  Right lobe lesion 9 mm, corresponding to hypermetabolic lesion on PET-CT.  More inferiorly in right hepatic lobe 6 mm, this was not evident on PET  2. Bilateral adrenal adenomas show no significant change going back to February 2022.  -11/04/2022: IR:  Liver lesion too small for biopsy  -11/04/2022:  Restaging bilateral diagnostic mammogram with contrast and limited ultrasound right breast (comparison:  06/17/2022):  Right breast mass 3.8 x 2.8 x 3.4 cm, previously 3.2 x 3.1 x 3.6 cm (06/17/2022) and 3 x 3.1 x 2.1 cm) 12/21/2021); right axillary lymph node 2.6 x 1 x 1.4 cm, previously 3.6 x 1.3 x 1.3 cm (06/17/2022) and 3.5 x 0.9 x 1.4 cm (12/21/2021) (right breast malignancy with nipple and skin involvement; compared to 06/17/2022, there has been interval development of at least 2 adjacent satellite lesions upper outer right breast and interval progression in the associated calcifications; total satellite lesions measure 3.6 x 5.0 x 5.3 cm, previously 3.8 x 3.5 x 3 cm on 06/17/2022 and 3.5 by 2.6 x 2.8 cm on 01/07/2022)  -12/08/2022:  Ultrasound-guided biopsy, bilateral (FNA): Pathology:  1. Fine needle aspiration of left thyroid nodule: Unsatisfactory. Insufficient cells for diagnostic evaluation.   2. Fine needle aspiration of right thyroid nodule: Cytomorphology  of a benign thyroid nodule.  -01/09/2023: Restaging CTs C/A/P with contrast (comparison:  PET-CT 09/19/2022, MRI abdomen 09/21/2022):   1. Interval progression  of disease  2. Slight interval increase in size of right breast mass (4.6 x 3 cm, previously 4 x 3 cm)  3. Interval increase in size of right axillary lymph node (1.9 cm, previously 1.1 cm)  4. Interval increase in size and number of hepatic metastases (largest mass with satellite nodularity right lobe 7.3 x 7.1 cm, previously 2 cm)  -01/30/2023:  Ultrasound soft tissues of the neck pelvic comparison:  10/17/2022):   The hypermetabolic right thyroid lobe upper pole dominant 1.8 cm diameter isoechoic nodule appear slightly enlarged from prior 1.5 cm.   Grossly stable 1.6 cm diameter cystic and solid left thyroid lobe nodule with partially obscured borders, defer to prior FNA results.  -01/31/2023: Restaging whole-body nuclear medicine bone scan pelvic comparison: CT C/A/P 0 01/09/2023):  No bone metastasis  -02/02/2023:  CT-guided right liver biopsy: Pathology:  Metastatic carcinoma, consistent with a breast primary  -01/23/2023:  CEA 5.10, elevated; CA 27.29 level 49.1, elevated; CA 15-3 level 22, normal        02/10/2022:  Presents for initial medical oncology consultation, accompanied by her .     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    Presents for a follow-up visit.  In no acute discomfort.  Tolerating chemotherapy quite well.  Self-limiting, mild-to-moderate, tolerable side effects.  Some generalized weakness, fatigue, and numbness and tingling in hands and feet.  Appetite is improving.  She looks  and feels a lot better.  Appetite has improved.  ECOG 1.  From what she describes, it seems that she has not had radiation therapy to brain metastasis as yet; it seems that she has been simulated.  Tells me that she will go back to Radiation Oncology next week.  No unusual headaches or focal neurological symptoms.  No vision problems or seizures.    Medications:  Current Outpatient Medications on File Prior to Visit   Medication Sig Dispense Refill    dexAMETHasone (DECADRON) 4 MG Tab Take 5 tablets (20 mg total) by mouth As instructed (for chemotherapy). Take 5 tablets (20 mg total) po 12 hours before treatment and then repeat 6 hours before chemotherapy treatment (Patient not taking: Reported on 11/29/2022) 30 tablet 0    HYDROcodone-acetaminophen (NORCO) 5-325 mg per tablet Take 1 tablet by mouth every 4 (four) hours as needed for Pain. 60 tablet 0    megestroL (MEGACE) 800 mg/20 mL (20 mL) Susp Take 10 mLs (800 mg total) by mouth once daily. 300 mL 2    ondansetron (ZOFRAN) 8 MG tablet Take 1 tablet (8 mg total) by mouth every 8 (eight) hours as needed for Nausea. (Patient not taking: Reported on 11/29/2022) 30 tablet 2     No current facility-administered medications on file prior to visit.       Review of Systems:   All systems reviewed and found to be negative except for the symptoms detailed above    Physical Examination:   VITAL SIGNS:   Vitals:    03/30/23 0925   BP: 109/71   Pulse: 89   Resp: 20   Temp: 98.1 °F (36.7 °C)       GENERAL:  In no apparent distress.    HEAD:  No signs of head trauma.  EYES:  Pupils are equal.  Extraocular motions intact.    EARS:  Hearing grossly intact.  MOUTH:  Oropharynx is normal.   NECK:  No adenopathy, no JVD.     CHEST:  Chest with clear breath sounds bilaterally.  No wheezes, rales, rhonchi.    CARDIAC:  Regular rate and rhythm.  S1 and S2, without murmurs, gallops, rubs.  VASCULAR:  No Edema.  Peripheral pulses normal and equal in all extremities.  ABDOMEN:  Soft,  without detectable tenderness.  No sign of distention.  No   rebound or guarding, and no masses palpated.   Bowel Sounds normal.  MUSCULOSKELETAL:  Good range of motion of all major joints. Extremities without clubbing, cyanosis or edema.    NEUROLOGIC EXAM:  Alert and oriented x 3.  No focal sensory or strength deficits.   Speech normal.  Follows commands.  PSYCHIATRIC:  Mood normal.  # 03/06/2023: In a wheelchair.  In no acute discomfort.  No focal gross neurological deficit.    No results for input(s): CBC in the last 72 hours.   No results for input(s): CMP in the last 72 hours.     Assessment:  Problem List Items Addressed This Visit          Neuro    Chemotherapy-induced neuropathy       Hematology    Chronic deep vein thrombosis (DVT) of distal vein of right lower extremity       Oncology    Breast cancer, right - Primary    Liver metastases    Recurrent breast cancer, right    Brain metastasis       Endocrine    Adrenal nodule    Multinodular goiter       Other    Axillary lymphadenopathy     IDC right breast, ER low pos (1%), [place] negative (0%), HER2 negative (practically triple negative):  -IDC right breast, high-grade, practically triple negative (ER 1%, low +; OH negative; HER2 2+, equivocal; HER2 negative by FISH)  -right breast mass initially noted 02/2020; she did not follow-up;   -biopsy after 2 years (01/07/2022)  -ER low + (1%).  OH negative (0%).  HER2 negative.  Ki-67 high proliferation (50%).  -3.1 cm mass on mammogram.  Grade 3. Right axillary lymph node biopsy +  -cT2 cN1 MX, grade 3, practically triple negative, AJCC anatomic stage IIB, clinical prognostic stage IIIB  -S/P neoadjuvant ddAC x4 (04/13/2022-06/02/2022), with some progression on restaging mammogram and ultrasound 06/17/2022  -S/P neoadjuvant dose dense Taxol x4 (08/31/2022-10/12/2022)  >> progression of right breast lesion but right axillary lymph node smaller on restaging bilateral diagnostic mammogram with contrast and limited  ultrasound right breast 11/04/2022  >>>  Radiologic progression post neoadjuvant chemotherapy, with worsening liver metastases (triple negative):  -suspicion of small liver metastases on PET-CT 09/19/2022 and MRI abdomen 10/21/2022 (1.9 x 1.2 cm on PET-CT but 9 mm on MRI); per IR, too small for biopsy  -01/04/2023: Says that the lump in right breast is back  -disease progression on restaging CTs C/A/P 01/09/2023 (enlargement of right breast mass and right axillary lymph node; increase in size and number of liver metastasis, largest mass 7.3 x 7.1 cm, previously 2 cm)  -01/23/2023: No significant symptoms; ECOG 1  -01/23/2023:  CEA 5.10, elevated; CA 27.29 level 49.1, elevated; CA 15-3 level 22, normal  -no bone metastases on bone scan 01/31/2023  -CT-guided liver biopsy 02/02/2023: Metastatic carcinoma, consistent with breast primary; ER negative (0%); [place] negative (0%); HER2 negative (0%) Ki-67 borderline proliferation (18%)  -liver biopsy 02/02/2023:  No PD-L1 expression for Keytruda ; NGS study nondiagnostic due to inability to [place] 5 DNA/RNA   -progression of metastasis on CTs C/A/P 0 02/17/2023, performed before starting eribulin  -eribulin started 02/23/2023 (dose reduced to 1.1 mg per m2 due to reduced creatinine clearance)  -brain metastases noted on brain MRI 03/03/2023; no vasogenic edema  -01/30/2023:  BRCA1/2 analysis with [facility]:  Carrier: 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   CO negative (0%).  HER2 equivocal (2+).  HER2 negative. 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 Multinodular thyroid.  2. The dominant right upper thyroid pole nodule previously demonstrated hypermetabolic activity, warranting biopsy.  3. Dominant central left thyroid nodule meets biopsy criteria.  4. Additional nodules do not meet biopsy criteria secondary to insufficient size/sonographic characteristics; recommendations for ultrasound surveillance provided above.  -12/08/2022:  Ultrasound-guided biopsy, bilateral (FNA): Pathology:  1. Fine needle aspiration of left thyroid nodule: Unsatisfactory. Insufficient cells for diagnostic evaluation.   2. Fine needle aspiration of right thyroid nodule: Cytomorphology  of a benign thyroid nodule.        History of right lower extremity DVT 02/19/2020:  -02/19/2020: (Right leg swelling for 3 days) acute nonocclusive DVT in the right distal femoral vein; acute occlusive DVTs right popliteal and right peroneal veins  -Apparently, at the same time, was also noted to have a breast lump  Which could only be biopsied on 01/07/2022, 2 years later, because of for noncompliance with follow-up (right breast, 3:00, a small lump, nontender,  slightly irregular on palpation, not hard or fluctuant, no erythema) (apparently, [facility] ambulatory clinics tried to contact her but without any response )        Plan:  -CT-guided liver biopsy 02/02/2023: Metastatic carcinoma, consistent with breast primary; ER negative (0%); TN negative (0%); HER2 negative (0%) Ki-67 borderline proliferation (18%)  Metastatic disease, developing after neoadjuvant ddAC followed by neoadjuvant Taxol (completed 10/12/2022)   In this situation, no indication of surgical resection of breast mass  Sites of disease:  Enlarging right breast mass; right axillary lymphadenopathy; increasing # and size of liver metastases, largest 7.3 x 7.1 cm  -liver biopsy 02/02/2023:  No PD-L1 expression for Keytruda ; NGS study nondiagnostic due to inability to amplify DNA/RNA   -progression of metastasis on CTs C/A/P 0 02/17/2023, performed before starting eribulin  -eribulin started 02/23/2023 (dose reduced to 1.1 mg per m2 due to reduced creatinine clearance)  -brain metastases noted on brain MRI 03/03/2023; no vasogenic edema  -01/30/2023:  BRCA1/2 analysis with [facility]:  Carrier: 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)  -eribulin cycle 1 day 8 (03/09/2023)  -cycle 2 is overdue  -03/09/2023: CEA level 17.78, declining; CA 27.29 level 134, declining; CA 15-3 level 65, declining  -pending: Liquid biopsy  >>>  Liver metastases are triple negative (ER negative, TN negative, HER2 negative) (HER2 0%)  Liver biopsy (02/02/2023) study:  No PD-L1 expression for Keytruda based on both CPS (< 1%) and TPS (<1)  Therefore, not a candidate for treatment with pembrolizumab/chemotherapy  Since PD-L1 CPS <10 (< 1%), and since germline BRCA1/2 mutation negative, therefore, recommendation is to treat with chemotherapy  In second-line, sacituzumab govitecan (category 1, Preferred), or systemic chemotherapy  HER2 negative (0%), therefore, not a candidate for Fam-trastuzumab  deruxtecan  Triple negative status of liver metastases was known only after we started the patient on eribulin  Brain metastasis; has been referred to Radiation Oncology for palliative radiotherapy; corticosteroids not indicated (no vasogenic edema)  Genetic testing essentially negative  Please order liquid biopsy (NGS testing on liver biopsy specimen was not feasible): Pending  Will avoid Adriamycin, Cytoxan, and Taxol because patient progressed on neoadjuvant ddAC and neoadjuvant Taxol  Continue eribulin  From cycle 2 of eribulin, will add Neulasta support (because, developed severe neutropenia 7 days after day 1 cycle 1 of eribulin  Check CBC and CMP prior to each dose of eribulin   Check tumor markers including CEA, CA 15-3, and CA 27.29 level every month to assess response (next, 04/09/2023)  Re-stage with contrast enhanced CT scans of C/A/P 2 months (around 04/23/2023) after starting eribulin (02/23/2023)    Continue Norco for pain in right breast and in the back    03/06/2023: Anorexia; start Megace 800 mg p.o. q.day    Eribulin:  Schedule:  Days 1 and 8: Eribulin 1.4 mg/m² IV push  Repeat cycle every 3 weeks  IV infusion over 2-5 minutes    Warnings/precautions with eribulin:  1.  Bone marrow suppression.  Severe neutropenia and neutropenic fever.  2.  Peripheral neuropathy  3.  QT prolongation    Adverse reactions with eribulin:  >10%  Peripheral edema, fatigue, peripheral neuropathy, headache, alopecia, hypokalemia, hypocalcemia, nausea, constipation, abdominal pain, neutropenia, anemia, increased ALT, increased AST, etc.    Follow-up with NP in 2 weeks    Above discussed at length with the patient.  All questions answered.    Discussed labs and scans and gave her copies of relevant records.    Plan of management discussed once again.  Have already explained and reinforced that she has stage IV disease which is incurable but we can attempt palliation with systemic chemotherapy; response can not be  guaranteed and long-term prognosis is guarded.    She understands and agrees with this plan.    Follow-up:  No follow-ups on file.

## 2023-03-30 NOTE — Clinical Note
Orders for today:  Find out the status of radiotherapy to brain metastasis Need the report of liquid biopsy (because NGS testing on liver biopsy specimen was not feasible)  Continue chemotherapy From cycle 2 of chemotherapy onwards, add Neulasta CBC and CMP prior to each cycle of eribulin Check CEA, CA 15-3, and CA 27.29 levels every month; next, 04/09/2023  Re-stage with contrast enhanced CT scans of C/A/P 04/23/2023 Continue Norco for right breast and back pain  Continue Neulasta  Follow-up with NP in 2 weeks

## 2023-04-05 PROCEDURE — 77338 DESIGN MLC DEVICE FOR IMRT: CPT | Performed by: RADIOLOGY

## 2023-04-05 PROCEDURE — 77300 RADIATION THERAPY DOSE PLAN: CPT | Performed by: RADIOLOGY

## 2023-04-06 ENCOUNTER — INFUSION (OUTPATIENT)
Dept: INFUSION THERAPY | Facility: HOSPITAL | Age: 68
End: 2023-04-06
Attending: INTERNAL MEDICINE
Payer: MEDICAID

## 2023-04-06 PROCEDURE — 77301 RADIOTHERAPY DOSE PLAN IMRT: CPT | Performed by: RADIOLOGY

## 2023-04-06 NOTE — PLAN OF CARE
notified Dr Hatfield- response noted below    Hold chemotherapy today  Recheck CBC in 1 week; at that time, proceed with chemo (day 8) if ANC =/> 1000 mm3, at a reduced dose of 1.1 mg per m2, then weaned at least 2 weeks before beginning the next cycle  If ANC remains < 1000 mm3 by day 15, then, omit day 8 dose

## 2023-04-10 ENCOUNTER — OFFICE VISIT (OUTPATIENT)
Dept: FAMILY MEDICINE | Facility: CLINIC | Age: 68
End: 2023-04-10
Payer: MEDICAID

## 2023-04-10 VITALS
HEART RATE: 100 BPM | BODY MASS INDEX: 20.66 KG/M2 | TEMPERATURE: 98 F | OXYGEN SATURATION: 100 % | RESPIRATION RATE: 22 BRPM | WEIGHT: 116.63 LBS | SYSTOLIC BLOOD PRESSURE: 100 MMHG | DIASTOLIC BLOOD PRESSURE: 68 MMHG | HEIGHT: 63 IN

## 2023-04-10 DIAGNOSIS — Z65.8 IMPAIRED ACCEPTANCE OF HEALTH STATUS: Primary | ICD-10-CM

## 2023-04-10 DIAGNOSIS — F32.A MINIMAL DEPRESSION: ICD-10-CM

## 2023-04-10 PROCEDURE — 1159F PR MEDICATION LIST DOCUMENTED IN MEDICAL RECORD: ICD-10-PCS | Mod: CPTII,,, | Performed by: NURSE PRACTITIONER

## 2023-04-10 PROCEDURE — 3288F FALL RISK ASSESSMENT DOCD: CPT | Mod: CPTII,,, | Performed by: NURSE PRACTITIONER

## 2023-04-10 PROCEDURE — 3288F PR FALLS RISK ASSESSMENT DOCUMENTED: ICD-10-PCS | Mod: CPTII,,, | Performed by: NURSE PRACTITIONER

## 2023-04-10 PROCEDURE — 3008F PR BODY MASS INDEX (BMI) DOCUMENTED: ICD-10-PCS | Mod: CPTII,,, | Performed by: NURSE PRACTITIONER

## 2023-04-10 PROCEDURE — 90833 PSYTX W PT W E/M 30 MIN: CPT | Mod: ,,, | Performed by: NURSE PRACTITIONER

## 2023-04-10 PROCEDURE — 99214 PR OFFICE/OUTPT VISIT, EST, LEVL IV, 30-39 MIN: ICD-10-PCS | Mod: S$PBB,,, | Performed by: NURSE PRACTITIONER

## 2023-04-10 PROCEDURE — 3074F SYST BP LT 130 MM HG: CPT | Mod: CPTII,,, | Performed by: NURSE PRACTITIONER

## 2023-04-10 PROCEDURE — 1101F PR PT FALLS ASSESS DOC 0-1 FALLS W/OUT INJ PAST YR: ICD-10-PCS | Mod: CPTII,,, | Performed by: NURSE PRACTITIONER

## 2023-04-10 PROCEDURE — 1126F PR PAIN SEVERITY QUANTIFIED, NO PAIN PRESENT: ICD-10-PCS | Mod: CPTII,,, | Performed by: NURSE PRACTITIONER

## 2023-04-10 PROCEDURE — 1126F AMNT PAIN NOTED NONE PRSNT: CPT | Mod: CPTII,,, | Performed by: NURSE PRACTITIONER

## 2023-04-10 PROCEDURE — 99214 OFFICE O/P EST MOD 30 MIN: CPT | Mod: S$PBB,,, | Performed by: NURSE PRACTITIONER

## 2023-04-10 PROCEDURE — 90833 PR PSYCHOTHERAPY W/PATIENT W/E&M, 30 MIN (ADD ON): ICD-10-PCS | Mod: ,,, | Performed by: NURSE PRACTITIONER

## 2023-04-10 PROCEDURE — 3078F DIAST BP <80 MM HG: CPT | Mod: CPTII,,, | Performed by: NURSE PRACTITIONER

## 2023-04-10 PROCEDURE — 3078F PR MOST RECENT DIASTOLIC BLOOD PRESSURE < 80 MM HG: ICD-10-PCS | Mod: CPTII,,, | Performed by: NURSE PRACTITIONER

## 2023-04-10 PROCEDURE — 3008F BODY MASS INDEX DOCD: CPT | Mod: CPTII,,, | Performed by: NURSE PRACTITIONER

## 2023-04-10 PROCEDURE — 1159F MED LIST DOCD IN RCRD: CPT | Mod: CPTII,,, | Performed by: NURSE PRACTITIONER

## 2023-04-10 PROCEDURE — 1101F PT FALLS ASSESS-DOCD LE1/YR: CPT | Mod: CPTII,,, | Performed by: NURSE PRACTITIONER

## 2023-04-10 PROCEDURE — 3074F PR MOST RECENT SYSTOLIC BLOOD PRESSURE < 130 MM HG: ICD-10-PCS | Mod: CPTII,,, | Performed by: NURSE PRACTITIONER

## 2023-04-10 PROCEDURE — 99214 OFFICE O/P EST MOD 30 MIN: CPT | Mod: PBBFAC | Performed by: NURSE PRACTITIONER

## 2023-04-10 NOTE — PROGRESS NOTES
"Chief Complaint: "Nothing--everything is good"    Mental Status Exam    Mood: Euthymic  PHQ:  1 (1-4=minimal depression) Self Rating: Depression: 0/10 and Anxiety: 0/10  Thought Process: Goal directed  Thought Content: Hallucinations: Not present Delusions: Not present  Speech: No deficits  Attitude: Cooperative    Affect: Full range-mood congruent   Appearance: Clean and Casual  Motor Activity: No deficits patient in a wheelchair  Attention/Concentration: Intact  Judgement: Intact  Orientation: Date: yes, Place: yes, Person: yes, Situations: yes  Memory: Immediate: 3/3, Recent: 3/3, Remote: 3/3  Abstract Thinking: Age Appropriate  Gross Est. Of Intelligence: Average  Assets: verbal and support system   Insight: Limited  Self Harm: Not present  Harm to Others: Not present    Assessments:   PHQ9:   PHQ9 4/10/2023   Total Score 1       Depression Patient Health Questionnaire 4/10/2023   Over the last two weeks how often have you been bothered by little interest or pleasure in doing things Not at all   Over the last two weeks how often have you been bothered by feeling down, depressed or hopeless Not at all   PHQ-2 Total Score 0   Over the last two weeks how often have you been bothered by trouble falling or staying asleep, or sleeping too much Not at all   Over the last two weeks how often have you been bothered by feeling tired or having little energy Not at all   Over the last two weeks how often have you been bothered by a poor appetite or overeating Several days   Over the last two weeks how often have you been bothered by feeling bad about yourself - or that you are a failure or have let yourself or your family down Not at all   Over the last two weeks how often have you been bothered by trouble concentrating on things, such as reading the newspaper or watching television Not at all   Over the last two weeks how often have you been bothered by moving or speaking so slowly that other people could have noticed. Or " "the opposite - being so fidgety or restless that you have been moving around a lot more than usual. Not at all   Over the last two weeks how often have you been bothered by thoughts that you would be better off dead, or of hurting yourself Not at all   If you checked off any problems, how difficult have these problems made it for you to do your work, take care of things at home or get along with other people? Not difficult at all   Total Score 1   Interpretation Minimal or None          GAD7:   GAD7 4/10/2023 3/3/2023   1. Feeling nervous, anxious, or on edge? 0 0   2. Not being able to stop or control worrying? 0 0   3. Worrying too much about different things? 0 0   4. Trouble relaxing? 0 0   5. Being so restless that it is hard to sit still? 0 0   6. Becoming easily annoyed or irritable? 0 2   7. Feeling afraid as if something awful might happen? 0 0   8. If you checked off any problems, how difficult have these problems made it for you to do your work, take care of things at home, or get along with other people? 0 0   ILAN-7 Score 0 2   Trauma History:  History of Emotional/Mental abuse: Denies  History of Physical abuse: Denies  History of Sexual abuse: Denies  History of other trauma: Denies     Legal:  Denies prior arrests, charges, conviction  Denies any upcoming court dates  Denies any pending charges.     Substance Use:     Hx. Smoking tobacco--onset 21 or 23 y/o--stopped smoking "a couple of months ago".  Was smoking 1/2 ppd.        Family History:      Father was alcoholic.  Denies any  other family mental illness     Support System: 1)  Family--, sister, brother, kids     Coping Strategies: 1)  "Don't do anything--get through it"    Stressors: "No stress"    Goals:  "I don't know"      Previous Treatment:   No     Social History:   Grew up in:  [place], [place]  Raised by: Biological parents  Number of siblings: 4  Patient birth order: 4th  Describes household as:  "sometimes we had problems and " "sometimes we were ok--dad was an alcoholic"    Education: High School 1977  Marital status:   Number of children: Two  Employment:  Unemployed  Living situation: , 43 y/o daughter and 49 y/o step daughter     Current Presentation:  PCP: DAXA Hatfield MD  Referred For: Depression  Initial Visit: No  Last Visit: 03/03/2023    Here for followup visit. On 03/03/2023 she processed early emotions associated with having cancer diagnosis in right breast.  Initially surprised, but was thinking it might be cancer when she saw the lump. States she is not a candidate for surgery at this time. Initially had 8 chemotherapy sessions--stopped for a while, and began again "a couple of weeks ago".  Unsure of how many more she is to have.  EMR reveals "IDC right breast, axillary lymph node biopsy +, presumed triple negative, AJCC stage IIB, clinical prognostic stage IIIB, -liver lesions; adrenal lesions; -History of right lower extremity DVT--right thyroid lesion.     Today:  Initial breast cancer diagnosis was 4/7/2022, she began chemotherapy 4/13/2022, and it lasted until 10/13/2022. Liver metastasis diagnosed 2/8/2023, additional chemotherapy 2/23/2023-10/14/2023 with treatment goal: Palliative.  EMR reveals that on 03/06/2023 she had a brain MRI showing brain metastasis, and she was referred to Radiation Oncology for palliative radiotherapy ASAP.     "I don't have any problems today".  Processes appointments upcoming--chemotherapy, radiation, and ultrasound of "probably kidney and liver", and isn't sure why she is having diagnostics.    "The doctor says I'm doing better".   She did not speak of brain metastasis, and when asked where her cancer was she stated breast, kidney and liver.  Patient continues to state she is doing well, and has nothing to address in counseling session.   Review of previous appointment reveals that a decision had been made at that time that the patient would not have future appointments " unless she felt the need.  Today's appointment was erroneously scheduled.  This provider again informed patient that she is welcome to call for additional appointments if she wishes.  Verbalizes understanding.    Endorses:   Depression symptoms: denies   Anxiety symptoms: denies     Psychotherapy:  Target symptoms:  Denies symptoms  Why chosen therapy is appropriate versus another modality:  Not applicable   Outcome monitoring methods:  None at this time  Therapeutic intervention type:  Supportive of patient's desire to not continue therapy  Topics discussed/themes:  Breast cancer, treatment, and sequale  The patient's response to the intervention is:  accepting  The patient's progress toward treatment goals is  Patient doesn't wish to continue therapy .        Review of systems:   See most recent ROS per PCP    Assessment:      1. Impaired acceptance of health status    2. Minimal depression           Plan:   Patient Instructions   Meds as directed  Keep all healthcare appointments  Utilize self-interventions from patient education materials  Nearest ER if overwhelmed  No further appointments. Patient will call if she wishes to return.    I spent 35 minutes in face-to-face psychotherapy with an additional 30 minutes for E/M services on this date of service.

## 2023-04-10 NOTE — PATIENT INSTRUCTIONS
Meds as directed  Keep all healthcare appointments  Utilize self-interventions from patient education materials  Nearest ER if overwhelmed  No further appointments. Patient will call if she wishes to return.

## 2023-04-12 ENCOUNTER — OFFICE VISIT (OUTPATIENT)
Dept: RADIATION THERAPY | Facility: HOSPITAL | Age: 68
End: 2023-04-12
Attending: RADIOLOGY
Payer: MEDICAID

## 2023-04-12 ENCOUNTER — OFFICE VISIT (OUTPATIENT)
Dept: OTOLARYNGOLOGY | Facility: CLINIC | Age: 68
End: 2023-04-12
Payer: MEDICAID

## 2023-04-12 VITALS
WEIGHT: 116 LBS | DIASTOLIC BLOOD PRESSURE: 72 MMHG | HEART RATE: 86 BPM | TEMPERATURE: 98 F | BODY MASS INDEX: 20.55 KG/M2 | SYSTOLIC BLOOD PRESSURE: 110 MMHG

## 2023-04-12 DIAGNOSIS — E04.1 RIGHT THYROID NODULE: Primary | ICD-10-CM

## 2023-04-12 PROCEDURE — 99213 OFFICE O/P EST LOW 20 MIN: CPT | Mod: PBBFAC | Performed by: STUDENT IN AN ORGANIZED HEALTH CARE EDUCATION/TRAINING PROGRAM

## 2023-04-12 PROCEDURE — 77372 SRS LINEAR BASED: CPT | Performed by: RADIOLOGY

## 2023-04-12 NOTE — PROGRESS NOTES
[facility]  Otolaryngology Clinic Note    Asia Gallagher  Encounter Date: 4/12/2023  YOB: 1955  Physician: Tino Gonzalez MD    Chief Complaint: thyroid nodules    HPI: Asia Gallagher is a 67 y.o. female PMH IDC breast cancer s/p  Chemo, right LE DVT, referred for multinodular goiter.  Had ultrasound obtained on 10/18/22 with several nodules with biopsy recommended. She denies any dysphgia, odynophagia, hoarseness, sore throat, or palpable lumps or bumps of the neck.  No history of radiation, family history of breast or thyroid cancers.  She is no longer on blood thinners for her DVT    1/12/29: Here today for follow up after IR bx of nodules. Patient states she has a new lump in her breast, meeting with Dr Hatfield next week, also supposed to have breast resection. Patient denies any issues with thyroid, swallowing well, breathing well. No changes to hearing, vision, nasal obstruction, mouth.     4/12/23: Here today for follow up. The plan at the last visit was to return with a repeat thyroid US which was obtained shortly after the last visit. She reports that she isn't sure why she's here in clinic today. Denies any new lumps/bumps in her neck or trouble swallowing. No voice changes. When asked about the plan for her breast cancer she says that she is unsure but she is supposed to get chemo tomorrow.     ROS:   General: Negative except per HPI  Skin: Denies rash, ulcer, or lesion.  Eyes: Denies vision changes or diplopia.  Ears: Negative except per HPI  Nose: Negative except per HPI  Throat/mouth: Negative except per HPI  Cardiovascular: Negative except per HPI  Respiratory: Negative except per HPI  Neck: Negative except per HPI  Endocrine: Negative except per HPI  Neurologic: Negative except per HPI    Other 10-point review of systems negative except per HPI      Review of patient's allergies indicates:  No Known Allergies    Past Medical History:   Diagnosis Date    Breast  cancer     Liver metastases        Past Surgical History:   Procedure Laterality Date    BREAST BIOPSY      CHOLECYSTECTOMY         Social History     Socioeconomic History    Marital status:     Number of children: 2   Occupational History    Occupation: retired   Tobacco Use    Smoking status: Former     Packs/day: 0.25     Years: 40.00     Pack years: 10.00     Types: Cigarettes     Quit date: 3/5/2022     Years since quittin.1    Smokeless tobacco: Never   Substance and Sexual Activity    Alcohol use: Not Currently    Drug use: Never    Sexual activity: Yes     Partners: Male       Family History   Problem Relation Age of Onset    Cancer Neg Hx        Outpatient Encounter Medications as of 2023   Medication Sig Dispense Refill    dexAMETHasone (DECADRON) 4 MG Tab Take 5 tablets (20 mg total) by mouth As instructed (for chemotherapy). Take 5 tablets (20 mg total) po 12 hours before treatment and then repeat 6 hours before chemotherapy treatment 30 tablet 0    HYDROcodone-acetaminophen (NORCO) 5-325 mg per tablet Take 1 tablet by mouth every 4 (four) hours as needed for Pain. 60 tablet 0    megestroL (MEGACE) 800 mg/20 mL (20 mL) Susp Take 10 mLs (800 mg total) by mouth once daily. 300 mL 2    ondansetron (ZOFRAN) 8 MG tablet Take 1 tablet (8 mg total) by mouth every 8 (eight) hours as needed for Nausea. (Patient not taking: Reported on 2022) 30 tablet 2     No facility-administered encounter medications on file as of 2023.       Physical Exam:  Vitals:    23 1156   BP: 110/72   Pulse: 86   Temp: 98.1 °F (36.7 °C)   TempSrc: Oral   Weight: 52.6 kg (116 lb)       Constitutional  General Appearance: well nourished, well-developed, AAO x3, NAD  HEENT  Eyes: PEERLA, EOMI, normal conjunctivae  Ears: Hearing well at conversation level;  Nose: septum midline, no inferior turbinate hypertrophy, no polyps, moist mucosa without erythema or blue hue  OC/OP: dentition moderate, no oral  lesions, tongue/FOM/BOT- soft, no leukoplakia/ulcerations/ tenderness  Nasopharynx, Hypopharynx, and Larynx:    Indirect: attempted, limited view due to patient intolerance  Neck: soft, non-tender, no palpable lymph nodes   Thyroid region- no nodules palpable  Neuro: CN II - XII intact bilaterally  Cardiovascular: peripheral pulses palpable  Respiratory: non-labored respirations  Psychiatric: oriented to time, place and person, no depression, anxiety or agitation    Pertinent Data:      FNA:  Final Diagnosis   1. Fine needle aspiration of left thyroid nodule:   -  Unsatisfactory. Insufficient cells for diagnostic evaluation.        2. Fine needle aspiration of right thyroid nodule:   - Cytomorphology  of a benign thyroid nodule.     EXAMINATION:  US SOFT TISSUE HEAD NECK THYROID     CLINICAL HISTORY:  Nontoxic multinodular goiter     TECHNIQUE:  Ultrasound of the thyroid and cervical lymph nodes was performed.     COMPARISON:  October 17, 2022     FINDINGS:  Right lobe: 5.2 x 1.7 x 1.8 cm     Isthmus: 2 mm     Left lobe: 3.6 x 1.4 x 1.3 cm     A solid predominantly isoechoic right thyroid lobe nodule measures 1.6 cm in greatest diameter.  A 2nd predominantly isoechoic solid nodule in the right thyroid lobe more superiorly measures 1.8 cm in greatest diameter.  This thyroid nodule measured 1.5 cm in greatest diameter on the prior study, and appears to be hypermetabolic on the reference PET.  A heterogeneous solid nodule at the right surface of the isthmus measures 1.2 cm in diameter, unchanged.  A mixed cystic and solid nodule in the left thyroid lobe measures 1.1 cm in diameter, unchanged.  A dominant cystic and solid nodule in the left thyroid lobe measures 1.6 cm in diameter with partially obscured borders, similar in appearance when compared to the prior study accounting for differences in sonographer measurement technique.  This nodule appears to have been biopsied prior.  There are no microcalcifications.  No  incidental abnormal cervical lymph nodes are observed.     Impression:     The hypermetabolic right thyroid lobe upper pole dominant 1.8 cm diameter isoechoic nodule appear slightly enlarged from prior 1.5 cm.     Grossly stable 1.6 cm diameter cystic and solid left thyroid lobe nodule with partially obscured borders, defer to prior FNA results.    Assessment/Plan:  Asia Gallagher is a 67 y.o. female with triple negative breast cancer with new metastasis to her liver and brain. She is scheduled for chemotherapy per Dr. Hatfield and has been referred to rad/onc for the brain lesions. She is following up in ENT clinic for bilateral thyroid nodules. She had a right and left nodule biopsied in December 2022. The left side was benign and the right side was unsatisfactory. The right nodule was previously hypermetabolic on PET, 1.8cm on US from January. Will plan to repeat an US in 1 year. If it grows could consider another biopsy. We had a discussion today that the most important thing right now is treatment of her metastatic breast cancer and we will remain conservative as far as the thyroid nodules go.   - US thyroid 1 year. F/u with ENT telehealth appt to discuss results.     Tessa Cottrell MD  [facility] Department of Otolaryngology  -IV

## 2023-04-13 ENCOUNTER — INFUSION (OUTPATIENT)
Dept: INFUSION THERAPY | Facility: HOSPITAL | Age: 68
End: 2023-04-13
Attending: INTERNAL MEDICINE
Payer: MEDICAID

## 2023-04-13 VITALS
SYSTOLIC BLOOD PRESSURE: 98 MMHG | BODY MASS INDEX: 20.9 KG/M2 | WEIGHT: 117.94 LBS | TEMPERATURE: 98 F | HEART RATE: 91 BPM | DIASTOLIC BLOOD PRESSURE: 59 MMHG | OXYGEN SATURATION: 100 % | HEIGHT: 63 IN | RESPIRATION RATE: 20 BRPM

## 2023-04-13 DIAGNOSIS — R59.0 AXILLARY LYMPHADENOPATHY: ICD-10-CM

## 2023-04-13 DIAGNOSIS — C50.011 MALIGNANT NEOPLASM OF NIPPLE OF RIGHT BREAST IN FEMALE, UNSPECIFIED ESTROGEN RECEPTOR STATUS: ICD-10-CM

## 2023-04-13 DIAGNOSIS — C79.31 SECONDARY MALIGNANT NEOPLASM OF BRAIN AND SPINAL CORD: Primary | ICD-10-CM

## 2023-04-13 DIAGNOSIS — C79.49 SECONDARY MALIGNANT NEOPLASM OF BRAIN AND SPINAL CORD: Primary | ICD-10-CM

## 2023-04-13 DIAGNOSIS — C78.7 MALIGNANT NEOPLASM METASTATIC TO LIVER: Primary | ICD-10-CM

## 2023-04-13 PROCEDURE — 25000003 PHARM REV CODE 250: Performed by: INTERNAL MEDICINE

## 2023-04-13 PROCEDURE — 96409 CHEMO IV PUSH SNGL DRUG: CPT

## 2023-04-13 PROCEDURE — 63600175 PHARM REV CODE 636 W HCPCS: Performed by: INTERNAL MEDICINE

## 2023-04-13 PROCEDURE — 96375 TX/PRO/DX INJ NEW DRUG ADDON: CPT

## 2023-04-13 PROCEDURE — A4216 STERILE WATER/SALINE, 10 ML: HCPCS | Performed by: INTERNAL MEDICINE

## 2023-04-13 RX ORDER — HEPARIN 100 UNIT/ML
500 SYRINGE INTRAVENOUS
Status: DISCONTINUED | OUTPATIENT
Start: 2023-04-13 | End: 2023-04-13 | Stop reason: HOSPADM

## 2023-04-13 RX ORDER — ONDANSETRON 2 MG/ML
8 INJECTION INTRAMUSCULAR; INTRAVENOUS
Status: COMPLETED | OUTPATIENT
Start: 2023-04-13 | End: 2023-04-13

## 2023-04-13 RX ORDER — SODIUM CHLORIDE 0.9 % (FLUSH) 0.9 %
10 SYRINGE (ML) INJECTION
Status: DISCONTINUED | OUTPATIENT
Start: 2023-04-13 | End: 2023-04-13 | Stop reason: HOSPADM

## 2023-04-13 RX ADMIN — ERIBULIN MESYLATE 1.7 MG: 0.5 INJECTION INTRAVENOUS at 09:04

## 2023-04-13 RX ADMIN — ONDANSETRON 8 MG: 2 INJECTION INTRAMUSCULAR; INTRAVENOUS at 09:04

## 2023-04-13 RX ADMIN — Medication 10 ML: at 10:04

## 2023-04-13 RX ADMIN — SODIUM CHLORIDE: 9 INJECTION, SOLUTION INTRAVENOUS at 08:04

## 2023-04-13 RX ADMIN — HEPARIN 500 UNITS: 100 SYRINGE at 10:04

## 2023-04-13 NOTE — NURSING
0821  Pt did labs and is here for C 2 D 8 delayed halaven.  Alk 183 and cr cl 49.4.  Message sent to Dr. Hatfield to review labs to see if ok to proceed with chemo.  Pt states she feels fine.  Denies any pain and reports mild constipation.  [id number]  Dr. Hatfield responded to proceed with chemo.

## 2023-04-14 ENCOUNTER — INFUSION (OUTPATIENT)
Dept: INFUSION THERAPY | Facility: HOSPITAL | Age: 68
End: 2023-04-14
Attending: INTERNAL MEDICINE
Payer: MEDICAID

## 2023-04-14 VITALS
RESPIRATION RATE: 20 BRPM | DIASTOLIC BLOOD PRESSURE: 61 MMHG | SYSTOLIC BLOOD PRESSURE: 116 MMHG | OXYGEN SATURATION: 100 % | TEMPERATURE: 98 F | HEART RATE: 96 BPM

## 2023-04-14 DIAGNOSIS — C78.7 MALIGNANT NEOPLASM METASTATIC TO LIVER: Primary | ICD-10-CM

## 2023-04-14 DIAGNOSIS — R59.0 AXILLARY LYMPHADENOPATHY: ICD-10-CM

## 2023-04-14 DIAGNOSIS — C50.011 MALIGNANT NEOPLASM OF NIPPLE OF RIGHT BREAST IN FEMALE, UNSPECIFIED ESTROGEN RECEPTOR STATUS: ICD-10-CM

## 2023-04-14 PROCEDURE — 63600175 PHARM REV CODE 636 W HCPCS: Mod: JZ,JG | Performed by: INTERNAL MEDICINE

## 2023-04-14 RX ADMIN — PEGFILGRASTIM 6 MG: 6 INJECTION SUBCUTANEOUS at 01:04

## 2023-04-24 ENCOUNTER — HOSPITAL ENCOUNTER (OUTPATIENT)
Dept: RADIOLOGY | Facility: HOSPITAL | Age: 68
Discharge: HOME | End: 2023-04-24
Attending: INTERNAL MEDICINE
Payer: MEDICAID

## 2023-04-24 DIAGNOSIS — C50.911 RECURRENT BREAST CANCER, RIGHT: ICD-10-CM

## 2023-04-24 DIAGNOSIS — C78.7 MALIGNANT NEOPLASM METASTATIC TO LIVER: ICD-10-CM

## 2023-04-24 PROCEDURE — 25500020 PHARM REV CODE 255

## 2023-04-24 PROCEDURE — 71260 CT THORAX DX C+: CPT | Mod: TC

## 2023-04-24 PROCEDURE — 74177 CT ABD & PELVIS W/CONTRAST: CPT | Mod: TC

## 2023-04-24 RX ADMIN — IOHEXOL 100 ML: 350 INJECTION, SOLUTION INTRAVENOUS at 07:04

## 2023-04-27 ENCOUNTER — OFFICE VISIT (OUTPATIENT)
Dept: HEMATOLOGY/ONCOLOGY | Facility: CLINIC | Age: 68
End: 2023-04-27
Payer: MEDICAID

## 2023-04-27 ENCOUNTER — INFUSION (OUTPATIENT)
Dept: INFUSION THERAPY | Facility: HOSPITAL | Age: 68
End: 2023-04-27
Attending: INTERNAL MEDICINE
Payer: MEDICAID

## 2023-04-27 VITALS
RESPIRATION RATE: 20 BRPM | HEART RATE: 79 BPM | HEIGHT: 63 IN | WEIGHT: 112.19 LBS | SYSTOLIC BLOOD PRESSURE: 106 MMHG | BODY MASS INDEX: 19.88 KG/M2 | TEMPERATURE: 98 F | DIASTOLIC BLOOD PRESSURE: 70 MMHG | OXYGEN SATURATION: 100 %

## 2023-04-27 DIAGNOSIS — R59.0 AXILLARY LYMPHADENOPATHY: ICD-10-CM

## 2023-04-27 DIAGNOSIS — C50.911 MALIGNANT NEOPLASM OF RIGHT FEMALE BREAST, UNSPECIFIED ESTROGEN RECEPTOR STATUS, UNSPECIFIED SITE OF BREAST: Primary | ICD-10-CM

## 2023-04-27 DIAGNOSIS — C50.011 MALIGNANT NEOPLASM OF NIPPLE OF RIGHT BREAST IN FEMALE, UNSPECIFIED ESTROGEN RECEPTOR STATUS: ICD-10-CM

## 2023-04-27 DIAGNOSIS — C78.7 MALIGNANT NEOPLASM METASTATIC TO LIVER: Primary | ICD-10-CM

## 2023-04-27 PROCEDURE — 3288F FALL RISK ASSESSMENT DOCD: CPT | Mod: CPTII,,, | Performed by: NURSE PRACTITIONER

## 2023-04-27 PROCEDURE — 3078F PR MOST RECENT DIASTOLIC BLOOD PRESSURE < 80 MM HG: ICD-10-PCS | Mod: CPTII,,, | Performed by: NURSE PRACTITIONER

## 2023-04-27 PROCEDURE — 3074F PR MOST RECENT SYSTOLIC BLOOD PRESSURE < 130 MM HG: ICD-10-PCS | Mod: CPTII,,, | Performed by: NURSE PRACTITIONER

## 2023-04-27 PROCEDURE — 99213 OFFICE O/P EST LOW 20 MIN: CPT | Mod: PBBFAC,25 | Performed by: NURSE PRACTITIONER

## 2023-04-27 PROCEDURE — 1126F AMNT PAIN NOTED NONE PRSNT: CPT | Mod: CPTII,,, | Performed by: NURSE PRACTITIONER

## 2023-04-27 PROCEDURE — 1160F PR REVIEW ALL MEDS BY PRESCRIBER/CLIN PHARMACIST DOCUMENTED: ICD-10-PCS | Mod: CPTII,,, | Performed by: NURSE PRACTITIONER

## 2023-04-27 PROCEDURE — 3078F DIAST BP <80 MM HG: CPT | Mod: CPTII,,, | Performed by: NURSE PRACTITIONER

## 2023-04-27 PROCEDURE — 96413 CHEMO IV INFUSION 1 HR: CPT

## 2023-04-27 PROCEDURE — 1159F MED LIST DOCD IN RCRD: CPT | Mod: CPTII,,, | Performed by: NURSE PRACTITIONER

## 2023-04-27 PROCEDURE — 3008F BODY MASS INDEX DOCD: CPT | Mod: CPTII,,, | Performed by: NURSE PRACTITIONER

## 2023-04-27 PROCEDURE — 1101F PT FALLS ASSESS-DOCD LE1/YR: CPT | Mod: CPTII,,, | Performed by: NURSE PRACTITIONER

## 2023-04-27 PROCEDURE — 99214 OFFICE O/P EST MOD 30 MIN: CPT | Mod: S$PBB,,, | Performed by: NURSE PRACTITIONER

## 2023-04-27 PROCEDURE — 1126F PR PAIN SEVERITY QUANTIFIED, NO PAIN PRESENT: ICD-10-PCS | Mod: CPTII,,, | Performed by: NURSE PRACTITIONER

## 2023-04-27 PROCEDURE — 3008F PR BODY MASS INDEX (BMI) DOCUMENTED: ICD-10-PCS | Mod: CPTII,,, | Performed by: NURSE PRACTITIONER

## 2023-04-27 PROCEDURE — 1101F PR PT FALLS ASSESS DOC 0-1 FALLS W/OUT INJ PAST YR: ICD-10-PCS | Mod: CPTII,,, | Performed by: NURSE PRACTITIONER

## 2023-04-27 PROCEDURE — 99214 PR OFFICE/OUTPT VISIT, EST, LEVL IV, 30-39 MIN: ICD-10-PCS | Mod: S$PBB,,, | Performed by: NURSE PRACTITIONER

## 2023-04-27 PROCEDURE — 3074F SYST BP LT 130 MM HG: CPT | Mod: CPTII,,, | Performed by: NURSE PRACTITIONER

## 2023-04-27 PROCEDURE — 63600175 PHARM REV CODE 636 W HCPCS: Performed by: INTERNAL MEDICINE

## 2023-04-27 PROCEDURE — 25000003 PHARM REV CODE 250: Performed by: INTERNAL MEDICINE

## 2023-04-27 PROCEDURE — 96375 TX/PRO/DX INJ NEW DRUG ADDON: CPT

## 2023-04-27 PROCEDURE — 3288F PR FALLS RISK ASSESSMENT DOCUMENTED: ICD-10-PCS | Mod: CPTII,,, | Performed by: NURSE PRACTITIONER

## 2023-04-27 PROCEDURE — 96409 CHEMO IV PUSH SNGL DRUG: CPT

## 2023-04-27 PROCEDURE — 1160F RVW MEDS BY RX/DR IN RCRD: CPT | Mod: CPTII,,, | Performed by: NURSE PRACTITIONER

## 2023-04-27 PROCEDURE — 1159F PR MEDICATION LIST DOCUMENTED IN MEDICAL RECORD: ICD-10-PCS | Mod: CPTII,,, | Performed by: NURSE PRACTITIONER

## 2023-04-27 RX ORDER — ONDANSETRON 2 MG/ML
8 INJECTION INTRAMUSCULAR; INTRAVENOUS
Status: COMPLETED | OUTPATIENT
Start: 2023-04-27 | End: 2023-04-27

## 2023-04-27 RX ORDER — SODIUM CHLORIDE 0.9 % (FLUSH) 0.9 %
10 SYRINGE (ML) INJECTION
Status: DISCONTINUED | OUTPATIENT
Start: 2023-04-27 | End: 2023-04-27 | Stop reason: HOSPADM

## 2023-04-27 RX ORDER — HEPARIN 100 UNIT/ML
500 SYRINGE INTRAVENOUS
Status: DISCONTINUED | OUTPATIENT
Start: 2023-04-27 | End: 2023-04-27 | Stop reason: HOSPADM

## 2023-04-27 RX ADMIN — ONDANSETRON 8 MG: 2 INJECTION INTRAMUSCULAR; INTRAVENOUS at 10:04

## 2023-04-27 RX ADMIN — ERIBULIN MESYLATE 1.65 MG: 0.5 INJECTION INTRAVENOUS at 11:04

## 2023-04-27 RX ADMIN — HEPARIN 500 UNITS: 100 SYRINGE at 11:04

## 2023-04-27 NOTE — Clinical Note
Infusion today halaven cycle 3 day 1 Infusion on 5/4/23 with labwork cycle 3 day 8  Infusion on 5/5/23 injection  Fu w/ in 3 weeks on 5/18/23 with lab work + infusion cycle 4 day 1 Halaven and to review scans

## 2023-04-27 NOTE — PROGRESS NOTES
Reason for Follow-up:  -IDC right breast, axillary lymph node biopsy +, presumed triple negative, AJCC stage IIB, clinical prognostic stage IIIB  -liver metastases; adrenal lesions  -brain metastasis  -History of right lower extremity DVT    -right thyroid lesion     Current Treatment:  -eribulin   started 02/23/2023 (dose reduced to 1.1 mg per m2 due to reduced creatinine clearance)    Treatment History:  -03/02/2022: Mediport placed  -neoadjuvant DD AC (4/13/2022-6/2/2022)  -S/P neoadjuvant dose dense Taxol x4 (08/31/2022-10/12/2022)    Past medical history: Obesity.  Tobacco abuse.   -02/19/2020: Acute nonocclusive DVT in the right distal femoral vein; acute occlusive DVTs right popliteal and right peroneal veins ((she never followed up on that)  Procedure/surgical history: Cholecystectomy (2001).  Bilateral tubal ligation (according to her).  Social history: .  Lives in [place], [place].  Has 2 children.  Children.  Does not work.  Smoked half a pack of cigarettes daily for 40 years; quit 2 weeks ago.  No alcohol or illicit drugs.  Family history: No family history of cancer.  Health maintenance: According to her, screening colonoscopy 5 years ago at [facility], probably revealing a benign polyp.  Menstrual and OB/GYN history: Menarche, age 13.  Menopause, age 59.  G2, P2.  No abortions or miscarriages.  Age at delivery of first child, 22.  Did not breast-feed her children.  Used birth control pills for 1 year, subsequently, bilateral tubal ligation.  No history of hormone replacement therapy.    History of Present Illness:    66-year-old lady referred from surgery, with breast cancer.     She initially presented to emergency department 11/15/2021 for evaluation of breast pain and a palpable lump in the right breast.  Subsequent imaging studies and biopsy established diagnosis of invasive ductal carcinoma of right breast, axillary lymph node positive, ER low positive, KY negative, and HER-2  negative.    Oncologic history:   #IDC right breast, presumed triple negative:   -History of right lower extremity DVT and right breast mass 02/2020; she did not follow-up   -Presentation: 11/2021: Palpable mass   -01/07/2022: Bilateral diagnostic mammogram with contrast and limited ultrasound right breast   -01/07/2022: Biopsy   -3.1 cm mass on mammogram, overall grade 3, right axillary lymph node biopsy positive   -ER low positive (1%).  DE negative (0%).  HER2 equivocal (Score 2+).  HER-2 negative by FISH.  Ki-67 high proliferation (50%)   >>>   For the purpose of neoadjuvant chemotherapy, since ER is low positive, we will treat the patient as if triple negative   >>>  -cT2 cN1 MX, grade 3, presumed triple negative, AJCC anatomic stage IIB, clinical prognostic stage IIIB   -02/22/2022: DEXA scan: Normal BMD   -02/22/2022: CT C/A/P with contrast for staging: Right breast mass with right axillary lymphadenopathy; bilateral adrenal nodules, statistically representing adenomas   -02/22/2022: Whole-body nuclear medicine bone scan: No bone metastasis   -03/02/2022: Mediport placed   -03/11/2022: TTE: LVEF 55-60%  -neoadjuvant DD AC started 04/13/2022; severe neutropenia, ANC 0.03, on 04/26/2022; therefore, cycle 2 held; treated with growth factor and prophylactic ciprofloxacin  -cycle 2 on 05/05/2022; cycle 3 on 05/19/2022; cycle 4 on 06/02/2022   -some progression on restaging mammogram and ultrasound 06/17/2022  -06/21/2022:  She was supposed to start neoadjuvant dose dense Taxol but was sent to emergency department for evaluation because she was feeling extremely weak, sick, unable to walk for 2-3 weeks  -06/21/2022:  WBC, differential unremarkable.  Hemoglobin 9.2, stable.  Platelets 210 K. ANC 5.6.  CMP stable and within acceptable limits.  Magnesium normal.  BNP normal.  TSH 2.2180, normal.    -06/21/2022:  V/Q scan:  Normal/very low probability of pulmonary embolism  -07/15/2022:  CT C/A/P with contrast  (comparison:  02/22/2022):   Mass in the right breast with associated skin thickening in the right breast overall slightly less prominent than the prior examination.  The right axillary lymphadenopathy is also less prominent than the prior examination   Interval development of multiple punctate hypoattenuating areas within the liver concerning for possible developing metastatic foci.  Short-term follow-up is recommended.  These nodules are too small to characterize and are all subcentimeter in size.   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  CA 27.29 level normal.  CA 15-3 level normal.  -neoadjuvant dose dense Taxol started 08/31/2022  -neoadjuvant dose dense Taxol: Cycle 1 (08/31/2022); cycle 2 (09/14/2022)  -09/09/2022: Whole-body nuclear medicine bone scan (comparison:  Bone scan 02/22/2022; CT C/A/P 0 07/15/2022): No bone metastasis  -09/19/2022: PET-CT to rule out metastatic disease (comparison:  09/09/2022 bone scan; 07/15/2022 CT C/A/P):  1. Slight interval enlargement of hypermetabolic right breast mass and associated right axillary node.  2. Metastatic right hepatic lesion (central aspect of right liver, 1.9 x 1.2 cm), with no other definite FDG-avid or aggressive appearing process through the neck, chest, abdomen, or pelvis.  3. Subcentimeter [place] foci are less  conspicuous and again of limited characterization due to size and non-contrast protocol, but statistically favored to represent benign cysts.  Close attention on surveillance imaging is needed in order to ensure ongoing stability.  4. Incidental right adrenal adenoma (1.7 x 4.3 cm)  5. Diffuse osseous uptake is favored to reflect sequela of systemic therapeutic agent.  6. Hypermetabolic low-density nodule posterior right hemithyroid, 1.6 x 1.4 cm, maximum SUV 4.6, every -neoadjuvant dose dense Taxol:  Cycle 3 (09/28/2022); cycle 4 (10/12/2022)  -10/17/2022:  Thyroid ultrasound:  1. Multinodular thyroid.  2. The dominant right upper thyroid pole nodule previously demonstrated hypermetabolic activity, warranting biopsy.  3. Dominant central left thyroid nodule meets biopsy criteria.  4. Additional nodules do not meet biopsy criteria secondary to insufficient size/sonographic characteristics; recommendations for ultrasound surveillance provided above.  -10/21/2022:  MRI abdomen with and without contrast (comparison:  09/19/2022):  1. There are 2 small right hepatic lobe lesions suspicious for metastases.  One of these was hypermetabolic on recent PET-CT.  Right lobe lesion 9 mm, corresponding to hypermetabolic lesion on PET-CT.  More inferiorly in right hepatic lobe 6 mm, this was not evident on PET  2. Bilateral adrenal adenomas show no significant change going back to February 2022.  -11/04/2022: IR:  Liver lesion too small for biopsy  -11/04/2022:  Restaging bilateral diagnostic mammogram with contrast and limited ultrasound right breast (comparison:  06/17/2022):  Right breast mass 3.8 x 2.8 x 3.4 cm, previously 3.2 x 3.1 x 3.6 cm (06/17/2022) and 3 x 3.1 x 2.1 cm) 12/21/2021); right axillary lymph node 2.6 x 1 x 1.4 cm, previously 3.6 x 1.3 x 1.3 cm (06/17/2022) and 3.5 x 0.9 x 1.4 cm (12/21/2021) (right breast malignancy with nipple and skin involvement; compared to 06/17/2022, there has been interval development  of at least 2 adjacent satellite lesions upper outer right breast and interval progression in the associated calcifications; total satellite lesions measure 3.6 x 5.0 x 5.3 cm, previously 3.8 x 3.5 x 3 cm on 06/17/2022 and 3.5 by 2.6 x 2.8 cm on 01/07/2022)  -12/08/2022:  Ultrasound-guided biopsy, bilateral (FNA): Pathology:  1. Fine needle aspiration of left thyroid nodule: Unsatisfactory. Insufficient cells for diagnostic evaluation.   2. Fine needle aspiration of right thyroid nodule: Cytomorphology  of a benign thyroid nodule.  -01/09/2023: Restaging CTs C/A/P with contrast (comparison:  PET-CT 09/19/2022, MRI abdomen 09/21/2022):   1. Interval progression of disease  2. Slight interval increase in size of right breast mass (4.6 x 3 cm, previously 4 x 3 cm)  3. Interval increase in size of right axillary lymph node (1.9 cm, previously 1.1 cm)  4. Interval increase in size and number of hepatic metastases (largest mass with satellite nodularity right lobe 7.3 x 7.1 cm, previously 2 cm)  -01/30/2023:  Ultrasound soft tissues of the neck pelvic comparison:  10/17/2022):   The hypermetabolic right thyroid lobe upper pole dominant 1.8 cm diameter isoechoic nodule appear slightly enlarged from prior 1.5 cm.   Grossly stable 1.6 cm diameter cystic and solid left thyroid lobe nodule with partially obscured borders, defer to prior FNA results.  -01/31/2023: Restaging whole-body nuclear medicine bone scan pelvic comparison: CT C/A/P 0 01/09/2023):  No bone metastasis  -02/02/2023:  CT-guided right liver biopsy: Pathology:  Metastatic carcinoma, consistent with a breast primary  -01/23/2023:  CEA 5.10, elevated; CA 27.29 level 49.1, elevated; CA 15-3 level 22, normal     Interval History 4/27/2023: Patient presents today alone  for scheduled follow up for metastatic breast cancer. She is due to receive cycle 3 day 1 of Halaven today. She reports feeling well today. She reports chronic stable symptoms of SOB and numbness  and tingling in hands.She completed  CT scans which showed improvement. Results reviewed with patient. She denies any confusion, unusual headaches, seizure activity, vision changes, neurological changes, new or worsening lumps/masses, chest pain, blood in urine/stool, fever or signs of infection. Lab work reviewed with patient, CrCl 44.6  Halaven has already been dose reduced to 1.1 will continue to monitor.  Discussed follow up appointments with patient.       Review of Systems:   All systems reviewed and found to be negative except for the symptoms detailed above    Lab Results   Component Value Date    WBC 13.6 (H) 04/27/2023    RBC 3.34 (L) 04/27/2023    HGB 11.6 (L) 04/27/2023    HCT 35.8 (L) 04/27/2023    .2 (H) 04/27/2023    MCH 34.7 (H) 04/27/2023    MCHC 32.4 (L) 04/27/2023    RDW 18.1 (H) 04/27/2023     (L) 04/27/2023    MPV 10.4 04/27/2023     CMP  Sodium Level   Date Value Ref Range Status   04/13/2023 140 136 - 145 mmol/L Final     Potassium Level   Date Value Ref Range Status   04/13/2023 4.4 3.5 - 5.1 mmol/L Final     Carbon Dioxide   Date Value Ref Range Status   04/13/2023 24 23 - 31 mmol/L Final     Blood Urea Nitrogen   Date Value Ref Range Status   04/13/2023 12.4 9.8 - 20.1 mg/dL Final     Creatinine   Date Value Ref Range Status   04/13/2023 0.94 0.55 - 1.02 mg/dL Final     Calcium Level Total   Date Value Ref Range Status   04/13/2023 9.2 8.4 - 10.2 mg/dL Final     Albumin Level   Date Value Ref Range Status   04/13/2023 2.9 (L) 3.4 - 4.8 g/dL Final     Bilirubin Total   Date Value Ref Range Status   04/13/2023 0.9 <=1.5 mg/dL Final     Alkaline Phosphatase   Date Value Ref Range Status   04/13/2023 183 (H) 40 - 150 unit/L Final     Aspartate Aminotransferase   Date Value Ref Range Status   04/13/2023 47 (H) 5 - 34 unit/L Final     Alanine Aminotransferase   Date Value Ref Range Status   04/13/2023 9 0 - 55 unit/L Final     eGFR   Date Value Ref Range Status   04/13/2023 >60  mls/min/1.73/m2 Final       Physical Examination:   VITAL SIGNS:   Vitals:    04/27/23 0908   BP: 106/70   Pulse: 79   Resp: 20   Temp: 98 °F (36.7 °C)     General: Alert and oriented. NAD, in wheelchair   Eye: Pupils are equal, round and reactive to light, Extraocular movements are intact. Normal conjunctiva  HENT: Normocephalic. Oropharynx exam deferred; mask in place due to coronavirus  Neck: Supple, Non-tender  Respiratory: Respirations are non-labored, Symmetrical chest wall expansion. Breath sounds CTA bilaterally  Cardiovascular: Regular rate, rhythm, Normal peripheral perfusion, No bilateral lower extremity edema  Gastrointestinal: Non-distended, Present bowel sounds   Genitourinary: Exam deferred  Lymphatics: No lymphadenopathy appreciated  Musculoskeletal: Moves all extremities  Integumentary: Intact. Warm, dry. No rashes, or lesions to visible skin  Neurologic: No focal deficits  Psychiatric: Cooperative. Appropriate mood and affect   ECOG Performance Scale: 2 - Capable of all self-care but unable to carry out any work activities. Up and about greater than 50 percent of waking hours.       Assessment:  \IDC right breast, ER low pos (1%), IN negative (0%), HER2 negative (practically triple negative):  -IDC right breast, high-grade, practically triple negative (ER 1%, low +; IN negative; HER2 2+, equivocal; HER2 negative by FISH)  -right breast mass initially noted 02/2020; she did not follow-up;   -biopsy after 2 years (01/07/2022)  -ER low + (1%).  IN negative (0%).  HER2 negative.  Ki-67 high proliferation (50%).  -3.1 cm mass on mammogram.  Grade 3. Right axillary lymph node biopsy +  -cT2 cN1 MX, grade 3, practically triple negative, AJCC anatomic stage IIB, clinical prognostic stage IIIB  -S/P neoadjuvant ddAC x4 (04/13/2022-06/02/2022), with some progression on restaging mammogram and ultrasound 06/17/2022  -S/P neoadjuvant dose dense Taxol x4 (08/31/2022-10/12/2022)  >> progression of right breast  lesion but right axillary lymph node smaller on restaging bilateral diagnostic mammogram with contrast and limited ultrasound right breast 11/04/2022  >>>  Radiologic progression post neoadjuvant chemotherapy, with worsening liver metastases (triple negative):  -suspicion of small liver metastases on PET-CT 09/19/2022 and MRI abdomen 10/21/2022 (1.9 x 1.2 cm on PET-CT but 9 mm on MRI); per IR, too small for biopsy  -01/04/2023: Says that the lump in right breast is back  -disease progression on restaging CTs C/A/P 01/09/2023 (enlargement of right breast mass and right axillary lymph node; increase in size and number of liver metastasis, largest mass 7.3 x 7.1 cm, previously 2 cm)  -01/23/2023: No significant symptoms; ECOG 1  -01/23/2023:  CEA 5.10, elevated; CA 27.29 level 49.1, elevated; CA 15-3 level 22, normal  -no bone metastases on bone scan 01/31/2023  -CT-guided liver biopsy 02/02/2023: Metastatic carcinoma, consistent with breast primary; ER negative (0%); HI negative (0%); HER2 negative (0%) Ki-67 borderline proliferation (18%)  -liver biopsy 02/02/2023:  No PD-L1 expression for Keytruda ; NGS study nondiagnostic due to inability to [place] 5 DNA/RNA   -progression of metastasis on CTs C/A/P 0 02/17/2023, performed before starting eribulin  -eribulin started 02/23/2023 (dose reduced to 1.1 mg per m2 due to reduced creatinine clearance)  -brain metastases noted on brain MRI 03/03/2023; no vasogenic edema  -01/30/2023:  BRCA1/2 analysis with [facility]:  Carrier: 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  OR negative (0%).  HER2 equivocal (2+).  HER2 negative. 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 Multinodular thyroid.  2. The dominant right upper thyroid pole nodule previously demonstrated hypermetabolic activity, warranting biopsy.  3. Dominant central left thyroid nodule meets biopsy criteria.  4. Additional nodules do not meet biopsy criteria secondary to insufficient size/sonographic characteristics; recommendations for ultrasound surveillance provided above.  -12/08/2022:  Ultrasound-guided biopsy, bilateral (FNA): Pathology:  1. Fine needle aspiration of left thyroid nodule: Unsatisfactory. Insufficient cells for diagnostic evaluation.   2. Fine needle aspiration of right thyroid nodule: Cytomorphology  of a benign thyroid nodule.        History of right lower extremity DVT 02/19/2020:  -02/19/2020: (Right leg swelling for 3 days) acute nonocclusive DVT in the right distal femoral vein; acute occlusive DVTs right popliteal and right peroneal veins  -Apparently, at the same time, was also noted to have a breast lump  Which could only be biopsied on  01/07/2022, 2 years later, because of for noncompliance with follow-up (right breast, 3:00, a small lump, nontender, slightly irregular on palpation, not hard or fluctuant, no erythema) (apparently, [facility] ambulatory clinics tried to contact her but without any response )    Plan:  -eribulin started 02/23/2023 (dose reduced to 1.1 mg per m2 due to reduced creatinine clearance)  -brain metastases noted on brain MRI 03/03/2023; no vasogenic edema  -pending: Liquid biopsy  >>>  Eribulin:  Schedule:  Days 1 and 8: Eribulin 1.4 mg/m² IV push  Repeat cycle every 3 weeks  IV infusion over 2-5 minutes      Liver metastases are triple negative (ER negative, MD negative, HER2 negative) (HER2 0%)  Liver biopsy (02/02/2023) study:  No PD-L1 expression for Keytruda based on both CPS (< 1%) and TPS (<1)  Therefore, not a candidate for treatment with pembrolizumab/chemotherapy  Since PD-L1 CPS <10 (< 1%), and since germline BRCA1/2 mutation negative, therefore, recommendation is to treat with chemotherapy  In second-line, sacituzumab govitecan (category 1, Preferred), or systemic chemotherapy  HER2 negative (0%), therefore, not a candidate for Fam-trastuzumab deruxtecan  Triple negative status of liver metastases was known only after we started the patient on eribulin  Brain metastasis; has been referred to Radiation Oncology for palliative radiotherapy; corticosteroids not indicated (no vasogenic edema)  Genetic testing essentially negative  Please order liquid biopsy (NGS testing on liver biopsy specimen was not feasible): Pending  Will avoid Adriamycin, Cytoxan, and Taxol because patient progressed on neoadjuvant ddAC and neoadjuvant Taxol  Continue eribulin  From cycle 2 of eribulin, will add Neulasta support (because, developed severe neutropenia 7 days after day 1 cycle 1 of eribulin      -Okay to proceed with cycle 3 day 1 of Eribulin today  -Return in 1 week for cycle 3 day 8 Eribulin with lab work (cbc,cmp,mg)  then return  on day 9 for G-CSF injection  -Follow up with  in 3 weeks with lab work (cbc,cmp,mg) prior to cycle 4 day 1 Erbulin and to review scans  -Check tumor markers including CEA, CA 15-3, and CA 27.29 level every month to assess response  -Re-stage with contrast enhanced CT scans of C/A/P 2 months (around 04/23/2023) after starting eribulin (02/23/2023)  -Continue Norco for pain in right breast and in the back  -Continue Megace for appetite       Warnings/precautions with eribulin:  1.  Bone marrow suppression.  Severe neutropenia and neutropenic fever.  2.  Peripheral neuropathy  3.  QT prolongation     Adverse reactions with eribulin:  >10%  Peripheral edema, fatigue, peripheral neuropathy, headache, alopecia, hypokalemia, hypocalcemia, nausea, constipation, abdominal pain, neutropenia, anemia, increased ALT, increased AST, etc.       Above discussed at length with the patient.  All questions answered.    Discussed labs and scans  Have already explained and reinforced that she has stage IV disease which is incurable but we can attempt palliation with systemic chemotherapy; response can not be guaranteed and long-term prognosis is guarded.    She understands and agrees with this plan.

## 2023-05-04 ENCOUNTER — TELEPHONE (OUTPATIENT)
Dept: HEMATOLOGY/ONCOLOGY | Facility: CLINIC | Age: 68
End: 2023-05-04
Payer: MEDICAID

## 2023-05-04 ENCOUNTER — INFUSION (OUTPATIENT)
Dept: INFUSION THERAPY | Facility: HOSPITAL | Age: 68
End: 2023-05-04
Attending: INTERNAL MEDICINE
Payer: MEDICAID

## 2023-05-04 VITALS
TEMPERATURE: 97 F | BODY MASS INDEX: 20.18 KG/M2 | HEART RATE: 88 BPM | DIASTOLIC BLOOD PRESSURE: 61 MMHG | SYSTOLIC BLOOD PRESSURE: 94 MMHG | HEIGHT: 62 IN | WEIGHT: 109.63 LBS | OXYGEN SATURATION: 100 %

## 2023-05-04 DIAGNOSIS — E87.6 HYPOKALEMIA: ICD-10-CM

## 2023-05-04 DIAGNOSIS — D70.2 DRUG-INDUCED NEUTROPENIA: ICD-10-CM

## 2023-05-04 DIAGNOSIS — C50.911 MALIGNANT NEOPLASM OF RIGHT FEMALE BREAST, UNSPECIFIED ESTROGEN RECEPTOR STATUS, UNSPECIFIED SITE OF BREAST: Primary | ICD-10-CM

## 2023-05-04 RX ORDER — POTASSIUM CHLORIDE 20 MEQ/1
20 TABLET, EXTENDED RELEASE ORAL 2 TIMES DAILY
Qty: 14 TABLET | Refills: 0 | Status: SHIPPED | OUTPATIENT
Start: 2023-05-04 | End: 2023-05-11

## 2023-05-04 RX ORDER — CIPROFLOXACIN 500 MG/1
500 TABLET ORAL 2 TIMES DAILY
Qty: 14 TABLET | Refills: 0 | Status: SHIPPED | OUTPATIENT
Start: 2023-05-04 | End: 2023-05-11

## 2023-05-04 NOTE — PROGRESS NOTES
today, She is due for day 8 Halaven  Hold day 8 today, Return in 1 week with repeat lab work and possible day 8 halaven  Start Cipro 500mg po bid x7 days-rx sent to pharmacy   Neutropenic precautions    Potassium level low at 3.3   Start KCL 20meq po bid x7 days-rx sent to pharmacy  Will reassess potasium level at next lab draw in 1 week

## 2023-05-04 NOTE — NURSING
12:03 Patient is here for labs/Halaven. ANC today is 0.684. Patient stated she had 2 diarrhea stools after she arrived for appt. Spoke w/Yessy Marsh NP regarding pt c/o diarrhea & ANC results. Yessy gave order to hold chemo today and return in 1 wk for labs/chemo and rx for Cipro called into patients pharmacy. Reviewed Neutropenic precautions with patient.

## 2023-05-11 ENCOUNTER — INFUSION (OUTPATIENT)
Dept: INFUSION THERAPY | Facility: HOSPITAL | Age: 68
End: 2023-05-11
Attending: INTERNAL MEDICINE
Payer: MEDICAID

## 2023-05-11 VITALS
HEIGHT: 62 IN | BODY MASS INDEX: 20.13 KG/M2 | RESPIRATION RATE: 18 BRPM | SYSTOLIC BLOOD PRESSURE: 102 MMHG | DIASTOLIC BLOOD PRESSURE: 64 MMHG | WEIGHT: 109.38 LBS | HEART RATE: 72 BPM | TEMPERATURE: 98 F

## 2023-05-11 DIAGNOSIS — R59.0 AXILLARY LYMPHADENOPATHY: ICD-10-CM

## 2023-05-11 DIAGNOSIS — C78.7 MALIGNANT NEOPLASM METASTATIC TO LIVER: Primary | ICD-10-CM

## 2023-05-11 DIAGNOSIS — C50.011 MALIGNANT NEOPLASM OF NIPPLE OF RIGHT BREAST IN FEMALE, UNSPECIFIED ESTROGEN RECEPTOR STATUS: ICD-10-CM

## 2023-05-11 PROCEDURE — 96409 CHEMO IV PUSH SNGL DRUG: CPT

## 2023-05-11 PROCEDURE — 25000003 PHARM REV CODE 250: Performed by: INTERNAL MEDICINE

## 2023-05-11 PROCEDURE — 63600175 PHARM REV CODE 636 W HCPCS: Mod: JZ,JG | Performed by: INTERNAL MEDICINE

## 2023-05-11 PROCEDURE — 96375 TX/PRO/DX INJ NEW DRUG ADDON: CPT

## 2023-05-11 RX ORDER — SODIUM CHLORIDE 0.9 % (FLUSH) 0.9 %
10 SYRINGE (ML) INJECTION
Status: DISCONTINUED | OUTPATIENT
Start: 2023-05-11 | End: 2023-05-11 | Stop reason: HOSPADM

## 2023-05-11 RX ORDER — ONDANSETRON 2 MG/ML
8 INJECTION INTRAMUSCULAR; INTRAVENOUS
Status: COMPLETED | OUTPATIENT
Start: 2023-05-11 | End: 2023-05-11

## 2023-05-11 RX ORDER — HEPARIN 100 UNIT/ML
500 SYRINGE INTRAVENOUS
Status: DISCONTINUED | OUTPATIENT
Start: 2023-05-11 | End: 2023-05-11 | Stop reason: HOSPADM

## 2023-05-11 RX ADMIN — ERIBULIN MESYLATE 1.6 MG: 0.5 INJECTION INTRAVENOUS at 10:05

## 2023-05-11 RX ADMIN — ONDANSETRON 8 MG: 2 INJECTION INTRAMUSCULAR; INTRAVENOUS at 10:05

## 2023-05-12 ENCOUNTER — INFUSION (OUTPATIENT)
Dept: INFUSION THERAPY | Facility: HOSPITAL | Age: 68
End: 2023-05-12
Attending: INTERNAL MEDICINE
Payer: MEDICAID

## 2023-05-12 VITALS
RESPIRATION RATE: 20 BRPM | SYSTOLIC BLOOD PRESSURE: 100 MMHG | OXYGEN SATURATION: 100 % | TEMPERATURE: 98 F | DIASTOLIC BLOOD PRESSURE: 56 MMHG | HEART RATE: 80 BPM

## 2023-05-12 DIAGNOSIS — C78.7 MALIGNANT NEOPLASM METASTATIC TO LIVER: Primary | ICD-10-CM

## 2023-05-12 DIAGNOSIS — R59.0 AXILLARY LYMPHADENOPATHY: ICD-10-CM

## 2023-05-12 DIAGNOSIS — C50.011 MALIGNANT NEOPLASM OF NIPPLE OF RIGHT BREAST IN FEMALE, UNSPECIFIED ESTROGEN RECEPTOR STATUS: ICD-10-CM

## 2023-05-12 PROCEDURE — 63600175 PHARM REV CODE 636 W HCPCS: Mod: JZ,JG | Performed by: INTERNAL MEDICINE

## 2023-05-12 PROCEDURE — 96372 THER/PROPH/DIAG INJ SC/IM: CPT

## 2023-05-12 RX ADMIN — PEGFILGRASTIM 6 MG: 6 INJECTION SUBCUTANEOUS at 10:05

## 2023-05-17 ENCOUNTER — OFFICE VISIT (OUTPATIENT)
Dept: HEMATOLOGY/ONCOLOGY | Facility: CLINIC | Age: 68
End: 2023-05-17
Attending: INTERNAL MEDICINE
Payer: MEDICAID

## 2023-05-17 VITALS
TEMPERATURE: 98 F | BODY MASS INDEX: 19.69 KG/M2 | RESPIRATION RATE: 20 BRPM | DIASTOLIC BLOOD PRESSURE: 55 MMHG | HEIGHT: 62 IN | OXYGEN SATURATION: 99 % | WEIGHT: 107 LBS | HEART RATE: 85 BPM | SYSTOLIC BLOOD PRESSURE: 93 MMHG

## 2023-05-17 DIAGNOSIS — R63.0 ANOREXIA: ICD-10-CM

## 2023-05-17 DIAGNOSIS — E04.2 MULTINODULAR GOITER: ICD-10-CM

## 2023-05-17 DIAGNOSIS — C50.911 MALIGNANT NEOPLASM OF RIGHT FEMALE BREAST, UNSPECIFIED ESTROGEN RECEPTOR STATUS, UNSPECIFIED SITE OF BREAST: Primary | ICD-10-CM

## 2023-05-17 DIAGNOSIS — C78.7 MALIGNANT NEOPLASM METASTATIC TO LIVER: ICD-10-CM

## 2023-05-17 DIAGNOSIS — Z15.89 MONOALLELIC MUTATION OF MUTYH GENE: ICD-10-CM

## 2023-05-17 DIAGNOSIS — C50.911 RECURRENT BREAST CANCER, RIGHT: Primary | ICD-10-CM

## 2023-05-17 DIAGNOSIS — R59.0 AXILLARY LYMPHADENOPATHY: ICD-10-CM

## 2023-05-17 DIAGNOSIS — Z86.718 HISTORY OF DEEP VENOUS THROMBOSIS (DVT) OF DISTAL VEIN OF RIGHT LOWER EXTREMITY: ICD-10-CM

## 2023-05-17 DIAGNOSIS — T45.1X5A CHEMOTHERAPY-INDUCED NEUROPATHY: ICD-10-CM

## 2023-05-17 DIAGNOSIS — G62.0 CHEMOTHERAPY-INDUCED NEUROPATHY: ICD-10-CM

## 2023-05-17 DIAGNOSIS — C79.31 MALIGNANT NEOPLASM METASTATIC TO BRAIN: ICD-10-CM

## 2023-05-17 DIAGNOSIS — C50.911 MALIGNANT NEOPLASM OF RIGHT FEMALE BREAST, UNSPECIFIED ESTROGEN RECEPTOR STATUS, UNSPECIFIED SITE OF BREAST: ICD-10-CM

## 2023-05-17 PROCEDURE — 1160F RVW MEDS BY RX/DR IN RCRD: CPT | Mod: CPTII,,, | Performed by: INTERNAL MEDICINE

## 2023-05-17 PROCEDURE — 99214 PR OFFICE/OUTPT VISIT, EST, LEVL IV, 30-39 MIN: ICD-10-PCS | Mod: S$PBB,,, | Performed by: INTERNAL MEDICINE

## 2023-05-17 PROCEDURE — 3078F PR MOST RECENT DIASTOLIC BLOOD PRESSURE < 80 MM HG: ICD-10-PCS | Mod: CPTII,,, | Performed by: INTERNAL MEDICINE

## 2023-05-17 PROCEDURE — 1126F AMNT PAIN NOTED NONE PRSNT: CPT | Mod: CPTII,,, | Performed by: INTERNAL MEDICINE

## 2023-05-17 PROCEDURE — 3074F SYST BP LT 130 MM HG: CPT | Mod: CPTII,,, | Performed by: INTERNAL MEDICINE

## 2023-05-17 PROCEDURE — 3078F DIAST BP <80 MM HG: CPT | Mod: CPTII,,, | Performed by: INTERNAL MEDICINE

## 2023-05-17 PROCEDURE — 1160F PR REVIEW ALL MEDS BY PRESCRIBER/CLIN PHARMACIST DOCUMENTED: ICD-10-PCS | Mod: CPTII,,, | Performed by: INTERNAL MEDICINE

## 2023-05-17 PROCEDURE — 99213 OFFICE O/P EST LOW 20 MIN: CPT | Mod: PBBFAC | Performed by: INTERNAL MEDICINE

## 2023-05-17 PROCEDURE — 3288F PR FALLS RISK ASSESSMENT DOCUMENTED: ICD-10-PCS | Mod: CPTII,,, | Performed by: INTERNAL MEDICINE

## 2023-05-17 PROCEDURE — 3074F PR MOST RECENT SYSTOLIC BLOOD PRESSURE < 130 MM HG: ICD-10-PCS | Mod: CPTII,,, | Performed by: INTERNAL MEDICINE

## 2023-05-17 PROCEDURE — 1159F PR MEDICATION LIST DOCUMENTED IN MEDICAL RECORD: ICD-10-PCS | Mod: CPTII,,, | Performed by: INTERNAL MEDICINE

## 2023-05-17 PROCEDURE — 1101F PR PT FALLS ASSESS DOC 0-1 FALLS W/OUT INJ PAST YR: ICD-10-PCS | Mod: CPTII,,, | Performed by: INTERNAL MEDICINE

## 2023-05-17 PROCEDURE — 3288F FALL RISK ASSESSMENT DOCD: CPT | Mod: CPTII,,, | Performed by: INTERNAL MEDICINE

## 2023-05-17 PROCEDURE — 3008F PR BODY MASS INDEX (BMI) DOCUMENTED: ICD-10-PCS | Mod: CPTII,,, | Performed by: INTERNAL MEDICINE

## 2023-05-17 PROCEDURE — 1159F MED LIST DOCD IN RCRD: CPT | Mod: CPTII,,, | Performed by: INTERNAL MEDICINE

## 2023-05-17 PROCEDURE — 1126F PR PAIN SEVERITY QUANTIFIED, NO PAIN PRESENT: ICD-10-PCS | Mod: CPTII,,, | Performed by: INTERNAL MEDICINE

## 2023-05-17 PROCEDURE — 3008F BODY MASS INDEX DOCD: CPT | Mod: CPTII,,, | Performed by: INTERNAL MEDICINE

## 2023-05-17 PROCEDURE — 99214 OFFICE O/P EST MOD 30 MIN: CPT | Mod: S$PBB,,, | Performed by: INTERNAL MEDICINE

## 2023-05-17 PROCEDURE — 1101F PT FALLS ASSESS-DOCD LE1/YR: CPT | Mod: CPTII,,, | Performed by: INTERNAL MEDICINE

## 2023-05-17 RX ORDER — SODIUM CHLORIDE 0.9 % (FLUSH) 0.9 %
10 SYRINGE (ML) INJECTION
Status: CANCELLED | OUTPATIENT
Start: 2023-06-19

## 2023-05-17 RX ORDER — HEPARIN 100 UNIT/ML
500 SYRINGE INTRAVENOUS
Status: CANCELLED | OUTPATIENT
Start: 2023-06-26

## 2023-05-17 RX ORDER — ONDANSETRON 2 MG/ML
8 INJECTION INTRAMUSCULAR; INTRAVENOUS
Status: CANCELLED | OUTPATIENT
Start: 2023-06-01

## 2023-05-17 RX ORDER — POTASSIUM CHLORIDE 20 MEQ/1
20 TABLET, EXTENDED RELEASE ORAL DAILY
Qty: 14 TABLET | Refills: 0 | Status: SHIPPED | OUTPATIENT
Start: 2023-05-17 | End: 2023-05-31

## 2023-05-17 RX ORDER — HEPARIN 100 UNIT/ML
500 SYRINGE INTRAVENOUS
Status: CANCELLED | OUTPATIENT
Start: 2023-06-01

## 2023-05-17 RX ORDER — ONDANSETRON 2 MG/ML
8 INJECTION INTRAMUSCULAR; INTRAVENOUS
Status: CANCELLED | OUTPATIENT
Start: 2023-06-19

## 2023-05-17 RX ORDER — ONDANSETRON 2 MG/ML
8 INJECTION INTRAMUSCULAR; INTRAVENOUS
Status: CANCELLED | OUTPATIENT
Start: 2023-06-26

## 2023-05-17 RX ORDER — ONDANSETRON 2 MG/ML
8 INJECTION INTRAMUSCULAR; INTRAVENOUS
Status: CANCELLED | OUTPATIENT
Start: 2023-05-25

## 2023-05-17 RX ORDER — HEPARIN 100 UNIT/ML
500 SYRINGE INTRAVENOUS
Status: CANCELLED | OUTPATIENT
Start: 2023-05-25

## 2023-05-17 RX ORDER — SODIUM CHLORIDE 0.9 % (FLUSH) 0.9 %
10 SYRINGE (ML) INJECTION
Status: CANCELLED | OUTPATIENT
Start: 2023-06-26

## 2023-05-17 RX ORDER — HEPARIN 100 UNIT/ML
500 SYRINGE INTRAVENOUS
Status: CANCELLED | OUTPATIENT
Start: 2023-06-19

## 2023-05-17 RX ORDER — SODIUM CHLORIDE 0.9 % (FLUSH) 0.9 %
10 SYRINGE (ML) INJECTION
Status: CANCELLED | OUTPATIENT
Start: 2023-06-01

## 2023-05-17 RX ORDER — SODIUM CHLORIDE 0.9 % (FLUSH) 0.9 %
10 SYRINGE (ML) INJECTION
Status: CANCELLED | OUTPATIENT
Start: 2023-05-25

## 2023-05-17 NOTE — PROGRESS NOTES
Past Medical History:   Diagnosis Date    Breast cancer     Liver metastases    Past medical history: Obesity.  Tobacco abuse.   -2020: Acute nonocclusive DVT in the right distal femoral vein; acute occlusive DVTs right popliteal and right peroneal veins ((she never followed up on that)  Procedure/surgical history: Cholecystectomy ().  Bilateral tubal ligation (according to her).  Social history: .  Lives in [place], [place].  Has 2 children.  Children.  Does not work.  Smoked half a pack of cigarettes daily for 40 years; quit 2 weeks ago.  No alcohol or illicit drugs.  Family history: No family history of cancer.  Health maintenance: According to her, screening colonoscopy 5 years ago at [facility], probably revealing a benign polyp.  Menstrual and OB/GYN history: Menarche, age 13.  Menopause, age 59.  G2, P2.  No abortions or miscarriages.  Age at delivery of first child, 22.  Did not breast-feed her children.  Used birth control pills for 1 year, subsequently, bilateral tubal ligation.  No history of hormone replacement therapy.  Past Surgical History:   Procedure Laterality Date    BREAST BIOPSY      CHOLECYSTECTOMY        Social History     Socioeconomic History    Marital status:     Number of children: 2   Occupational History    Occupation: retired   Tobacco Use    Smoking status: Former     Packs/day: 0.25     Years: 40.00     Pack years: 10.00     Types: Cigarettes     Quit date: 3/5/2022     Years since quittin.2    Smokeless tobacco: Never   Substance and Sexual Activity    Alcohol use: Not Currently    Drug use: Never    Sexual activity: Yes     Partners: Male      Family History   Problem Relation Age of Onset    Cancer Neg Hx         Reason for Follow-up:  -IDC right breast, axillary lymph node biopsy pos, presumed triple negative, AJCC stage IIB, clinical prognostic stage IIIB  -liver metastases; adrenal lesions  -brain metastasis  -Monoallelic mutation of MUTYH  gene  -History of right lower extremity DVT    -right thyroid lesion     History of Present Illness:   Breast Cancer    Oncologic/Hematologic History:  Oncology History   Breast cancer, right   1/7/2022 Cancer Staged    Staging form: Breast, AJCC 8th Edition  - Clinical stage from 1/7/2022: Stage IIIA (cT2, cN1, cM0, G3, ER+, SD-, HER2-)       4/7/2022 Initial Diagnosis    Breast cancer       4/13/2022 - 6/3/2022 Chemotherapy    Treatment Summary   Plan Name: OP BREAST DOSE-DENSE AC - DOXORUBICIN CYCLOPHOSPHAMIDE Q2W  Treatment Goal: Control  Status: Inactive  Start Date: 4/13/2022  End Date: 6/3/2022  Provider: Gurmeet Hatfield MD  Chemotherapy: DOXOrubicin chemo injection 112 mg, 60 mg/m2 = 112 mg, Intravenous, Clinic/HOD 1 time, 4 of 4 cycles  Administration: 112 mg (5/5/2022), 112 mg (5/19/2022), 112 mg (6/2/2022)  cyclophosphamide 600 mg/m2 = 1,120 mg in sodium chloride 0.9% 250 mL chemo infusion, 600 mg/m2 = 1,120 mg, Intravenous, Clinic/HOD 1 time, 4 of 4 cycles  Administration: 1,100 mg (5/5/2022), 1,120 mg (5/19/2022), 1,100 mg (6/2/2022)       8/31/2022 - 10/13/2022 Chemotherapy    Treatment Summary   Plan Name: OP BREAST PACLITAXEL Q2W  Treatment Goal: Control  Status: Inactive  Start Date: 8/31/2022  End Date: 10/13/2022  Provider: Gurmeet Hatfield MD  Chemotherapy: PACLitaxeL (TAXOL) 175 mg/m2 = 312 mg in sodium chloride 0.9% 500 mL chemo infusion, 175 mg/m2 = 312 mg, Intravenous, Clinic/HOD 1 time, 4 of 4 cycles  Administration: 312 mg (8/31/2022), 312 mg (9/14/2022), 312 mg (9/28/2022), 312 mg (10/12/2022)       2/23/2023 -  Chemotherapy    Treatment Summary   Plan Name: OP ERIBULIN Q3W  Treatment Goal: Palliative  Status: Active  Start Date: 2/23/2023  End Date: 11/17/2023 (Planned)  Provider: Gurmeet Hatfield MD  Chemotherapy: eriBULin (HALAVEN) 1.75 mg in sodium chloride 0.9% 118.5 mL IVPB, 1.1 mg/m2 = 1.75 mg (78.6 % of original dose 1.4 mg/m2), Intravenous, Clinic/HOD 1 time, 3 of 12  cycles  Dose modification: 1.1 mg/m2 (original dose 1.4 mg/m2, Cycle 1, Reason: MD Discretion, Comment: Renal dose), 1.1 mg/m2 (original dose 1.4 mg/m2, Cycle 1, Reason: MD Discretion)  Administration: 1.75 mg (2/23/2023), 1.75 mg (3/9/2023), 1.75 mg (3/30/2023), 1.7 mg (4/13/2023), 1.65 mg (4/27/2023), 1.6 mg (5/11/2023)       Liver metastasis   2/8/2023 Initial Diagnosis    Liver metastasis       2/23/2023 -  Chemotherapy    Treatment Summary   Plan Name: OP ERIBULIN Q3W  Treatment Goal: Palliative  Status: Active  Start Date: 2/23/2023  End Date: 11/17/2023 (Planned)  Provider: Gurmeet Hatfield MD  Chemotherapy: eriBULin (HALAVEN) 1.75 mg in sodium chloride 0.9% 118.5 mL IVPB, 1.1 mg/m2 = 1.75 mg (78.6 % of original dose 1.4 mg/m2), Intravenous, Clinic/HOD 1 time, 3 of 12 cycles  Dose modification: 1.1 mg/m2 (original dose 1.4 mg/m2, Cycle 1, Reason: MD Discretion, Comment: Renal dose), 1.1 mg/m2 (original dose 1.4 mg/m2, Cycle 1, Reason: MD Discretion)  Administration: 1.75 mg (2/23/2023), 1.75 mg (3/9/2023), 1.75 mg (3/30/2023), 1.7 mg (4/13/2023), 1.65 mg (4/27/2023), 1.6 mg (5/11/2023)        66-year-old lady referred from surgery, with breast cancer.     She initially presented to emergency department 11/15/2021 for evaluation of breast pain and a palpable lump in the right breast.  Subsequent imaging studies and biopsy established diagnosis of invasive ductal carcinoma of right breast, axillary lymph node positive, ER low positive, HI negative, and HER-2 negative.    Oncologic history:   #IDC right breast, presumed triple negative:   -History of right lower extremity DVT and right breast mass 02/2020; she did not follow-up   -Presentation: 11/2021: Palpable mass   -01/07/2022: Bilateral diagnostic mammogram with contrast and limited ultrasound right breast   -01/07/2022: Biopsy   -3.1 cm mass on mammogram, overall grade 3, right axillary lymph node biopsy positive   -ER low positive (1%).  HI negative (0%).   HER2 equivocal (Score 2+).  HER-2 negative by FISH.  Ki-67 high proliferation (50%)   >>>   For the purpose of neoadjuvant chemotherapy, since ER is low positive, we will treat the patient as if triple negative   >>>  -cT2 cN1 MX, grade 3, presumed triple negative, AJCC anatomic stage IIB, clinical prognostic stage IIIB   -02/22/2022: DEXA scan: Normal BMD   -02/22/2022: CT C/A/P with contrast for staging: Right breast mass with right axillary lymphadenopathy; bilateral adrenal nodules, statistically representing adenomas   -02/22/2022: Whole-body nuclear medicine bone scan: No bone metastasis   -03/02/2022: Mediport placed   -03/11/2022: TTE: LVEF 55-60%  -neoadjuvant DD AC started 04/13/2022; severe neutropenia, ANC 0.03, on 04/26/2022; therefore, cycle 2 held; treated with growth factor and prophylactic ciprofloxacin  -cycle 2 on 05/05/2022; cycle 3 on 05/19/2022; cycle 4 on 06/02/2022   -some progression on restaging mammogram and ultrasound 06/17/2022  -06/21/2022:  She was supposed to start neoadjuvant dose dense Taxol but was sent to emergency department for evaluation because she was feeling extremely weak, sick, unable to walk for 2-3 weeks  -06/21/2022:  WBC, differential unremarkable.  Hemoglobin 9.2, stable.  Platelets 210 K. ANC 5.6.  CMP stable and within acceptable limits.  Magnesium normal.  BNP normal.  TSH 2.2180, normal.    -06/21/2022:  V/Q scan:  Normal/very low probability of pulmonary embolism  -07/15/2022:  CT C/A/P with contrast (comparison:  02/22/2022):   Mass in the right breast with associated skin thickening in the right breast overall slightly less prominent than the prior examination.  The right axillary lymphadenopathy is also less prominent than the prior examination   Interval development of multiple punctate hypoattenuating areas within the liver concerning for possible developing metastatic foci.  Short-term follow-up is recommended.  These nodules are too small to characterize  and are all subcentimeter in size.   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  CA 27.29 level normal.  CA 15-3 level normal.  -neoadjuvant dose dense Taxol started 08/31/2022  -neoadjuvant dose dense Taxol: Cycle 1 (08/31/2022); cycle 2 (09/14/2022)  -09/09/2022: Whole-body nuclear medicine bone scan (comparison:  Bone scan 02/22/2022; CT C/A/P 0 07/15/2022): No bone metastasis  -09/19/2022: PET-CT to rule out metastatic disease (comparison:  09/09/2022 bone scan; 07/15/2022 CT C/A/P):  1. Slight interval enlargement of hypermetabolic right breast mass and associated right axillary node.  2. Metastatic right hepatic lesion (central aspect of right liver, 1.9 x 1.2 cm), with no other definite FDG-avid or aggressive appearing process through the neck, chest, abdomen, or pelvis.  3. Subcentimeter [place] foci are less conspicuous and again of limited characterization due to size and non-contrast protocol, but statistically favored to represent benign cysts.  Close attention on surveillance imaging is needed in order to ensure ongoing stability.  4. Incidental right adrenal adenoma (1.7 x 4.3 cm)  5. Diffuse osseous uptake is favored to reflect sequela of systemic therapeutic agent.  6. Hypermetabolic low-density nodule posterior right hemithyroid, 1.6 x 1.4 cm, maximum SUV 4.6,  every -neoadjuvant dose dense Taxol:  Cycle 3 (09/28/2022); cycle 4 (10/12/2022)  -10/17/2022:  Thyroid ultrasound:  1. Multinodular thyroid.  2. The dominant right upper thyroid pole nodule previously demonstrated hypermetabolic activity, warranting biopsy.  3. Dominant central left thyroid nodule meets biopsy criteria.  4. Additional nodules do not meet biopsy criteria secondary to insufficient size/sonographic characteristics; recommendations for ultrasound surveillance provided above.  -10/21/2022:  MRI abdomen with and without contrast (comparison:  09/19/2022):  1. There are 2 small right hepatic lobe lesions suspicious for metastases.  One of these was hypermetabolic on recent PET-CT.  Right lobe lesion 9 mm, corresponding to hypermetabolic lesion on PET-CT.  More inferiorly in right hepatic lobe 6 mm, this was not evident on PET  2. Bilateral adrenal adenomas show no significant change going back to February 2022.  -11/04/2022: IR:  Liver lesion too small for biopsy  -11/04/2022:  Restaging bilateral diagnostic mammogram with contrast and limited ultrasound right breast (comparison:  06/17/2022):  Right breast mass 3.8 x 2.8 x 3.4 cm, previously 3.2 x 3.1 x 3.6 cm (06/17/2022) and 3 x 3.1 x 2.1 cm) 12/21/2021); right axillary lymph node 2.6 x 1 x 1.4 cm, previously 3.6 x 1.3 x 1.3 cm (06/17/2022) and 3.5 x 0.9 x 1.4 cm (12/21/2021) (right breast malignancy with nipple and skin involvement; compared to 06/17/2022, there has been interval development of at least 2 adjacent satellite lesions upper outer right breast and interval progression in the associated calcifications; total satellite lesions measure 3.6 x 5.0 x 5.3 cm, previously 3.8 x 3.5 x 3 cm on 06/17/2022 and 3.5 by 2.6 x 2.8 cm on 01/07/2022)  -12/08/2022:  Ultrasound-guided biopsy, bilateral (FNA): Pathology:  1. Fine needle aspiration of left thyroid nodule: Unsatisfactory. Insufficient cells for diagnostic evaluation.   2. Fine needle aspiration  of right thyroid nodule: Cytomorphology  of a benign thyroid nodule.  -01/09/2023: Restaging CTs C/A/P with contrast (comparison:  PET-CT 09/19/2022, MRI abdomen 09/21/2022):   1. Interval progression of disease  2. Slight interval increase in size of right breast mass (4.6 x 3 cm, previously 4 x 3 cm)  3. Interval increase in size of right axillary lymph node (1.9 cm, previously 1.1 cm)  4. Interval increase in size and number of hepatic metastases (largest mass with satellite nodularity right lobe 7.3 x 7.1 cm, previously 2 cm)  -01/30/2023:  Ultrasound soft tissues of the neck pelvic comparison:  10/17/2022):   The hypermetabolic right thyroid lobe upper pole dominant 1.8 cm diameter isoechoic nodule appear slightly enlarged from prior 1.5 cm.   Grossly stable 1.6 cm diameter cystic and solid left thyroid lobe nodule with partially obscured borders, defer to prior FNA results.  -01/31/2023: Restaging whole-body nuclear medicine bone scan pelvic comparison: CT C/A/P 0 01/09/2023):  No bone metastasis  -02/02/2023:  CT-guided right liver biopsy: Pathology:  Metastatic carcinoma, consistent with a breast primary  -01/23/2023:  CEA 5.10, elevated; CA 27.29 level 49.1, elevated; CA 15-3 level 22, normal        02/10/2022:  Presents for initial medical oncology consultation, accompanied by her .     Interval History:  PORT FLUSH   OP ERIBULIN Q3W   05/17/2023:   -03/02/2023: Liquid biopsy: PIK3CA mutation pos (not relevant in this patient because liver biopsy showed triple negative breast cancer metastasis); TP53 pos; microsatellite stable BRAF mutation negative; BRCA2 1/2-; HER2 negative; NTRK1/2/3 negative; RET negative  -eribulin:  Cycle 2 started 03/30/2023; cycle 3 started 04/27/2023; day 8 cycle 3 on 05/11/2023  -04/12/2023:  Received SRS to brain metastasis (2100 cGy; 1/1 fraction)  -04/24/2023:  Restaging CTs C/A/P with contrast (post eribulin x2 cycles) (comparison:  02/17/2023):   Right breast  retroareolar mass, smaller in the interval.   Partially necrotic right axillary lymph node, slightly smaller in the interval.   Conglomerate metastatic disease right liver smaller in the interval.   Bilateral adrenal nodules, some stable in the interval.   Mild pelvic fluid, stable.  -05/11/2023: CEA 9.21, continues to drop; CA 27.29 level 61.5, continues to drop; CA 15-3 level 38, continues to drop  Presents for a follow-up visit.  Overall, stable.  No new concerns.  Generalized weakness but it is moderate in degree, stable, and tolerable.  ECOG 1.  Some numbness and tingling in hands and feet.  No unusual headaches, focal neurological symptoms, chest pain, cough, dyspnea, etc..  She feels breast tumor is shrinking.  No abdominal pain, nausea, vomiting.  No jaundice.  No fevers or chills.      Medications:  Current Outpatient Medications on File Prior to Visit   Medication Sig Dispense Refill    dexAMETHasone (DECADRON) 4 MG Tab Take 5 tablets (20 mg total) by mouth As instructed (for chemotherapy). Take 5 tablets (20 mg total) po 12 hours before treatment and then repeat 6 hours before chemotherapy treatment 30 tablet 0    HYDROcodone-acetaminophen (NORCO) 5-325 mg per tablet Take 1 tablet by mouth every 4 (four) hours as needed for Pain. 60 tablet 0    megestroL (MEGACE) 800 mg/20 mL (20 mL) Susp Take 10 mLs (800 mg total) by mouth once daily. 300 mL 2    ondansetron (ZOFRAN) 8 MG tablet Take 1 tablet (8 mg total) by mouth every 8 (eight) hours as needed for Nausea. (Patient not taking: Reported on 11/29/2022) 30 tablet 2     No current facility-administered medications on file prior to visit.       Review of Systems:   All systems reviewed and found to be negative except for the symptoms detailed above    Physical Examination:   VITAL SIGNS:   Vitals:    05/17/23 1353   BP: (!) 93/55   Pulse: 85   Resp: 20   Temp: 97.7 °F (36.5 °C)         GENERAL:  In no apparent distress.    HEAD:  No signs of head  trauma.  EYES:  Pupils are equal.  Extraocular motions intact.    EARS:  Hearing grossly intact.  MOUTH:  Oropharynx is normal.   NECK:  No adenopathy, no JVD.     CHEST:  Chest with clear breath sounds bilaterally.  No wheezes, rales, rhonchi.    CARDIAC:  Regular rate and rhythm.  S1 and S2, without murmurs, gallops, rubs.  VASCULAR:  No Edema.  Peripheral pulses normal and equal in all extremities.  ABDOMEN:  Soft, without detectable tenderness.  No sign of distention.  No   rebound or guarding, and no masses palpated.   Bowel Sounds normal.  MUSCULOSKELETAL:  Good range of motion of all major joints. Extremities without clubbing, cyanosis or edema.    NEUROLOGIC EXAM:  Alert and oriented x 3.  No focal sensory or strength deficits.   Speech normal.  Follows commands.  PSYCHIATRIC:  Mood normal.  # 03/06/2023: In a wheelchair.  In no acute discomfort.  No focal gross neurological deficit.    No results for input(s): CBC in the last 72 hours.   No results for input(s): CMP in the last 72 hours.     Assessment:  Problem List Items Addressed This Visit          Neuro    Chemotherapy-induced neuropathy       Hematology    History of deep venous thrombosis (DVT) of distal vein of right lower extremity       Oncology    Breast cancer, right    Liver metastases    Overview     IMO Regualtory Update 4/1/23           Recurrent breast cancer, right - Primary    Brain metastasis    Overview     IMO Regualtory Update 4/1/23              Endocrine    Multinodular goiter       Genetic    Monoallelic mutation of MUTYH gene       Other    Axillary lymphadenopathy    Anorexia       IDC right breast, ER low pos (1%), WI negative (0%), HER2 negative (practically triple negative):  -IDC right breast, high-grade, practically triple negative (ER 1%, low +; WI negative; HER2 2+, equivocal; HER2 negative by FISH)  -right breast mass initially noted 02/2020; she did not follow-up;   -biopsy after 2 years (01/07/2022)  -ER low + (1%).  WI  negative (0%).  HER2 negative.  Ki-67 high proliferation (50%).  -3.1 cm mass on mammogram.  Grade 3. Right axillary lymph node biopsy +  -cT2 cN1 MX, grade 3, practically triple negative, AJCC anatomic stage IIB, clinical prognostic stage IIIB  -S/P neoadjuvant ddAC x4 (04/13/2022-06/02/2022), with some progression on restaging mammogram and ultrasound 06/17/2022  -S/P neoadjuvant dose dense Taxol x4 (08/31/2022-10/12/2022)  >> progression of right breast lesion but right axillary lymph node smaller on restaging bilateral diagnostic mammogram with contrast and limited ultrasound right breast 11/04/2022  >>>  Radiologic progression post neoadjuvant chemotherapy, with worsening liver metastases (triple negative):  -suspicion of small liver metastases on PET-CT 09/19/2022 and MRI abdomen 10/21/2022 (1.9 x 1.2 cm on PET-CT but 9 mm on MRI); per IR, too small for biopsy  -01/04/2023: Says that the lump in right breast is back  -disease progression on restaging CTs C/A/P 01/09/2023 (enlargement of right breast mass and right axillary lymph node; increase in size and number of liver metastasis, largest mass 7.3 x 7.1 cm, previously 2 cm)  -01/23/2023: No significant symptoms; ECOG 1  -01/23/2023:  CEA 5.10, elevated; CA 27.29 level 49.1, elevated; CA 15-3 level 22, normal  -no bone metastases on bone scan 01/31/2023  -CT-guided liver biopsy 02/02/2023: Metastatic carcinoma, consistent with breast primary; ER negative (0%); NH negative (0%); HER2 negative (0%) Ki-67 borderline proliferation (18%)  -liver biopsy 02/02/2023:  No PD-L1 expression for Keytruda ; NGS study nondiagnostic due to inability to [place] 5 DNA/RNA   -progression of metastasis on CTs C/A/P 0 02/17/2023, performed before starting eribulin  -eribulin started 02/23/2023 (dose reduced to 1.1 mg per m2 due to reduced creatinine clearance)  -brain metastases noted on brain MRI 03/03/2023; no vasogenic edema  -01/30/2023:  BRCA1/2 analysis with [facility]:   Carrier: 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)  -eribulin cycle 1 day 8 (03/09/2023)  -cycle 2 is overdue  -03/09/2023: CEA level 17.78, declining; CA 27.29 level 134, declining; CA 15-3 level 65, declining  -03/02/2023: Liquid biopsy: PIK3CA mutation pos (not relevant in this patient because liver biopsy showed triple negative breast cancer metastasis); TP53 pos; microsatellite stable BRAF mutation negative; BRCA2 1/2-; HER2 negative; NTRK1/2/3 negative; RET negative  -eribulin:  Cycle 2 started 03/30/2023; cycle 3 started 04/27/2023; day 8 cycle 3 on 05/11/2023  -04/12/2023:  Received SRS to brain metastasis (2100 cGy; 1/1 fraction)  -pos response post eribulin x2 cycles, on restaging CTs 04/24/2023  -tumor markers continue to drop as of 05/11/2023      Sites of disease:  Enlarging right breast mass post neoadjuvant ddAC and Taxol  Right axillary lymphadenopathy  Liver metastases, largest 7.3 x 7.1 cm on CT  -brain metastases noted on brain MRI 03/03/2023; no vasogenic edema      Molecular markers:  # breast biopsy (01/07/2022): ER low + (1%).  SC negative (0%).  HER2 equivocal (2+).  HER2 negative. 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)  (Genetic testing essentially negative)  -liver biopsy (02/02/2023) study:  No PD-L1 expression for Keytruda based on both CPS (< 1%) and TPS (<1); otherwise, nondiagnostic study (unable to amplified DNA/RNA)  -03/02/2023: Liquid biopsy: PIK3CA mutation pos (not relevant in this patient because liver biopsy showed triple negative breast cancer metastasis); TP53 pos; microsatellite stable BRAF mutation  negative; BRCA2 1/2 negative; HER2 negative; NTRK1/2/3 negative; RET negative       Right hemithyroid lesion:  -PET-CT 09/19/2022: 1.6 x 1.4 cm hypermetabolic low-density nodule posterior right hemithyroid, maximum SUV 4.6, average attenuation 39 HU  -10/17/2022:  Thyroid ultrasound:  1. Multinodular thyroid.  2. The dominant right upper thyroid pole nodule previously demonstrated hypermetabolic activity, warranting biopsy.  3. Dominant central left thyroid nodule meets biopsy criteria.  4. Additional nodules do not meet biopsy criteria secondary to insufficient size/sonographic characteristics; recommendations for ultrasound surveillance provided above.  -12/08/2022:  Ultrasound-guided biopsy, bilateral (FNA): Pathology:  1. Fine needle aspiration of left thyroid nodule: Unsatisfactory. Insufficient cells for diagnostic evaluation.   2. Fine needle aspiration of right thyroid nodule: Cytomorphology  of a benign thyroid nodule.        History of right lower extremity DVT 02/19/2020:  -02/19/2020: (Right leg swelling for 3 days) acute nonocclusive DVT in the right distal femoral vein; acute occlusive DVTs right popliteal and right peroneal veins  -Apparently, at the same time, was also noted to have a breast lump  Which could only be biopsied on 01/07/2022, 2 years later, because of for noncompliance with follow-up (right breast, 3:00, a small lump, nontender, slightly irregular on palpation, not hard or fluctuant, no erythema) (apparently, [facility] ambulatory clinics tried to contact her but without any response )        Plan:  Potassium 3.4, low.    Check magnesium level   Start potassium chloride 20 mEq p.o. q.day x2 weeks; no refills   In 2 weeks, recheck CMP and magnesium level    -CT-guided liver biopsy 02/02/2023: Metastatic carcinoma, consistent with breast primary; ER negative (0%); GA negative (0%); HER2 negative (0%) Ki-67 borderline proliferation (18%)  Metastatic disease, developing after neoadjuvant ddAC  followed by neoadjuvant Taxol (completed 10/12/2022)   In this situation, no indication of surgical resection of breast mass  Sites of disease:  Enlarging right breast mass; right axillary lymphadenopathy; increasing # and size of liver metastases, largest 7.3 x 7.1 cm  -liver biopsy 02/02/2023:  No PD-L1 expression for Keytruda ; NGS study nondiagnostic due to inability to amplify DNA/RNA   -progression of metastasis on CTs C/A/P 02/17/2023, performed before starting eribulin  -eribulin started 02/23/2023 (dose reduced to 1.1 mg per m2 due to reduced creatinine clearance)  -brain metastases noted on brain MRI 03/03/2023; no vasogenic edema  -01/30/2023:  BRCA1/2 analysis with [facility]:  Carrier: 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)  -eribulin cycle 1 day 8 (03/09/2023)  -cycle 2 is overdue  -03/09/2023: CEA level 17.78, declining; CA 27.29 level 134, declining; CA 15-3 level 65, declining  -03/02/2023: Liquid biopsy: PIK3CA mutation pos (not relevant in this patient because liver biopsy showed triple negative breast cancer metastasis); TP53 pos; microsatellite stable BRAF mutation negative; BRCA2 1/2 negative; HER2 negative; NTRK1/2/3 negative; RET negative  -eribulin:  Cycle 2 started 03/30/2023; cycle 3 started 04/27/2023; day 8 cycle 3 on 05/11/2023  -04/12/2023:  Received SRS to brain metastasis (2100 cGy; 1/1 fraction)  -pos response post eribulin x2 cycles, on restaging CTs 04/24/2023  -tumor markers continue drop as of 05/11/2023, post chemotherapy x2 cycles  >>>  Liver metastases are triple negative (ER negative, ND negative, HER2 negative) (HER2 0%)  Liver biopsy (02/02/2023) study:  No PD-L1 expression for Keytruda based on both CPS (< 1%) and TPS (<1)  Therefore, not a candidate for treatment with pembrolizumab/chemotherapy combination  Since PD-L1 CPS <10 (< 1%), and since germline BRCA1/2 mutation negative, therefore, recommendation is to treat with  chemotherapy  In second-line, sacituzumab govitecan (category 1, Preferred), or systemic chemotherapy can be given  HER2 negative (0%), therefore, not a candidate for Fam-trastuzumab deruxtecan  Triple negative status of liver metastases became known only after we started the patient on eribulin    Brain metastasis; S/P SRS 04/12/2023     Genetic testing essentially negative except for 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  Results of liquid biopsy noted    Will avoid Adriamycin, Cytoxan, and Taxol because patient progressed on neoadjuvant ddAC and neoadjuvant Taxol  pos response post eribulin x2 cycles  Continue eribulin  From cycle 2 of eribulin, we have added Neulasta support (because, developed severe neutropenia 7 days after day 1 cycle 1 of eribulin  Check CBC and CMP prior to each dose of eribulin   Check tumor markers including CEA, CA 15-3, and CA 27.29 level every month to assess response (next, 06/01/2023)  Re-stage with contrast enhanced CT scans of C/A/P in 3 months (August)    Continue Norco for pain in right breast and in the back    03/06/2023: Anorexia; start Megace 800 mg p.o. q.day    Eribulin:  Schedule:  Days 1 and 8: Eribulin 1.4 mg/m² IV push  Repeat cycle every 3 weeks  IV infusion over 2-5 minutes    Warnings/precautions with eribulin:  1.  Bone marrow suppression.  Severe neutropenia and neutropenic fever.  2.  Peripheral neuropathy  3.  QT prolongation    Adverse reactions with eribulin:  >10%  Peripheral edema, fatigue, peripheral neuropathy, headache, alopecia, hypokalemia, hypocalcemia, nausea, constipation, abdominal pain, neutropenia, anemia, increased ALT, increased AST, etc.    Follow-up with NP in 2 weeks    Above discussed at length with the patient.  All questions answered.    Discussed labs and scans and gave her copies of relevant records.    Plan of management discussed once again.  Have already explained and reinforced that she has stage IV  disease which is incurable but we can attempt palliation with systemic chemotherapy; response can not be guaranteed and long-term prognosis is guarded.    She understands and agrees with this plan.    Follow-up:  No follow-ups on file.

## 2023-05-17 NOTE — Clinical Note
Orders for today:   Potassium 3.4, low.   Check magnesium level  Start potassium chloride 20 mEq p.o. q.day x2 weeks; no refills  In 2 weeks, recheck CMP and magnesium level  Follow-up with radiation oncology for brain metastases Continue eribulin with Neulasta support Check CBC and CMP prior to each dose of eribulin Check tumor markers including CEA, CA 15-3, and CA 27.29 level every month to assess response; next, 06/01/2023  Re-stage with contrast enhanced CT scans of C/A/P in August   Continue narcotics for pain in right breast and in the back  Continue Megace  Follow-up with NP in 2 weeks.

## 2023-05-25 ENCOUNTER — INFUSION (OUTPATIENT)
Dept: INFUSION THERAPY | Facility: HOSPITAL | Age: 68
End: 2023-05-25
Attending: INTERNAL MEDICINE
Payer: MEDICAID

## 2023-05-25 ENCOUNTER — CLINICAL SUPPORT (OUTPATIENT)
Dept: HEMATOLOGY/ONCOLOGY | Facility: CLINIC | Age: 68
End: 2023-05-25
Payer: MEDICAID

## 2023-05-25 DIAGNOSIS — C50.011 MALIGNANT NEOPLASM OF NIPPLE OF RIGHT BREAST IN FEMALE, UNSPECIFIED ESTROGEN RECEPTOR STATUS: ICD-10-CM

## 2023-05-25 DIAGNOSIS — C78.7 MALIGNANT NEOPLASM METASTATIC TO LIVER: Primary | ICD-10-CM

## 2023-05-25 DIAGNOSIS — R59.0 AXILLARY LYMPHADENOPATHY: ICD-10-CM

## 2023-05-25 DIAGNOSIS — C50.911 MALIGNANT NEOPLASM OF RIGHT FEMALE BREAST, UNSPECIFIED ESTROGEN RECEPTOR STATUS, UNSPECIFIED SITE OF BREAST: ICD-10-CM

## 2023-05-25 LAB
ALBUMIN SERPL-MCNC: 3 G/DL (ref 3.4–4.8)
ALBUMIN/GLOB SERPL: 1 RATIO (ref 1.1–2)
ALP SERPL-CCNC: 231 UNIT/L (ref 40–150)
ALT SERPL-CCNC: 12 UNIT/L (ref 0–55)
AST SERPL-CCNC: 67 UNIT/L (ref 5–34)
BASOPHILS # BLD AUTO: 0.05 X10(3)/MCL
BASOPHILS NFR BLD AUTO: 0.4 %
BILIRUBIN DIRECT+TOT PNL SERPL-MCNC: 1.4 MG/DL
BUN SERPL-MCNC: 11.9 MG/DL (ref 9.8–20.1)
CALCIUM SERPL-MCNC: 9.3 MG/DL (ref 8.4–10.2)
CEA SERPL-MCNC: 10.51 NG/ML (ref 0–3)
CHLORIDE SERPL-SCNC: 110 MMOL/L (ref 98–107)
CO2 SERPL-SCNC: 24 MMOL/L (ref 23–31)
CREAT SERPL-MCNC: 1.01 MG/DL (ref 0.55–1.02)
EOSINOPHIL # BLD AUTO: 0 X10(3)/MCL (ref 0–0.9)
EOSINOPHIL NFR BLD AUTO: 0 %
ERYTHROCYTE [DISTWIDTH] IN BLOOD BY AUTOMATED COUNT: 17.4 % (ref 11.5–17)
GFR SERPLBLD CREATININE-BSD FMLA CKD-EPI: >60 MLS/MIN/1.73/M2
GLOBULIN SER-MCNC: 3 GM/DL (ref 2.4–3.5)
GLUCOSE SERPL-MCNC: 120 MG/DL (ref 82–115)
HCT VFR BLD AUTO: 35.8 % (ref 37–47)
HGB BLD-MCNC: 11.7 G/DL (ref 12–16)
IMM GRANULOCYTES # BLD AUTO: 0.09 X10(3)/MCL (ref 0–0.04)
IMM GRANULOCYTES NFR BLD AUTO: 0.7 %
LYMPHOCYTES # BLD AUTO: 1.01 X10(3)/MCL (ref 0.6–4.6)
LYMPHOCYTES NFR BLD AUTO: 7.8 %
MAGNESIUM SERPL-MCNC: 2 MG/DL (ref 1.6–2.6)
MCH RBC QN AUTO: 35.3 PG (ref 27–31)
MCHC RBC AUTO-ENTMCNC: 32.7 G/DL (ref 33–36)
MCV RBC AUTO: 108.2 FL (ref 80–94)
MONOCYTES # BLD AUTO: 0.63 X10(3)/MCL (ref 0.1–1.3)
MONOCYTES NFR BLD AUTO: 4.9 %
NEUTROPHILS # BLD AUTO: 11.12 X10(3)/MCL (ref 2.1–9.2)
NEUTROPHILS NFR BLD AUTO: 86.2 %
NRBC BLD AUTO-RTO: 0 %
PLATELET # BLD AUTO: 124 X10(3)/MCL (ref 130–400)
PLATELETS.RETICULATED NFR BLD AUTO: 6.4 % (ref 0.9–11.2)
PMV BLD AUTO: 11.7 FL (ref 7.4–10.4)
POTASSIUM SERPL-SCNC: 3.3 MMOL/L (ref 3.5–5.1)
PROT SERPL-MCNC: 6 GM/DL (ref 5.8–7.6)
RBC # BLD AUTO: 3.31 X10(6)/MCL (ref 4.2–5.4)
SODIUM SERPL-SCNC: 144 MMOL/L (ref 136–145)
WBC # SPEC AUTO: 12.9 X10(3)/MCL (ref 4.5–11.5)

## 2023-05-25 PROCEDURE — 63600175 PHARM REV CODE 636 W HCPCS: Performed by: INTERNAL MEDICINE

## 2023-05-25 PROCEDURE — 96409 CHEMO IV PUSH SNGL DRUG: CPT

## 2023-05-25 PROCEDURE — 96375 TX/PRO/DX INJ NEW DRUG ADDON: CPT

## 2023-05-25 PROCEDURE — 85025 COMPLETE CBC W/AUTO DIFF WBC: CPT

## 2023-05-25 PROCEDURE — 80053 COMPREHEN METABOLIC PANEL: CPT

## 2023-05-25 PROCEDURE — 82378 CARCINOEMBRYONIC ANTIGEN: CPT

## 2023-05-25 PROCEDURE — 25000003 PHARM REV CODE 250: Performed by: INTERNAL MEDICINE

## 2023-05-25 PROCEDURE — A4216 STERILE WATER/SALINE, 10 ML: HCPCS | Performed by: INTERNAL MEDICINE

## 2023-05-25 PROCEDURE — 36415 COLL VENOUS BLD VENIPUNCTURE: CPT

## 2023-05-25 PROCEDURE — 86300 IMMUNOASSAY TUMOR CA 15-3: CPT | Mod: 90

## 2023-05-25 PROCEDURE — 83735 ASSAY OF MAGNESIUM: CPT

## 2023-05-25 PROCEDURE — 86300 IMMUNOASSAY TUMOR CA 15-3: CPT

## 2023-05-25 RX ORDER — ONDANSETRON 2 MG/ML
8 INJECTION INTRAMUSCULAR; INTRAVENOUS
Status: COMPLETED | OUTPATIENT
Start: 2023-05-25 | End: 2023-05-25

## 2023-05-25 RX ORDER — HEPARIN 100 UNIT/ML
500 SYRINGE INTRAVENOUS
Status: DISCONTINUED | OUTPATIENT
Start: 2023-05-25 | End: 2023-05-25 | Stop reason: HOSPADM

## 2023-05-25 RX ORDER — SODIUM CHLORIDE 0.9 % (FLUSH) 0.9 %
10 SYRINGE (ML) INJECTION
Status: DISCONTINUED | OUTPATIENT
Start: 2023-05-25 | End: 2023-05-25 | Stop reason: HOSPADM

## 2023-05-25 RX ADMIN — ERIBULIN MESYLATE 1.6 MG: 0.5 INJECTION INTRAVENOUS at 11:05

## 2023-05-25 RX ADMIN — SODIUM CHLORIDE: 9 INJECTION, SOLUTION INTRAVENOUS at 10:05

## 2023-05-25 RX ADMIN — Medication 500 UNITS: at 12:05

## 2023-05-25 RX ADMIN — ONDANSETRON 8 MG: 2 INJECTION INTRAMUSCULAR; INTRAVENOUS at 11:05

## 2023-05-25 RX ADMIN — Medication 10 ML: at 12:05

## 2023-05-25 NOTE — PROGRESS NOTES
Potassium 3.3, low.      Check magnesium level.    Start potassium chloride 40 mEq p.o. q.day x2 weeks; no refills  In 2 weeks, recheck CMP and magnesium level.

## 2023-05-25 NOTE — NURSING
1040  Pt did labs, saw provider, and is here for C 4 D 1 halaven.  Denies any pain or complaint.  Pt amb using rolling walker.  1114  Dr. Hatfield messaged about pt's Potassium level 3.3 and cr cl 42.5 which is below treatment parameters.  He replied to decrease chemo to 1.1 mg/m squared.  Pharmacy notified of change and he stated that pt had been on that dose reduction since 3/9/23.  1111  Dr. Hatfield made aware that the dose had been reduced since March.  He stated to proceed at that dose reduction again today.

## 2023-05-26 LAB — CANCER AG15-3 SERPL IA-ACNC: 46 U/ML

## 2023-05-31 LAB — CANCER AG27-29 SERPL-ACNC: 78.5 U/ML

## 2023-06-01 ENCOUNTER — OFFICE VISIT (OUTPATIENT)
Dept: HEMATOLOGY/ONCOLOGY | Facility: CLINIC | Age: 68
End: 2023-06-01
Payer: MEDICAID

## 2023-06-01 ENCOUNTER — INFUSION (OUTPATIENT)
Dept: INFUSION THERAPY | Facility: HOSPITAL | Age: 68
End: 2023-06-01
Attending: INTERNAL MEDICINE

## 2023-06-01 ENCOUNTER — TELEPHONE (OUTPATIENT)
Dept: HEMATOLOGY/ONCOLOGY | Facility: CLINIC | Age: 68
End: 2023-06-01
Payer: MEDICAID

## 2023-06-01 VITALS
RESPIRATION RATE: 20 BRPM | WEIGHT: 106.63 LBS | OXYGEN SATURATION: 100 % | BODY MASS INDEX: 19.62 KG/M2 | SYSTOLIC BLOOD PRESSURE: 106 MMHG | TEMPERATURE: 96 F | HEART RATE: 91 BPM | HEIGHT: 62 IN | DIASTOLIC BLOOD PRESSURE: 64 MMHG

## 2023-06-01 DIAGNOSIS — G62.0 CHEMOTHERAPY-INDUCED NEUROPATHY: ICD-10-CM

## 2023-06-01 DIAGNOSIS — C50.911 MALIGNANT NEOPLASM OF RIGHT FEMALE BREAST, UNSPECIFIED ESTROGEN RECEPTOR STATUS, UNSPECIFIED SITE OF BREAST: Primary | ICD-10-CM

## 2023-06-01 DIAGNOSIS — D70.1 CHEMOTHERAPY INDUCED NEUTROPENIA: Primary | ICD-10-CM

## 2023-06-01 DIAGNOSIS — T45.1X5A CHEMOTHERAPY-INDUCED NEUROPATHY: ICD-10-CM

## 2023-06-01 DIAGNOSIS — K59.09 OTHER CONSTIPATION: ICD-10-CM

## 2023-06-01 DIAGNOSIS — T45.1X5A CHEMOTHERAPY INDUCED NEUTROPENIA: Primary | ICD-10-CM

## 2023-06-01 DIAGNOSIS — T45.1X5A CHEMOTHERAPY-INDUCED NEUTROPENIA: ICD-10-CM

## 2023-06-01 DIAGNOSIS — D70.1 CHEMOTHERAPY-INDUCED NEUTROPENIA: ICD-10-CM

## 2023-06-01 PROCEDURE — 1126F AMNT PAIN NOTED NONE PRSNT: CPT | Mod: CPTII,,, | Performed by: NURSE PRACTITIONER

## 2023-06-01 PROCEDURE — 3074F PR MOST RECENT SYSTOLIC BLOOD PRESSURE < 130 MM HG: ICD-10-PCS | Mod: CPTII,,, | Performed by: NURSE PRACTITIONER

## 2023-06-01 PROCEDURE — 99213 OFFICE O/P EST LOW 20 MIN: CPT | Mod: PBBFAC | Performed by: NURSE PRACTITIONER

## 2023-06-01 PROCEDURE — 99214 PR OFFICE/OUTPT VISIT, EST, LEVL IV, 30-39 MIN: ICD-10-PCS | Mod: S$PBB,,, | Performed by: NURSE PRACTITIONER

## 2023-06-01 PROCEDURE — 1101F PT FALLS ASSESS-DOCD LE1/YR: CPT | Mod: CPTII,,, | Performed by: NURSE PRACTITIONER

## 2023-06-01 PROCEDURE — 3078F PR MOST RECENT DIASTOLIC BLOOD PRESSURE < 80 MM HG: ICD-10-PCS | Mod: CPTII,,, | Performed by: NURSE PRACTITIONER

## 2023-06-01 PROCEDURE — 3008F BODY MASS INDEX DOCD: CPT | Mod: CPTII,,, | Performed by: NURSE PRACTITIONER

## 2023-06-01 PROCEDURE — 1160F RVW MEDS BY RX/DR IN RCRD: CPT | Mod: CPTII,,, | Performed by: NURSE PRACTITIONER

## 2023-06-01 PROCEDURE — 3288F PR FALLS RISK ASSESSMENT DOCUMENTED: ICD-10-PCS | Mod: CPTII,,, | Performed by: NURSE PRACTITIONER

## 2023-06-01 PROCEDURE — 1160F PR REVIEW ALL MEDS BY PRESCRIBER/CLIN PHARMACIST DOCUMENTED: ICD-10-PCS | Mod: CPTII,,, | Performed by: NURSE PRACTITIONER

## 2023-06-01 PROCEDURE — 1159F PR MEDICATION LIST DOCUMENTED IN MEDICAL RECORD: ICD-10-PCS | Mod: CPTII,,, | Performed by: NURSE PRACTITIONER

## 2023-06-01 PROCEDURE — 99214 OFFICE O/P EST MOD 30 MIN: CPT | Mod: S$PBB,,, | Performed by: NURSE PRACTITIONER

## 2023-06-01 PROCEDURE — 1126F PR PAIN SEVERITY QUANTIFIED, NO PAIN PRESENT: ICD-10-PCS | Mod: CPTII,,, | Performed by: NURSE PRACTITIONER

## 2023-06-01 PROCEDURE — 3078F DIAST BP <80 MM HG: CPT | Mod: CPTII,,, | Performed by: NURSE PRACTITIONER

## 2023-06-01 PROCEDURE — 3008F PR BODY MASS INDEX (BMI) DOCUMENTED: ICD-10-PCS | Mod: CPTII,,, | Performed by: NURSE PRACTITIONER

## 2023-06-01 PROCEDURE — 96372 THER/PROPH/DIAG INJ SC/IM: CPT

## 2023-06-01 PROCEDURE — 1101F PR PT FALLS ASSESS DOC 0-1 FALLS W/OUT INJ PAST YR: ICD-10-PCS | Mod: CPTII,,, | Performed by: NURSE PRACTITIONER

## 2023-06-01 PROCEDURE — 63600175 PHARM REV CODE 636 W HCPCS: Performed by: NURSE PRACTITIONER

## 2023-06-01 PROCEDURE — 3288F FALL RISK ASSESSMENT DOCD: CPT | Mod: CPTII,,, | Performed by: NURSE PRACTITIONER

## 2023-06-01 PROCEDURE — 1159F MED LIST DOCD IN RCRD: CPT | Mod: CPTII,,, | Performed by: NURSE PRACTITIONER

## 2023-06-01 PROCEDURE — 3074F SYST BP LT 130 MM HG: CPT | Mod: CPTII,,, | Performed by: NURSE PRACTITIONER

## 2023-06-01 RX ORDER — LACTULOSE 10 G/15ML
20 SOLUTION ORAL DAILY
Qty: 300 ML | Refills: 0 | Status: SHIPPED | OUTPATIENT
Start: 2023-06-01 | End: 2023-06-11

## 2023-06-01 RX ORDER — CIPROFLOXACIN 500 MG/1
500 TABLET ORAL 2 TIMES DAILY
Qty: 14 TABLET | Refills: 0 | Status: SHIPPED | OUTPATIENT
Start: 2023-06-01 | End: 2023-06-08

## 2023-06-01 RX ORDER — MEGESTROL ACETATE 40 MG/ML
SUSPENSION ORAL
COMMUNITY
Start: 2023-03-07

## 2023-06-01 RX ADMIN — FILGRASTIM-SNDZ 300 MCG: 300 INJECTION, SOLUTION INTRAVENOUS; SUBCUTANEOUS at 10:06

## 2023-06-01 NOTE — Clinical Note
Fu w/NP in 3 weeks for cycle 5 day 1 Halaven 6/19 pending patient receives Halaven cycle 4 day 8 on 6/5/23
Infusion today for neupogen injection hold day 8 cycle 4 Halaven today Return to infusion tomorrow 6/2 for neupogen  Return on 6/5 lab work first if improved ANC hold #3 Neupogen and give Neupogen injection
40.4

## 2023-06-01 NOTE — PROGRESS NOTES
Reason for Follow-up:  -IDC right breast, axillary lymph node biopsy +, presumed triple negative, AJCC stage IIB, clinical prognostic stage IIIB  -liver metastases; adrenal lesions  -brain metastasis  -History of right lower extremity DVT    -right thyroid lesion     Current Treatment:  -eribulin   started 02/23/2023 (dose reduced to 1.1 mg per m2 due to reduced creatinine clearance)    Treatment History:  -03/02/2022: Mediport placed  -neoadjuvant DD AC (4/13/2022-6/2/2022)  -S/P neoadjuvant dose dense Taxol x4 (08/31/2022-10/12/2022)  -04/12/2023:  Received SRS to brain metastasis (2100 cGy; 1/1 fraction)    Past medical history: Obesity.  Tobacco abuse.   -02/19/2020: Acute nonocclusive DVT in the right distal femoral vein; acute occlusive DVTs right popliteal and right peroneal veins ((she never followed up on that)  Procedure/surgical history: Cholecystectomy (2001).  Bilateral tubal ligation (according to her).  Social history: .  Lives in [place], [place].  Has 2 children.  Children.  Does not work.  Smoked half a pack of cigarettes daily for 40 years; quit 2 weeks ago.  No alcohol or illicit drugs.  Family history: No family history of cancer.  Health maintenance: According to her, screening colonoscopy 5 years ago at [facility], probably revealing a benign polyp.  Menstrual and OB/GYN history: Menarche, age 13.  Menopause, age 59.  G2, P2.  No abortions or miscarriages.  Age at delivery of first child, 22.  Did not breast-feed her children.  Used birth control pills for 1 year, subsequently, bilateral tubal ligation.  No history of hormone replacement therapy.    History of Present Illness:    66-year-old lady referred from surgery, with breast cancer.     She initially presented to emergency department 11/15/2021 for evaluation of breast pain and a palpable lump in the right breast.  Subsequent imaging studies and biopsy established diagnosis of invasive ductal carcinoma of right breast, axillary  lymph node positive, ER low positive, AR negative, and HER-2 negative.    Oncologic history:   #IDC right breast, presumed triple negative:   -History of right lower extremity DVT and right breast mass 02/2020; she did not follow-up   -Presentation: 11/2021: Palpable mass   -01/07/2022: Bilateral diagnostic mammogram with contrast and limited ultrasound right breast   -01/07/2022: Biopsy   -3.1 cm mass on mammogram, overall grade 3, right axillary lymph node biopsy positive   -ER low positive (1%).  AR negative (0%).  HER2 equivocal (Score 2+).  HER-2 negative by FISH.  Ki-67 high proliferation (50%)   >>>   For the purpose of neoadjuvant chemotherapy, since ER is low positive, we will treat the patient as if triple negative   >>>  -cT2 cN1 MX, grade 3, presumed triple negative, AJCC anatomic stage IIB, clinical prognostic stage IIIB   -02/22/2022: DEXA scan: Normal BMD   -02/22/2022: CT C/A/P with contrast for staging: Right breast mass with right axillary lymphadenopathy; bilateral adrenal nodules, statistically representing adenomas   -02/22/2022: Whole-body nuclear medicine bone scan: No bone metastasis   -03/02/2022: Mediport placed   -03/11/2022: TTE: LVEF 55-60%  -neoadjuvant DD AC started 04/13/2022; severe neutropenia, ANC 0.03, on 04/26/2022; therefore, cycle 2 held; treated with growth factor and prophylactic ciprofloxacin  -cycle 2 on 05/05/2022; cycle 3 on 05/19/2022; cycle 4 on 06/02/2022   -some progression on restaging mammogram and ultrasound 06/17/2022  -06/21/2022:  She was supposed to start neoadjuvant dose dense Taxol but was sent to emergency department for evaluation because she was feeling extremely weak, sick, unable to walk for 2-3 weeks  -06/21/2022:  WBC, differential unremarkable.  Hemoglobin 9.2, stable.  Platelets 210 K. ANC 5.6.  CMP stable and within acceptable limits.  Magnesium normal.  BNP normal.  TSH 2.2180, normal.    -06/21/2022:  V/Q scan:  Normal/very low probability of  pulmonary embolism  -07/15/2022:  CT C/A/P with contrast (comparison:  02/22/2022):   Mass in the right breast with associated skin thickening in the right breast overall slightly less prominent than the prior examination.  The right axillary lymphadenopathy is also less prominent than the prior examination   Interval development of multiple punctate hypoattenuating areas within the liver concerning for possible developing metastatic foci.  Short-term follow-up is recommended.  These nodules are too small to characterize and are all subcentimeter in size.   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  CA 27.29 level normal.  CA 15-3 level normal.  -neoadjuvant dose dense Taxol started 08/31/2022  -neoadjuvant dose dense Taxol: Cycle 1 (08/31/2022); cycle 2 (09/14/2022)  -09/09/2022: Whole-body nuclear medicine bone scan (comparison:  Bone scan 02/22/2022; CT C/A/P 0 07/15/2022): No bone metastasis  -09/19/2022: PET-CT to rule out metastatic disease (comparison:  09/09/2022 bone scan; 07/15/2022 CT C/A/P):  1. Slight interval enlargement of hypermetabolic right breast mass and associated right axillary node.  2. Metastatic right hepatic lesion (central aspect of right liver, 1.9 x 1.2 cm), with no other definite FDG-avid or aggressive appearing process through the neck, chest,  abdomen, or pelvis.  3. Subcentimeter [place] foci are less conspicuous and again of limited characterization due to size and non-contrast protocol, but statistically favored to represent benign cysts.  Close attention on surveillance imaging is needed in order to ensure ongoing stability.  4. Incidental right adrenal adenoma (1.7 x 4.3 cm)  5. Diffuse osseous uptake is favored to reflect sequela of systemic therapeutic agent.  6. Hypermetabolic low-density nodule posterior right hemithyroid, 1.6 x 1.4 cm, maximum SUV 4.6, every -neoadjuvant dose dense Taxol:  Cycle 3 (09/28/2022); cycle 4 (10/12/2022)  -10/17/2022:  Thyroid ultrasound:  1. Multinodular thyroid.  2. The dominant right upper thyroid pole nodule previously demonstrated hypermetabolic activity, warranting biopsy.  3. Dominant central left thyroid nodule meets biopsy criteria.  4. Additional nodules do not meet biopsy criteria secondary to insufficient size/sonographic characteristics; recommendations for ultrasound surveillance provided above.  -10/21/2022:  MRI abdomen with and without contrast (comparison:  09/19/2022):  1. There are 2 small right hepatic lobe lesions suspicious for metastases.  One of these was hypermetabolic on recent PET-CT.  Right lobe lesion 9 mm, corresponding to hypermetabolic lesion on PET-CT.  More inferiorly in right hepatic lobe 6 mm, this was not evident on PET  2. Bilateral adrenal adenomas show no significant change going back to February 2022.  -11/04/2022: IR:  Liver lesion too small for biopsy  -11/04/2022:  Restaging bilateral diagnostic mammogram with contrast and limited ultrasound right breast (comparison:  06/17/2022):  Right breast mass 3.8 x 2.8 x 3.4 cm, previously 3.2 x 3.1 x 3.6 cm (06/17/2022) and 3 x 3.1 x 2.1 cm) 12/21/2021); right axillary lymph node 2.6 x 1 x 1.4 cm, previously 3.6 x 1.3 x 1.3 cm (06/17/2022) and 3.5 x 0.9 x 1.4 cm (12/21/2021) (right breast malignancy with nipple and skin involvement;  compared to 06/17/2022, there has been interval development of at least 2 adjacent satellite lesions upper outer right breast and interval progression in the associated calcifications; total satellite lesions measure 3.6 x 5.0 x 5.3 cm, previously 3.8 x 3.5 x 3 cm on 06/17/2022 and 3.5 by 2.6 x 2.8 cm on 01/07/2022)  -12/08/2022:  Ultrasound-guided biopsy, bilateral (FNA): Pathology:  1. Fine needle aspiration of left thyroid nodule: Unsatisfactory. Insufficient cells for diagnostic evaluation.   2. Fine needle aspiration of right thyroid nodule: Cytomorphology  of a benign thyroid nodule.  -01/09/2023: Restaging CTs C/A/P with contrast (comparison:  PET-CT 09/19/2022, MRI abdomen 09/21/2022):   1. Interval progression of disease  2. Slight interval increase in size of right breast mass (4.6 x 3 cm, previously 4 x 3 cm)  3. Interval increase in size of right axillary lymph node (1.9 cm, previously 1.1 cm)  4. Interval increase in size and number of hepatic metastases (largest mass with satellite nodularity right lobe 7.3 x 7.1 cm, previously 2 cm)  -01/30/2023:  Ultrasound soft tissues of the neck pelvic comparison:  10/17/2022):   The hypermetabolic right thyroid lobe upper pole dominant 1.8 cm diameter isoechoic nodule appear slightly enlarged from prior 1.5 cm.   Grossly stable 1.6 cm diameter cystic and solid left thyroid lobe nodule with partially obscured borders, defer to prior FNA results.  -01/31/2023: Restaging whole-body nuclear medicine bone scan pelvic comparison: CT C/A/P 0 01/09/2023):  No bone metastasis  -02/02/2023:  CT-guided right liver biopsy: Pathology:  Metastatic carcinoma, consistent with a breast primary  -01/23/2023:  CEA 5.10, elevated; CA 27.29 level 49.1, elevated; CA 15-3 level 22, normal     Interval History 6/1/2023: Patient presents today alone  for scheduled follow up for metastatic breast cancer. She is due to receive cycle 4 day 8 of Halaven today. She reports upset stomach and  difficulty using the bathroom. Patient says its has been a quite a few days. Patient says she has been taking a stool softener but has not been effective. Discussed interventions to help with constipation. She deneis any fever, signs of infection, confusion, unusual headaches, seizure activity, vision changes, neurological changes, new or worsening lumps/masses, chest pain, blood in urine/stool. Lab work reviewed with patient, . Low H&H and Platelet count but stable. Discussed with patient plan of care and follow up appointments.     Review of Systems:   All systems reviewed and found to be negative except for the symptoms detailed above    Lab Results   Component Value Date    WBC 1.37 (LL) 06/01/2023    RBC 2.93 (L) 06/01/2023    HGB 10.1 (L) 06/01/2023    HCT 30.7 (L) 06/01/2023    .8 (H) 06/01/2023    MCH 34.5 (H) 06/01/2023    MCHC 32.9 (L) 06/01/2023    RDW 16.8 06/01/2023     (L) 06/01/2023    MPV 11.8 (H) 06/01/2023     CMP  Sodium Level   Date Value Ref Range Status   06/01/2023 141 136 - 145 mmol/L Final     Potassium Level   Date Value Ref Range Status   06/01/2023 3.7 3.5 - 5.1 mmol/L Final     Carbon Dioxide   Date Value Ref Range Status   06/01/2023 23 23 - 31 mmol/L Final     Blood Urea Nitrogen   Date Value Ref Range Status   06/01/2023 12.8 9.8 - 20.1 mg/dL Final     Creatinine   Date Value Ref Range Status   06/01/2023 0.96 0.55 - 1.02 mg/dL Final     Calcium Level Total   Date Value Ref Range Status   06/01/2023 9.0 8.4 - 10.2 mg/dL Final     Albumin Level   Date Value Ref Range Status   06/01/2023 2.8 (L) 3.4 - 4.8 g/dL Final     Bilirubin Total   Date Value Ref Range Status   06/01/2023 1.9 (H) <=1.5 mg/dL Final     Alkaline Phosphatase   Date Value Ref Range Status   06/01/2023 250 (H) 40 - 150 unit/L Final     Aspartate Aminotransferase   Date Value Ref Range Status   06/01/2023 73 (H) 5 - 34 unit/L Final     Alanine Aminotransferase   Date Value Ref Range Status    06/01/2023 14 0 - 55 unit/L Final     eGFR   Date Value Ref Range Status   06/01/2023 >60 mls/min/1.73/m2 Final       Physical Examination:   VITAL SIGNS:   Vitals:    06/01/23 0927   BP: 106/64   Pulse: 91   Resp: 20   Temp: 96.2 °F (35.7 °C)     General: Alert and oriented. NAD, in wheelchair   Eye: Pupils are equal, round and reactive to light, Extraocular movements are intact. Normal conjunctiva  HENT: Normocephalic. Oropharynx exam deferred;   Neck: Supple, Non-tender  Respiratory: Respirations are non-labored, Symmetrical chest wall expansion. Breath sounds CTA bilaterally  Cardiovascular: Regular rate, rhythm, Normal peripheral perfusion, No bilateral lower extremity edema  Gastrointestinal: Non-distended, Present bowel sounds   Genitourinary: Exam deferred  Lymphatics: No lymphadenopathy appreciated  Musculoskeletal: Moves all extremities  Integumentary: Intact. Warm, dry. No rashes, or lesions to visible skin  Neurologic: No focal deficits  Psychiatric: Cooperative. Appropriate mood and affect   ECOG Performance Scale: 1 - Capable of all self-care able to carry out light work activities.      Assessment:  IDC right breast, ER low pos (1%), ID negative (0%), HER2 negative (practically triple negative):  -IDC right breast, high-grade, practically triple negative (ER 1%, low +; ID negative; HER2 2+, equivocal; HER2 negative by FISH)  -right breast mass initially noted 02/2020; she did not follow-up;   -biopsy after 2 years (01/07/2022)  -ER low + (1%).  ID negative (0%).  HER2 negative.  Ki-67 high proliferation (50%).  -3.1 cm mass on mammogram.  Grade 3. Right axillary lymph node biopsy +  -cT2 cN1 MX, grade 3, practically triple negative, AJCC anatomic stage IIB, clinical prognostic stage IIIB  -S/P neoadjuvant ddAC x4 (04/13/2022-06/02/2022), with some progression on restaging mammogram and ultrasound 06/17/2022  -S/P neoadjuvant dose dense Taxol x4 (08/31/2022-10/12/2022)  >> progression of right breast  lesion but right axillary lymph node smaller on restaging bilateral diagnostic mammogram with contrast and limited ultrasound right breast 11/04/2022  >>>  Radiologic progression post neoadjuvant chemotherapy, with worsening liver metastases (triple negative):  -suspicion of small liver metastases on PET-CT 09/19/2022 and MRI abdomen 10/21/2022 (1.9 x 1.2 cm on PET-CT but 9 mm on MRI); per IR, too small for biopsy  -01/04/2023: Says that the lump in right breast is back  -disease progression on restaging CTs C/A/P 01/09/2023 (enlargement of right breast mass and right axillary lymph node; increase in size and number of liver metastasis, largest mass 7.3 x 7.1 cm, previously 2 cm)  -01/23/2023: No significant symptoms; ECOG 1  -01/23/2023:  CEA 5.10, elevated; CA 27.29 level 49.1, elevated; CA 15-3 level 22, normal  -no bone metastases on bone scan 01/31/2023  -CT-guided liver biopsy 02/02/2023: Metastatic carcinoma, consistent with breast primary; ER negative (0%); CO negative (0%); HER2 negative (0%) Ki-67 borderline proliferation (18%)  -liver biopsy 02/02/2023:  No PD-L1 expression for Keytruda ; NGS study nondiagnostic due to inability to Arthur 5 DNA/RNA   -progression of metastasis on CTs C/A/P 0 02/17/2023, performed before starting eribulin  -eribulin started 02/23/2023 (dose reduced to 1.1 mg per m2 due to reduced creatinine clearance)  -brain metastases noted on brain MRI 03/03/2023; no vasogenic edema  -01/30/2023:  BRCA1/2 analysis with [facility]:  Carrier: 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)  -eribulin cycle 1 day 8 (03/09/2023)  -cycle 2 is overdue  -03/09/2023: CEA level 17.78, declining; CA 27.29 level 134, declining; CA 15-3 level 65, declining  -03/02/2023: Liquid biopsy: PIK3CA mutation pos (not relevant in this patient because liver biopsy showed triple negative breast cancer metastasis); TP53 pos; microsatellite stable BRAF mutation negative; BRCA2  1/2-; HER2 negative; NTRK1/2/3 negative; RET negative  -eribulin:  Cycle 2 started 03/30/2023; cycle 3 started 04/27/2023; day 8 cycle 3 on 05/11/2023  -04/12/2023:  Received SRS to brain metastasis (2100 cGy; 1/1 fraction)  -pos response post eribulin x2 cycles, on restaging CTs 04/24/2023  -tumor markers continue to drop as of 05/11/2023      Sites of disease:  Enlarging right breast mass post neoadjuvant ddAC and Taxol  Right axillary lymphadenopathy  Liver metastases, largest 7.3 x 7.1 cm on CT  -brain metastases noted on brain MRI 03/03/2023; no vasogenic edema        Molecular markers:  # breast biopsy (01/07/2022): ER low + (1%).  AR negative (0%).  HER2 equivocal (2+).  HER2 negative. 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)  (Genetic testing essentially negative)  -liver biopsy (02/02/2023) study:  No PD-L1 expression for Keytruda based on both CPS (< 1%) and TPS (<1); otherwise, nondiagnostic study (unable to amplified DNA/RNA)  -03/02/2023: Liquid biopsy: PIK3CA mutation pos (not relevant in this patient because liver biopsy showed triple negative breast cancer metastasis); TP53 pos; microsatellite stable BRAF mutation negative; BRCA2 1/2 negative; HER2 negative; NTRK1/2/3 negative; RET negative       Right hemithyroid lesion:  -PET-CT 09/19/2022: 1.6 x 1.4 cm hypermetabolic low-density nodule posterior right hemithyroid, maximum SUV 4.6, average attenuation 39 HU  -10/17/2022:  Thyroid ultrasound:  1. Multinodular thyroid.  2. The dominant right upper thyroid pole nodule previously demonstrated hypermetabolic activity, warranting biopsy.  3. Dominant central left thyroid  nodule meets biopsy criteria.  4. Additional nodules do not meet biopsy criteria secondary to insufficient size/sonographic characteristics; recommendations for ultrasound surveillance provided above.  -12/08/2022:  Ultrasound-guided biopsy, bilateral (FNA): Pathology:  1. Fine needle aspiration of left thyroid nodule: Unsatisfactory. Insufficient cells for diagnostic evaluation.   2. Fine needle aspiration of right thyroid nodule: Cytomorphology  of a benign thyroid nodule.        History of right lower extremity DVT 02/19/2020:  -02/19/2020: (Right leg swelling for 3 days) acute nonocclusive DVT in the right distal femoral vein; acute occlusive DVTs right popliteal and right peroneal veins  -Apparently, at the same time, was also noted to have a breast lump  Which could only be biopsied on 01/07/2022, 2 years later, because of for noncompliance with follow-up (right breast, 3:00, a small lump, nontender, slightly irregular on palpation, not hard or fluctuant, no erythema) (apparently, [facility] ambulatory clinics tried to contact her but without any response )    Plan:   Metastatic carcinoma, consistent with breast primary; ER negative (0%); OK negative (0%); HER2 negative (0%) Ki-67 borderline proliferation (18%)  Metastatic disease, developing after neoadjuvant ddAC followed by neoadjuvant Taxol (completed 10/12/2022)   In this situation, no indication of surgical resection of breast mass  Sites of disease:  Enlarging right breast mass; right axillary lymphadenopathy; increasing # and size of liver metastases, largest 7.3 x 7.1 cm  -liver biopsy 02/02/2023:  No PD-L1 expression for Keytruda ; NGS study nondiagnostic due to inability to amplify DNA/RNA   -eribulin started 02/23/2023 (dose reduced to 1.1 mg per m2 due to reduced creatinine clearance)  >>>  Liver metastases are triple negative (ER negative, OK negative, HER2 negative) (HER2 0%)  Liver biopsy (02/02/2023) study:  No PD-L1 expression for Keytruda based on  both CPS (< 1%) and TPS (<1)  Therefore, not a candidate for treatment with pembrolizumab/chemotherapy combination  Since PD-L1 CPS <10 (< 1%), and since germline BRCA1/2 mutation negative, therefore, recommendation is to treat with chemotherapy  In second-line, sacituzumab govitecan (category 1, Preferred), or systemic chemotherapy can be given  HER2 negative (0%), therefore, not a candidate for Fam-trastuzumab deruxtecan  Triple negative status of liver metastases became known only after we started the patient on eribulin  Will avoid Adriamycin, Cytoxan, and Taxol because patient progressed on neoadjuvant ddAC and neoadjuvant Taxol  pos response post eribulin x2 cycles  Continue eribulin       Hold Eribulin cycle 4 day 8 today due to Neutropenia   Give Neupogen 300mcg (6/1/23, 6/2/23)  Neutropenic Precautions  Start Cipro 500mg bid x7 days-rx sent to pharmacy   Start Lactulose 30ml po daily prn for constipation-rx sent to pharmacy  Return on 6/5/23 with lab work (cbc,cmp) if ANC improved proceed with cycle 4 day 8 of [place], if ANC remains below 1000 give #3 of Neupogen injection   Follow up with NP in 3 weeks with lab work (cbc,cmp,mg) prior to cyce 5 day 1 Erbulin  From cycle 2 of eribulin, we have added Neulasta support (because, developed severe neutropenia 7 days after day 1 cycle 1 of eribulin  Check CBC and CMP prior to each dose of eribulin   Check tumor markers including CEA, CA 15-3, and CA 27.29 level every month to assess response (next, 06/01/2023)  Re-stage with contrast enhanced CT scans of C/A/P in 3 months (August)-Scheduled for 8/3/2023    2. pain in right breast and in the back  Continue Norco      3.  Anorexia;   03/06/2023: start Megace 800 mg p.o. q.day  Continue Megace    Eribulin:  Schedule:  Days 1 and 8: Eribulin 1.4 mg/m² IV push  Repeat cycle every 3 weeks  IV infusion over 2-5 minutes     Warnings/precautions with eribulin:  1.  Bone marrow suppression.  Severe neutropenia and  neutropenic fever.  2.  Peripheral neuropathy  3.  QT prolongation     Adverse reactions with eribulin:  >10%  Peripheral edema, fatigue, peripheral neuropathy, headache, alopecia, hypokalemia, hypocalcemia, nausea, constipation, abdominal pain, neutropenia, anemia, increased ALT, increased AST, etc.       Above discussed at length with the patient.  All questions answered.    Discussed labs and scans and gave her copies of relevant records.    Plan of management discussed once again.  Have already explained and reinforced that she has stage IV disease which is incurable but we can attempt palliation with systemic chemotherapy; response can not be guaranteed and long-term prognosis is guarded.    She understands and agrees with this plan.

## 2023-06-01 NOTE — PLAN OF CARE
C4D8 Halaven held d/t ANC 0.411. Neupogen given. Pt will return for Neupogen injection tomorrow. AVS given to pt. Dc'd home in stable condition.

## 2023-06-02 ENCOUNTER — INFUSION (OUTPATIENT)
Dept: INFUSION THERAPY | Facility: HOSPITAL | Age: 68
End: 2023-06-02
Attending: INTERNAL MEDICINE

## 2023-06-02 VITALS
OXYGEN SATURATION: 96 % | WEIGHT: 106 LBS | TEMPERATURE: 98 F | SYSTOLIC BLOOD PRESSURE: 112 MMHG | HEART RATE: 99 BPM | DIASTOLIC BLOOD PRESSURE: 55 MMHG | BODY MASS INDEX: 20.01 KG/M2 | HEIGHT: 61 IN | RESPIRATION RATE: 16 BRPM

## 2023-06-02 DIAGNOSIS — G62.0 CHEMOTHERAPY-INDUCED NEUROPATHY: ICD-10-CM

## 2023-06-02 DIAGNOSIS — T45.1X5A CHEMOTHERAPY-INDUCED NEUROPATHY: ICD-10-CM

## 2023-06-02 DIAGNOSIS — D70.1 CHEMOTHERAPY INDUCED NEUTROPENIA: Primary | ICD-10-CM

## 2023-06-02 DIAGNOSIS — T45.1X5A CHEMOTHERAPY INDUCED NEUTROPENIA: Primary | ICD-10-CM

## 2023-06-02 PROCEDURE — 63600175 PHARM REV CODE 636 W HCPCS: Performed by: NURSE PRACTITIONER

## 2023-06-02 PROCEDURE — 96372 THER/PROPH/DIAG INJ SC/IM: CPT

## 2023-06-02 RX ADMIN — FILGRASTIM-SNDZ 300 MCG: 300 INJECTION, SOLUTION INTRAVENOUS; SUBCUTANEOUS at 09:06

## 2023-06-05 ENCOUNTER — INFUSION (OUTPATIENT)
Dept: INFUSION THERAPY | Facility: HOSPITAL | Age: 68
End: 2023-06-05
Attending: INTERNAL MEDICINE

## 2023-06-05 VITALS
HEART RATE: 95 BPM | TEMPERATURE: 98 F | BODY MASS INDEX: 20.01 KG/M2 | HEIGHT: 61 IN | DIASTOLIC BLOOD PRESSURE: 61 MMHG | SYSTOLIC BLOOD PRESSURE: 105 MMHG | WEIGHT: 106 LBS | RESPIRATION RATE: 20 BRPM | OXYGEN SATURATION: 98 %

## 2023-06-05 DIAGNOSIS — C50.011 MALIGNANT NEOPLASM OF NIPPLE OF RIGHT BREAST IN FEMALE, UNSPECIFIED ESTROGEN RECEPTOR STATUS: ICD-10-CM

## 2023-06-05 DIAGNOSIS — R59.0 AXILLARY LYMPHADENOPATHY: ICD-10-CM

## 2023-06-05 DIAGNOSIS — C78.7 MALIGNANT NEOPLASM METASTATIC TO LIVER: Primary | ICD-10-CM

## 2023-06-05 DIAGNOSIS — E87.6 HYPOKALEMIA: Primary | ICD-10-CM

## 2023-06-05 PROCEDURE — 25000003 PHARM REV CODE 250: Performed by: INTERNAL MEDICINE

## 2023-06-05 PROCEDURE — 96375 TX/PRO/DX INJ NEW DRUG ADDON: CPT

## 2023-06-05 PROCEDURE — 96409 CHEMO IV PUSH SNGL DRUG: CPT

## 2023-06-05 PROCEDURE — 63600175 PHARM REV CODE 636 W HCPCS: Mod: JZ,JG | Performed by: INTERNAL MEDICINE

## 2023-06-05 RX ORDER — SODIUM CHLORIDE 0.9 % (FLUSH) 0.9 %
10 SYRINGE (ML) INJECTION
Status: DISCONTINUED | OUTPATIENT
Start: 2023-06-05 | End: 2023-06-05 | Stop reason: HOSPADM

## 2023-06-05 RX ORDER — ONDANSETRON 2 MG/ML
8 INJECTION INTRAMUSCULAR; INTRAVENOUS
Status: COMPLETED | OUTPATIENT
Start: 2023-06-05 | End: 2023-06-05

## 2023-06-05 RX ORDER — HEPARIN 100 UNIT/ML
500 SYRINGE INTRAVENOUS
Status: DISCONTINUED | OUTPATIENT
Start: 2023-06-05 | End: 2023-06-05 | Stop reason: HOSPADM

## 2023-06-05 RX ORDER — POTASSIUM CHLORIDE 20 MEQ/1
20 TABLET, EXTENDED RELEASE ORAL DAILY
Qty: 14 TABLET | Refills: 0 | Status: SHIPPED | OUTPATIENT
Start: 2023-06-05 | End: 2023-06-19

## 2023-06-05 RX ADMIN — ERIBULIN MESYLATE 1.7 MG: 0.5 INJECTION INTRAVENOUS at 12:06

## 2023-06-05 RX ADMIN — ONDANSETRON 8 MG: 2 INJECTION INTRAMUSCULAR; INTRAVENOUS at 12:06

## 2023-06-05 NOTE — PLAN OF CARE
C4D8 Halaven given: ANC 2640  Okay to treat obtained from Yessy Marsh NP    New Rx for Potassium also sent to [facility] for K 3.1

## 2023-06-05 NOTE — PROGRESS NOTES
Hypokalemia K+ level 3.1  Start KCL 20meq po daily for 2 weeks-rx sent to [facility] pharmacy

## 2023-06-16 ENCOUNTER — TELEPHONE (OUTPATIENT)
Dept: HEMATOLOGY/ONCOLOGY | Facility: CLINIC | Age: 68
End: 2023-06-16
Payer: MEDICAID

## 2023-06-16 ENCOUNTER — DOCUMENTATION ONLY (OUTPATIENT)
Dept: INFUSION THERAPY | Facility: HOSPITAL | Age: 68
End: 2023-06-16
Payer: MEDICAID

## 2023-06-16 NOTE — NURSING
"AKHIL Simmons received request to speak to patient regarding Medicaid coverage through [employer] being ineligible. Spoke with patient per phone. Patient states that "they are working on it" in reference to her Medicaid eligibility. Patient indicated confusion regarding the difference between Medicaid and Medicare AEB saying she calls the number on the back of her [employer] card to apply for Medicare. Educated patient on Medicare is federal health insurance one would apply for at age 65 and older which has parts A & B etc., and Medicaid is a joint federal and state program that gives health coverage for someone with limited income and resources. Patient states she has not paid into the Medicare system for 10 years but her  has. Provided patient with Medicare number ([phone]) to initiate application. Informed patient that she will need to speak with [facility] Financial assistance for 1) Medicaid coverage/eligibility and 2) for financial assistance until she has Medicaid &/or Medicare. Through patient teach back patient agreed she would speak with Financial Assistance Office and call Medicare. Encouraged patient to contact AKHIL Simmons id has any further questions.    "

## 2023-06-16 NOTE — NURSING
I have been alerted by Pre-Service department that patient's insurance status is in question.  We have reached out the patient to try to help her sort the issues.      Yessy, Can we delay Cycle 5 of [place] X1 week so the patient will have time to get her Medicare/Medicaid status corrected?    Thanks  Haritha Benítez RN

## 2023-06-22 ENCOUNTER — TELEPHONE (OUTPATIENT)
Dept: HEMATOLOGY/ONCOLOGY | Facility: CLINIC | Age: 68
End: 2023-06-22
Payer: MEDICAID

## 2023-06-22 NOTE — NURSING
"Follow up phone contact with patient regarding insurance benefits and financial assistance. Patient states she has not talked to anyone with [facility] Financial Assistance. Says she heard from someone with Medicare but that she is waiting on her card from "Medicaid". Informed patient of appointments and scans scheduled next week. Explained the importance of having financial assistance so that she does not get billed for the services which can be very expensive. Patient says she will talk to financial assistance on Monday.   "

## 2023-06-30 DIAGNOSIS — C50.911 MALIGNANT NEOPLASM OF RIGHT FEMALE BREAST, UNSPECIFIED ESTROGEN RECEPTOR STATUS, UNSPECIFIED SITE OF BREAST: Primary | ICD-10-CM

## 2023-07-06 ENCOUNTER — TELEPHONE (OUTPATIENT)
Dept: HEMATOLOGY/ONCOLOGY | Facility: CLINIC | Age: 68
End: 2023-07-06

## 2023-07-11 ENCOUNTER — TELEPHONE (OUTPATIENT)
Dept: HEMATOLOGY/ONCOLOGY | Facility: CLINIC | Age: 68
End: 2023-07-11
Payer: MEDICAID

## 2023-07-11 NOTE — NURSING
Received phone call from patient's spouse, Kaveh, regarding patient declining. Reports that patient is very weak and has had weight loss. Patient and spouse requesting referral to hospice. Says that once they speak with hospice, patient will decide if she will discontinue all oncology appointments. Presently patient's next appt is on 7/17/22. AKHIL Simmons will follow up with patient at the end of the week.

## 2023-07-14 ENCOUNTER — TELEPHONE (OUTPATIENT)
Dept: HEMATOLOGY/ONCOLOGY | Facility: CLINIC | Age: 68
End: 2023-07-14
Payer: MEDICAID

## 2023-07-14 NOTE — NURSING
Follow up phone contact. AKHIL Simmons spoke with patient's  who confirms patient is admitted to hospice services and requested to cancel oncology appointments. AKHIL Simmons provided emotional support to patient/family.

## 2023-08-16 LAB
INR PPP: 1.1
PROTHROMBIN TIME: 14.2 SECONDS (ref 11.4–14)